# Patient Record
Sex: FEMALE | Race: WHITE | NOT HISPANIC OR LATINO | Employment: OTHER | URBAN - METROPOLITAN AREA
[De-identification: names, ages, dates, MRNs, and addresses within clinical notes are randomized per-mention and may not be internally consistent; named-entity substitution may affect disease eponyms.]

---

## 2017-05-18 ENCOUNTER — TELEPHONE (OUTPATIENT)
Dept: PEDIATRICS | Facility: CLINIC | Age: 72
End: 2017-05-18

## 2017-05-18 NOTE — LETTER
[facility]  [address] [place] [address] [phone]      May 18, 2017    Margaret Ramos                                                                                                                                                       [id number]  [employer] [id number]              Dear Margaret,      Your provider has reviewed your chart and it looks like you are due for a mammogram. If you could schedule this to your earliest convenience. This is available at different [place] locations and in our [facility] as well.If you have any questions feel free to call the clinic at [phone] . Thank you.                    Sincerely,  Enoc Price MD

## 2017-05-18 NOTE — TELEPHONE ENCOUNTER
Type of outreach:  Phone, left message for patient to call back.  and Sent letter.  Health Maintenance Due   Topic Date Due     TETANUS IMMUNIZATION (SYSTEM ASSIGNED)  03/01/1963     ADVANCE DIRECTIVE PLANNING Q5 YRS (NO INBASKET)  03/01/1963     PNEUMOCOCCAL (1 of 2 - PCV13) 03/01/2010     EYE EXAM Q1 YEAR( NO INBASKET)  07/23/2016     A1C Q6 MO( NO INBASKET)  05/29/2017     FALL RISK ASSESSMENT  06/07/2017     Elvia Willett MA

## 2017-05-18 NOTE — TELEPHONE ENCOUNTER
Please call patient to schedule a mammogram. Does not need visit with me for that, just schedule with mammogram.    Looks as though due to see Dr. Luciano for DM2 as well.     Enoc Price MD

## 2017-07-27 ENCOUNTER — TELEPHONE (OUTPATIENT)
Dept: PEDIATRICS | Facility: CLINIC | Age: 72
End: 2017-07-27

## 2017-07-27 DIAGNOSIS — R03.0 ELEVATED BLOOD PRESSURE READING WITHOUT DIAGNOSIS OF HYPERTENSION: ICD-10-CM

## 2017-07-27 DIAGNOSIS — E03.8 OTHER SPECIFIED HYPOTHYROIDISM: ICD-10-CM

## 2017-07-27 DIAGNOSIS — E78.2 MIXED HYPERLIPIDEMIA: ICD-10-CM

## 2017-07-27 NOTE — TELEPHONE ENCOUNTER
blood glucose monitoring (ACCU-CHEK JINA PLUS) test strip      Last Written Prescription Date: 5/19/2016  Last Fill Quantity: 400,  # refills: 3   Last Office Visit with [facility], [facility] or [facility] prescribing provider: 11/29/2016

## 2017-07-28 NOTE — TELEPHONE ENCOUNTER
Medication is being filled for 1 time refill only due to:  Patient needs to be seen because due for office visit/lab..     Routing to [facility] to facilitate office visit appointment.    Rachael David MSN, RN-BC  Care Coordinator

## 2017-07-28 NOTE — TELEPHONE ENCOUNTER
Spoke to pt. States she will callback in August. Informed her Dr. Luciano is booked out about a month. States she may want to see Dr. Price instead is who she has been seeing. Will be bringing outside orders from [facility] to have Lipids drawn from her Cardiologist that she see's.  Will reach out in 1 month if patient has not scheduled.    Thanks  Joey BLOOD  Team Coodinator

## 2017-08-18 ENCOUNTER — DOCUMENTATION ONLY (OUTPATIENT)
Dept: PEDIATRICS | Facility: CLINIC | Age: 72
End: 2017-08-18

## 2017-08-18 DIAGNOSIS — E21.3 HYPERPARATHYROIDISM (H): ICD-10-CM

## 2017-08-18 DIAGNOSIS — E78.2 MIXED HYPERLIPIDEMIA: ICD-10-CM

## 2017-08-18 DIAGNOSIS — E11.65 TYPE 2 DIABETES MELLITUS WITH HYPERGLYCEMIA, WITHOUT LONG-TERM CURRENT USE OF INSULIN (H): Primary | ICD-10-CM

## 2017-08-18 DIAGNOSIS — E03.8 OTHER SPECIFIED HYPOTHYROIDISM: ICD-10-CM

## 2017-08-18 NOTE — PROGRESS NOTES
has an upcoming lab only appointment 8/22. There are no open orders for this patient. Please place future orders and sign them.     Thanks

## 2017-08-22 DIAGNOSIS — E21.3 HYPERPARATHYROIDISM (H): ICD-10-CM

## 2017-08-22 DIAGNOSIS — E11.65 TYPE 2 DIABETES MELLITUS WITH HYPERGLYCEMIA, WITHOUT LONG-TERM CURRENT USE OF INSULIN (H): ICD-10-CM

## 2017-08-22 DIAGNOSIS — E78.2 MIXED HYPERLIPIDEMIA: ICD-10-CM

## 2017-08-22 LAB
ALBUMIN SERPL-MCNC: 4.2 G/DL (ref 3.4–5)
ALP SERPL-CCNC: 132 U/L (ref 40–150)
ALT SERPL W P-5'-P-CCNC: 40 U/L (ref 0–50)
ANION GAP SERPL CALCULATED.3IONS-SCNC: 5 MMOL/L (ref 3–14)
AST SERPL W P-5'-P-CCNC: 24 U/L (ref 0–45)
BILIRUB SERPL-MCNC: 0.5 MG/DL (ref 0.2–1.3)
BUN SERPL-MCNC: 14 MG/DL (ref 7–30)
CALCIUM SERPL-MCNC: 10.1 MG/DL (ref 8.5–10.1)
CHLORIDE SERPL-SCNC: 106 MMOL/L (ref 94–109)
CHOLEST SERPL-MCNC: 174 MG/DL
CO2 SERPL-SCNC: 28 MMOL/L (ref 20–32)
CREAT SERPL-MCNC: 0.73 MG/DL (ref 0.52–1.04)
GFR SERPL CREATININE-BSD FRML MDRD: 79 ML/MIN/1.7M2
GLUCOSE SERPL-MCNC: 186 MG/DL (ref 70–99)
HBA1C MFR BLD: 7.9 % (ref 4.3–6)
HDLC SERPL-MCNC: 64 MG/DL
LDLC SERPL CALC-MCNC: 88 MG/DL
NONHDLC SERPL-MCNC: 110 MG/DL
POTASSIUM SERPL-SCNC: 4.8 MMOL/L (ref 3.4–5.3)
PROT SERPL-MCNC: 7.3 G/DL (ref 6.8–8.8)
PTH-INTACT SERPL-MCNC: 100 PG/ML (ref 12–72)
SODIUM SERPL-SCNC: 139 MMOL/L (ref 133–144)
TRIGL SERPL-MCNC: 109 MG/DL

## 2017-08-22 PROCEDURE — 83036 HEMOGLOBIN GLYCOSYLATED A1C: CPT | Performed by: PEDIATRICS

## 2017-08-22 PROCEDURE — 80061 LIPID PANEL: CPT | Performed by: PEDIATRICS

## 2017-08-22 PROCEDURE — 83970 ASSAY OF PARATHORMONE: CPT | Performed by: PEDIATRICS

## 2017-08-22 PROCEDURE — 80053 COMPREHEN METABOLIC PANEL: CPT | Performed by: PEDIATRICS

## 2017-08-22 PROCEDURE — 36415 COLL VENOUS BLD VENIPUNCTURE: CPT | Performed by: PEDIATRICS

## 2017-09-03 DIAGNOSIS — E03.8 OTHER SPECIFIED HYPOTHYROIDISM: ICD-10-CM

## 2017-09-03 DIAGNOSIS — R03.0 ELEVATED BLOOD PRESSURE READING WITHOUT DIAGNOSIS OF HYPERTENSION: ICD-10-CM

## 2017-09-03 DIAGNOSIS — E78.2 MIXED HYPERLIPIDEMIA: ICD-10-CM

## 2017-09-04 NOTE — TELEPHONE ENCOUNTER
blood glucose monitoring (ACCU-CHEK JINA PLUS) test strip      Last Written Prescription Date: 8/14/2017  Last Fill Quantity: 100,  # refills: 0   Last Office Visit with [facility], [facility] or [facility] prescribing provider: 11/29/2016                                         Next 5 appointments (look out 90 days)     Sep 07, 2017  2:20 PM CDT   Office Visit with Jossie [facility] MD   [facility]    [address] [place] [address]  [address] [place] [id number] [phone]

## 2017-09-06 RX ORDER — BLOOD SUGAR DIAGNOSTIC
STRIP MISCELLANEOUS
Qty: 100 STRIP | Refills: 0 | Status: SHIPPED | OUTPATIENT
Start: 2017-09-06 | End: 2017-09-07

## 2017-09-06 NOTE — TELEPHONE ENCOUNTER
Medication is being filled for 1 time refill only due to:  Appt required-has appt scheduled for tmr   Jayne Abernathy RN

## 2017-09-07 ENCOUNTER — OFFICE VISIT (OUTPATIENT)
Dept: PEDIATRICS | Facility: CLINIC | Age: 72
End: 2017-09-07
Payer: COMMERCIAL

## 2017-09-07 VITALS
SYSTOLIC BLOOD PRESSURE: 122 MMHG | HEIGHT: 63 IN | OXYGEN SATURATION: 98 % | WEIGHT: 198 LBS | BODY MASS INDEX: 35.08 KG/M2 | HEART RATE: 77 BPM | TEMPERATURE: 98.2 F | DIASTOLIC BLOOD PRESSURE: 70 MMHG

## 2017-09-07 DIAGNOSIS — E03.8 OTHER SPECIFIED HYPOTHYROIDISM: ICD-10-CM

## 2017-09-07 DIAGNOSIS — E78.2 MIXED HYPERLIPIDEMIA: ICD-10-CM

## 2017-09-07 DIAGNOSIS — Z95.5 S/P DRUG ELUTING CORONARY STENT PLACEMENT: ICD-10-CM

## 2017-09-07 DIAGNOSIS — E11.9 TYPE 2 DIABETES MELLITUS WITHOUT COMPLICATION, WITHOUT LONG-TERM CURRENT USE OF INSULIN (H): Primary | ICD-10-CM

## 2017-09-07 DIAGNOSIS — R29.890 LOSS OF HEIGHT: ICD-10-CM

## 2017-09-07 DIAGNOSIS — I25.10 ASCVD (ARTERIOSCLEROTIC CARDIOVASCULAR DISEASE): ICD-10-CM

## 2017-09-07 DIAGNOSIS — E21.3 HYPERPARATHYROIDISM (H): ICD-10-CM

## 2017-09-07 DIAGNOSIS — Z91.89 AT RISK FOR OSTEOPENIA: ICD-10-CM

## 2017-09-07 DIAGNOSIS — E66.01 MORBID OBESITY, UNSPECIFIED OBESITY TYPE (H): ICD-10-CM

## 2017-09-07 PROCEDURE — 99215 OFFICE O/P EST HI 40 MIN: CPT | Performed by: PEDIATRICS

## 2017-09-07 RX ORDER — LANCETS
EACH MISCELLANEOUS
Qty: 400 EACH | Refills: 11 | Status: SHIPPED | OUTPATIENT
Start: 2017-09-07 | End: 2019-04-30

## 2017-09-07 RX ORDER — GLYBURIDE 2.5 MG/1
2.5 TABLET ORAL 2 TIMES DAILY WITH MEALS
Qty: 270 TABLET | Refills: 3 | Status: SHIPPED | OUTPATIENT
Start: 2017-09-07 | End: 2018-09-12

## 2017-09-07 RX ORDER — LEVOTHYROXINE SODIUM 112 UG/1
112 TABLET ORAL DAILY
Qty: 90 TABLET | Refills: 3 | Status: SHIPPED | OUTPATIENT
Start: 2017-09-07 | End: 2018-09-12

## 2017-09-07 NOTE — PROGRESS NOTES
SUBJECTIVE:   Margaret Ramos is a 72 year old female who presents to clinic today for the following health issues:      Diabetes Follow-up    Patient is checking blood sugars: three times daily.   Blood sugar testing frequency justification: Patient modifying lifestyle changes (diet, exercise) with blood sugars  Results are as follows:       am - 150 to 180       bedtime - 200        Diabetic concerns: None     Symptoms of hypoglycemia (low blood sugar): lethargy, Symptoms occur if sugars < 120.     Paresthesias (numbness or burning in feet) or sores: Yes     Date of last diabetic eye exam: 07/2015    Hyperlipidemia Follow-Up      Rate your low fat/cholesterol diet?: good    Taking statin?  Yes, no muscle aches from statin    Other lipid medications/supplements?:  none    Hypertension Follow-up      Outpatient blood pressures are being checked at home.  Results are 120-100/70-50.    Low Salt Diet: not monitoring salt      October 9th - has cardiology follow up for CAD - had MI last year.  No new cardiac symptoms or anginal equivalents.   Not currently exercising regularly - long discussion about increased activity today.  S/p drug eluting stent placement.  ON plavix.    Has heard of silver sneakers program - researched in the past, but hasn't pursued again.  Has a treadmill at home, but it is too cumbersome and requires too much space to use on a regular basis.      Hypothyroidism - no new symptoms related to low or high thyroid.    Has had high normal calcium levels for last lab draws and high parathyroid values - hasn't yet followed up with endocrinology.  Highly recommended that she do so.  Lost a partial inch of height at this visit, which is very troubling to her.    Willing to pursue a dexa scan given height loss.  Prefers not to do a mammogram in the same year as she feels that this is too much radiation.      Problem list and histories reviewed & adjusted, as indicated.  Additional history: as  "documented      Reviewed and updated as needed this visit by clinical staffTobacco  Allergies  Med Hx  Surg Hx  Fam Hx  Soc Hx      Reviewed and updated as needed this visit by Provider         ROS:  Constitutional, HEENT, cardiovascular, pulmonary, endo and neuro systems are negative, except as otherwise noted.      OBJECTIVE:   /70 (BP Location: Right arm, Patient Position: Right side, Cuff Size: Adult Regular)  Pulse 77  Temp 98.2  F (36.8  C) (Tympanic)  Ht 5' 2.75\" (1.594 m)  Wt 198 lb (89.8 kg)  SpO2 98%  BMI 35.35 kg/m2  Body mass index is 35.35 kg/(m^2).  GENERAL: alert, no distress and obese  NECK: no adenopathy, no asymmetry, masses, or scars and thyroid normal to palpation  RESP: lungs clear to auscultation - no rales, rhonchi or wheezes  CV: regular rate and rhythm, normal S1 S2, no S3 or S4, no murmur, click or rub, no peripheral edema and peripheral pulses strong  NEURO: Normal strength and tone, mentation intact and speech normal  PSYCH: mentation appears normal, affect normal/bright  Diabetic foot exam: normal DP and PT pulses, no trophic changes or ulcerative lesions and reduced sensation to arch of foot bilaterally    Diagnostic Test Results:  Results for orders placed or performed in visit on 08/22/17   Hemoglobin A1c   Result Value Ref Range    Hemoglobin A1C 7.9 (H) 4.3 - 6.0 %   Parathyroid Hormone Intact   Result Value Ref Range    Parathyroid Hormone Intact 100 (H) 12 - 72 pg/mL   Comprehensive metabolic panel   Result Value Ref Range    Sodium 139 133 - 144 mmol/L    Potassium 4.8 3.4 - 5.3 mmol/L    Chloride 106 94 - 109 mmol/L    Carbon Dioxide 28 20 - 32 mmol/L    Anion Gap 5 3 - 14 mmol/L    Glucose 186 (H) 70 - 99 mg/dL    Urea Nitrogen 14 7 - 30 mg/dL    Creatinine 0.73 0.52 - 1.04 mg/dL    GFR Estimate 79 >60 mL/min/1.7m2    GFR Estimate If Black >90 >60 mL/min/1.7m2    Calcium 10.1 8.5 - 10.1 mg/dL    Bilirubin Total 0.5 0.2 - 1.3 mg/dL    Albumin 4.2 3.4 - 5.0 g/dL "    Protein Total 7.3 6.8 - 8.8 g/dL    Alkaline Phosphatase 132 40 - 150 U/L    ALT 40 0 - 50 U/L    AST 24 0 - 45 U/L   Lipid panel reflex to direct LDL   Result Value Ref Range    Cholesterol 174 <200 mg/dL    Triglycerides 109 <150 mg/dL    HDL Cholesterol 64 >49 mg/dL    LDL Cholesterol Calculated 88 <100 mg/dL    Non HDL Cholesterol 110 <130 mg/dL       ASSESSMENT/PLAN:       ICD-10-CM    1. Type 2 diabetes mellitus without complication, without long-term current use of insulin (H) E11.9 metFORMIN (GLUCOPHAGE) 500 MG tablet     glyBURIDE (DIABETA /MICRONASE) 2.5 MG tablet     blood glucose monitoring (ACCU-CHEK JINA PLUS) test strip     blood glucose monitoring (SOFTCLIX) lancets     Vitamin B12     TSH     T4, free     T3 Free     Vitamin D Deficiency          Stable from last check, just barely maintaining control.  Long discussion today about activity level, considering exercise part of her diabetes treatment plan.  Follow up in 6 months.  Continue current medications for now.                  2. Other specified hypothyroidism E03.8 blood glucose monitoring (ACCU-CHEK JINA PLUS) test strip     levothyroxine (SYNTHROID/LEVOTHROID) 112 MCG tablet     blood glucose monitoring (SOFTCLIX) lancets     TSH     T4, free     T3 Free                  3. Mixed hyperlipidemia (DYSLIPIDEMIA) E78.2 blood glucose monitoring (ACCU-CHEK JINA PLUS) test strip     levothyroxine (SYNTHROID/LEVOTHROID) 112 MCG tablet     blood glucose monitoring (SOFTCLIX) lancets   4. Hyperparathyroidism (H) E21.3 DX Hip/Pelvis/Spine     ENDOCRINOLOGY ADULT REFERRAL     TSH     T4, free     T3 Free     Vitamin D Deficiency          Agreeable now to following up with endocrinology - new referral placed - patient to call for visit             5. At risk for osteopenia Z91.89 DX Hip/Pelvis/Spine  Prolonged hyperparathyroidism   6. Morbid obesity, unspecified obesity type (H) E66.01 Discussed recommended lifestyle changes.   7. Loss of height  R29.890 Due for dexa - see above   8. ASCVD (arteriosclerotic cardiovascular disease) I25.10 Follows with cardiology - upcoming appt - no new symptoms   9. S/P drug eluting coronary stent placement Z95.5 Remains on antiplatelet therapy       Patient Instructions   Call for a visit with Cha Santos    [facility] about [facility] for walking     Labs today on the first floor    Keep medications the same    If you can have your cardiologist send me a copy of your note, that would be wonderful      40 minutes spent with patient, more than 50% of the time was spent in counseling and coordination of care of the above issues.      Jossie Luciano MD  [facility]

## 2017-09-07 NOTE — PATIENT INSTRUCTIONS
Call for a visit with Cha Santos    Think about [facility] for walking     Labs today on the first floor    Keep medications the same    If you can have your cardiologist send me a copy of your note, that would be wonderful

## 2017-09-07 NOTE — MR AVS SNAPSHOT
After Visit Summary   9/7/2017    Margaret Ramos    MRN: [id number]           Patient Information     Date Of Birth          1945        Visit Information        Provider Department      9/7/2017 2:20 PM Jossie Luciano MD [facility]        Today's Diagnoses     Hyperparathyroidism (H)    -  1    Type 2 diabetes mellitus without complication, without long-term current use of insulin (H)        Other specified hypothyroidism        Mixed hyperlipidemia (DYSLIPIDEMIA)        At risk for osteopenia          Care Instructions    Call for a visit with Cha Santos    Think about [facility] for walking     Labs today on the first floor    Keep medications the same    If you can have your cardiologist send me a copy of your note, that would be wonderful          Follow-ups after your visit        Additional Services     ENDOCRINOLOGY ADULT REFERRAL       Your provider has referred you to: [facility]: [facility] [phone]   [email or web address]      Please be aware that coverage of these services is subject to the terms and limitations of your health insurance plan.  Call member services at your health plan with any benefit or coverage questions.      Please bring the following to your appointment:    >>   Any x-rays, CTs or MRIs which have been performed.  Contact the facility where they were done to arrange for  prior to your scheduled appointment.    >>   List of current medications   >>   This referral request   >>   Any documents/labs given to you for this referral                  Future tests that were ordered for you today     Open Future Orders        Priority Expected Expires Ordered    DX Hip/Pelvis/Spine Routine  9/7/2018 9/7/2017            Who to contact     If you have questions or need follow up information about today's clinic visit or your schedule please contact [facility] directly at  "[phone].  Normal or non-critical lab and imaging results will be communicated to you by MyChart, letter or phone within 4 business days after the clinic has received the results. If you do not hear from us within 7 days, please contact the clinic through [email or web address] or phone. If you have a critical or abnormal lab result, we will notify you by phone as soon as possible.  Submit refill requests through [employer] or call your pharmacy and they will forward the refill request to us. Please allow 3 business days for your refill to be completed.          Additional Information About Your Visit        [email or web address] Information     [employer] lets you send messages to your doctor, view your test results, renew your prescriptions, schedule appointments and more. To sign up, go to [email or web address] . Click on \"Log in\" on the left side of the screen, which will take you to the Welcome page. Then click on \"Sign up Now\" on the right side of the page.     You will be asked to enter the access code listed below, as well as some personal information. Please follow the directions to create your username and password.     Your access code is: [email or web address]  Expires: 2017  3:12 PM     Your access code will  in 90 days. If you need help or a new code, please call your [place] clinic or [phone].        Care EveryWhere ID     This is your Care EveryWhere ID. This could be used by other organizations to access your [place] medical records  [id number]        Your Vitals Were     Pulse Temperature Height Pulse Oximetry BMI (Body Mass Index)       77 98.2  F (36.8  C) (Tympanic) 5' 2.75\" (1.594 m) 98% 35.35 kg/m2        Blood Pressure from Last 3 Encounters:   17 122/70   16 128/70   16 130/68    Weight from Last 3 Encounters:   17 198 lb (89.8 kg)   16 197 lb (89.4 kg)   16 198 lb (89.8 kg)              We Performed the Following     ENDOCRINOLOGY ADULT REFERRAL     T3 Free     T4, free  "    TSH     Vitamin B12     Vitamin D Deficiency          Today's Medication Changes          These changes are accurate as of: 9/7/17  3:12 PM.  If you have any questions, ask your nurse or doctor.               These medicines have changed or have updated prescriptions.        Dose/Directions    blood glucose monitoring lancets   This may have changed:  additional instructions   Used for:  Other specified hypothyroidism, Type 2 diabetes mellitus without complication, without long-term current use of insulin (H), Mixed hyperlipidemia   Changed by:  Jossie Luciano MD        Use as directed upto 6 times daily   Quantity:  400 each   Refills:  11       blood glucose monitoring test strip   Commonly known as:  ACCU-CHEK JINA PLUS   This may have changed:    - additional instructions  - Another medication with the same name was removed. Continue taking this medication, and follow the directions you see here.   Used for:  Other specified hypothyroidism, Mixed hyperlipidemia, Type 2 diabetes mellitus without complication, without long-term current use of insulin (H)   Changed by:  Jossie Luciano MD        Use as directed up to 6 times daily   Quantity:  400 each   Refills:  3            Where to get your medicines      Some of these will need a paper prescription and others can be bought over the counter.  Ask your nurse if you have questions.     Bring a paper prescription for each of these medications     blood glucose monitoring lancets    blood glucose monitoring test strip    glyBURIDE 2.5 MG tablet    levothyroxine 112 MCG tablet    metFORMIN 500 MG tablet                Primary Care Provider Office Phone # Fax #    Jossie Luciano [phone] [address] [place] [address]        Equal Access to Services     [facility] [place] [facility]: Brett Salguero, rosa hussein, mookie dave. So [facility]  [phone].    ATENCIÓN: Si tae giraldo, tiene a delgado disposición servicios gratuitos de asistencia lingüística. Radha ashraf [phone].    We comply with applicable federal civil rights laws and [place] laws. We do not discriminate on the basis of race, color, national origin, age, disability sex, sexual orientation or gender identity.            Thank you!     Thank you for choosing [facility]  for your care. Our goal is always to provide you with excellent care. Hearing back from our patients is one way we can continue to improve our services. Please take a few minutes to complete the written survey that you may receive in the mail after your visit with us. Thank you!             Your Updated Medication List - Protect others around you: Learn how to safely use, store and throw away your medicines at www.disposemymeds.org.          This list is accurate as of: 9/7/17  3:12 PM.  Always use your most recent med list.                   Brand Name Dispense Instructions for use Diagnosis    ACE NOT PRESCRIBED (INTENTIONAL)      ACE Inhibitor not prescribed due to Refusal by patient        ACE/ARB NOT PRESCRIBED (INTENTIONAL)      ACE & ARB not prescribed due to Symptomatic hypotension not due to excessive diuresis    Type 2 diabetes mellitus without complication, without long-term current use of insulin (H)       albuterol (2.5 MG/3ML) 0.083% neb solution     360 mL    Take 1 vial (2.5 mg) by nebulization every 6 hours as needed for shortness of breath / dyspnea or wheezing    Mild asthma       ascorbic acid 500 MG tablet    VITAMIN C     Take 1 tablet by mouth        aspirin 81 MG EC tablet     90 tablet    Take 1 tablet (81 mg) by mouth daily    Diabetes mellitus, type 2 (H)       atorvastatin 20 MG tablet    LIPITOR    90 tablet    Take 4 tablets (80 mg) by mouth daily    Routine general medical examination at a health care facility, Type 2 diabetes mellitus without complication, without long-term  current use of insulin (H)       blood glucose monitoring lancets     400 each    Use as directed upto 6 times daily    Other specified hypothyroidism, Type 2 diabetes mellitus without complication, without long-term current use of insulin (H), Mixed hyperlipidemia       blood glucose monitoring test strip    ACCU-CHEK JINA PLUS    400 each    Use as directed up to 6 times daily    Other specified hypothyroidism, Mixed hyperlipidemia, Type 2 diabetes mellitus without complication, without long-term current use of insulin (H)       CALCIUM PO           CENTRUM SILVER per tablet      daily (every 24 hours).        CoQ-10 200 MG Caps      Take 200 mg by mouth daily        glyBURIDE 2.5 MG tablet    DIABETA /MICRONASE    270 tablet    Take 1 tablet (2.5 mg) by mouth 2 times daily (with meals)    Type 2 diabetes mellitus without complication, without long-term current use of insulin (H)       ketoconazole 2 % cream    NIZORAL    60 g    Apply topically 2 times daily    Type 2 diabetes mellitus with hyperglycemia (H)       levothyroxine 112 MCG tablet    SYNTHROID/LEVOTHROID    90 tablet    Take 1 tablet (112 mcg) by mouth daily Take 1/2 tab on Sundays    Other specified hypothyroidism, Mixed hyperlipidemia       magnesium sulfate 500 mg/mL Soln           metFORMIN 500 MG tablet    GLUCOPHAGE    360 tablet    Take 1 tablet (500 mg) by mouth 3 times daily (with meals) Take 1 and 1/2 tab qam, 1/2 tab q lunch and 1/2 tab q pm    Type 2 diabetes mellitus without complication, without long-term current use of insulin (H)       metoprolol 25 MG 24 hr tablet    TOPROL-XL     Take 0.5 tablets (12.5 mg) by mouth daily        PLAVIX 75 MG tablet   Generic drug:  clopidogrel      Take 1 tablet (75 mg) by mouth daily    Chronic ischemic heart disease       UNABLE TO FIND      MEDICATION NAME: Super Beet        VITAMIN D3 PO      Take 2,000 Units by mouth daily

## 2017-09-07 NOTE — NURSING NOTE
"Chief Complaint   Patient presents with     Diabetes     Recheck Medication       Initial /70 (BP Location: Right arm, Patient Position: Right side, Cuff Size: Adult Regular)  Pulse 77  Temp 98.2  F (36.8  C) (Tympanic)  Ht 5' 2.75\" (1.594 m)  Wt 198 lb (89.8 kg)  SpO2 98%  BMI 35.35 kg/m2 Estimated body mass index is 35.35 kg/(m^2) as calculated from the following:    Height as of this encounter: 5' 2.75\" (1.594 m).    Weight as of this encounter: 198 lb (89.8 kg).  Medication Reconciliation: complete  "

## 2017-09-12 DIAGNOSIS — E03.8 OTHER SPECIFIED HYPOTHYROIDISM: ICD-10-CM

## 2017-09-12 DIAGNOSIS — E21.3 HYPERPARATHYROIDISM (H): ICD-10-CM

## 2017-09-12 DIAGNOSIS — E11.9 TYPE 2 DIABETES MELLITUS WITHOUT COMPLICATION, WITHOUT LONG-TERM CURRENT USE OF INSULIN (H): ICD-10-CM

## 2017-09-12 LAB
T3FREE SERPL-MCNC: 2 PG/ML (ref 2.3–4.2)
VIT B12 SERPL-MCNC: 972 PG/ML (ref 193–986)

## 2017-09-12 PROCEDURE — 84481 FREE ASSAY (FT-3): CPT | Performed by: PEDIATRICS

## 2017-09-12 PROCEDURE — 36415 COLL VENOUS BLD VENIPUNCTURE: CPT | Performed by: PEDIATRICS

## 2017-09-12 PROCEDURE — 82607 VITAMIN B-12: CPT | Performed by: PEDIATRICS

## 2017-09-12 PROCEDURE — 84443 ASSAY THYROID STIM HORMONE: CPT | Performed by: PEDIATRICS

## 2017-09-12 PROCEDURE — 84439 ASSAY OF FREE THYROXINE: CPT | Performed by: PEDIATRICS

## 2017-09-12 PROCEDURE — 82306 VITAMIN D 25 HYDROXY: CPT | Performed by: PEDIATRICS

## 2017-09-13 LAB
DEPRECATED CALCIDIOL+CALCIFEROL SERPL-MC: 48 UG/L (ref 20–75)
T4 FREE SERPL-MCNC: 1.3 NG/DL (ref 0.76–1.46)
TSH SERPL DL<=0.005 MIU/L-ACNC: 0.86 MU/L (ref 0.4–4)

## 2017-09-18 ENCOUNTER — RADIANT APPOINTMENT (OUTPATIENT)
Dept: BONE DENSITY | Facility: CLINIC | Age: 72
End: 2017-09-18
Attending: PEDIATRICS
Payer: COMMERCIAL

## 2017-09-18 DIAGNOSIS — E21.3 HYPERPARATHYROIDISM (H): ICD-10-CM

## 2017-09-18 DIAGNOSIS — Z91.89 AT RISK FOR OSTEOPENIA: ICD-10-CM

## 2017-09-18 PROCEDURE — 77081 DXA BONE DENSITY APPENDICULR: CPT | Mod: 59 | Performed by: FAMILY MEDICINE

## 2017-09-18 PROCEDURE — 77085 DXA BONE DENSITY AXL VRT FX: CPT | Performed by: FAMILY MEDICINE

## 2017-09-21 ENCOUNTER — OFFICE VISIT (OUTPATIENT)
Dept: ENDOCRINOLOGY | Facility: CLINIC | Age: 72
End: 2017-09-21
Payer: COMMERCIAL

## 2017-09-21 VITALS
OXYGEN SATURATION: 97 % | HEART RATE: 78 BPM | DIASTOLIC BLOOD PRESSURE: 62 MMHG | TEMPERATURE: 98.4 F | SYSTOLIC BLOOD PRESSURE: 134 MMHG | HEIGHT: 63 IN | BODY MASS INDEX: 35.81 KG/M2 | WEIGHT: 202.1 LBS

## 2017-09-21 DIAGNOSIS — E11.65 TYPE 2 DIABETES MELLITUS WITH HYPERGLYCEMIA, WITHOUT LONG-TERM CURRENT USE OF INSULIN (H): Primary | ICD-10-CM

## 2017-09-21 DIAGNOSIS — E21.3 HYPERPARATHYROIDISM (H): ICD-10-CM

## 2017-09-21 DIAGNOSIS — E03.8 OTHER SPECIFIED HYPOTHYROIDISM: ICD-10-CM

## 2017-09-21 PROCEDURE — 99214 OFFICE O/P EST MOD 30 MIN: CPT | Performed by: INTERNAL MEDICINE

## 2017-09-21 NOTE — MR AVS SNAPSHOT
After Visit Summary   2017    Margaret Ramos    MRN: [id number]           Patient Information     Date Of Birth          1945        Visit Information        Provider Department      2017 11:30 AM Rosario Shannon MD [facility]        Today's Diagnoses     Type 2 diabetes mellitus with hyperglycemia, without long-term current use of insulin (H)    -  1    Other specified hypothyroidism        Hyperparathyroidism (H)          Care Instructions    [facility] & [place] locations   Dr Shannon, Endocrinology Department      [facility]   [address]  [place] [address]  Appointment [phone]  Fax: [phone]  [place]: Monday and Tuesday         [place] [address]  [place] [address]  Appointment [phone]  Fax: [phone]  [place]: Wednesday and Thursday          Please arrive 30 minutes before your scheduled appointment.   First go to the main floor lab suit  for previsit A1c lab appointment and then come upstairs and get checked in with  for clinic visit.  This will allow for your blood glucose meter/insulin pump to be downloaded and glycosylated hemoglobin (A1c) to be obtained prior to your appointment.    Continue current dose of medications for diabetes  Repeat labs in 3 months    Your provider has referred you to Diabetes Education: For all [facility]:  Phone [phone]; Fax [phone]  Please call and make the appointment.      You should get 1200 mg/day calcium in divided doses of no more than 500 mg/dose.  This INCLUDES what is in your food as well as what is in any supplements you may be taking.    Vit D about 1000 IU/day ( unless you have vit D deficiency- in that case higher dose)  Dietary sources of calcium:: These also contain vitamin D  Milk                            8 oz            300 mg  calcium  Yogurt                          1 cup           400 mg calcium   Hard cheese                     1.5 oz          300 mg  Cottage cheese                  2 cup           300 mg  Orange juice with Calcium       8 oz            300 mg  Low fat dairy sources are recommended      You should get 30 minutes of moderate weight bearing exercise on most days of the week .  Weight bearing exercise includes such things as walking, jogging, hiking, dancing.  You should also get Strength training 2 or more times/week in addition to other weight -being exercise. Strength training uses weight or resistance beyond that seen in everyday activities -(pilates, weight training with free weights, weight machines or resistance bands)                Follow-ups after your visit        Additional Services     DIABETES EDUCATOR REFERRAL       Your provider has referred you to Diabetes Education: For all [place] [facility]:  Phone [phone]; Fax [phone]    This is a Previous Diagnosis     Type of diabetes is Type 2 - On Oral Medication                                                          A1C is: Lab Results       Component                Value               Date                       A1C                      7.9                 08/22/2017            If an urgent visit is needed or A1C is above 12, Care Team to call the diabetes education team at [phone] or send a message to the diabetes education pool (P DIAB ED-PATIENT CARE).    Diabetes education focus: Knowledge Comprehensive knowledge assessment , Healthy Eating, Being Active, Monitoring blood sugar, Taking Medication, Problem Solving (hypoglycemia), Reducing Risks (preventing diabetes complications) and Healthy Coping and Medical Nutrition Therapy Previous diagnosis: Annual follow-up MNT - 2 hours      Education needs: None                                                                                                                                                  "     Please be aware that coverage of these services is subject to the terms and limitations of your health insurance plan.  Call member services at your health plan to determine Diabetes Self-Management Training benefits and ask which blood glucose monitor brands are covered by your plan.      Please bring the following to your appointment:    -   List of current medications   -   List of blood glucose monitor brands that are covered by your insurance plan  -   Blood glucose monitor and log book  -   Food records for the 3 days prior to your visit      The Certified Diabetes Educator may make diabetes medication adjustments per  the CDE Protocol and Collaborative Practice Agreement.                  Who to contact     If you have questions or need follow up information about today's clinic visit or your schedule please contact [facility] directly at [phone].  Normal or non-critical lab and imaging results will be communicated to you by [employer], letter or phone within 4 business days after the clinic has received the results. If you do not hear from us within 7 days, please contact the clinic through [employer] or phone. If you have a critical or abnormal lab result, we will notify you by phone as soon as possible.  Submit refill requests through [employer] or call your pharmacy and they will forward the refill request to us. Please allow 3 business days for your refill to be completed.          Additional Information About Your Visit        [employer] Information     [employer] lets you send messages to your doctor, view your test results, renew your prescriptions, schedule appointments and more. To sign up, go to [email or web address] . Click on \"Log in\" on the left side of the screen, which will take you to the Welcome page. Then click on \"Sign up Now\" on the right side of the page.     You will be asked to enter the access code listed below, as well as some personal information. Please follow the " "directions to create your username and password.     Your access code is: [email or web address]  Expires: 2017  3:12 PM     Your access code will  in 90 days. If you need help or a new code, please call your [place] clinic or [phone].        Care EveryWhere ID     This is your Care EveryWhere ID. This could be used by other organizations to access your [place] medical records  [id number]        Your Vitals Were     Pulse Temperature Height Pulse Oximetry BMI (Body Mass Index)       78 98.4  F (36.9  C) (Oral) 1.594 m (5' 2.75\") 97% 36.09 kg/m2        Blood Pressure from Last 3 Encounters:   17 134/62   17 122/70   16 128/70    Weight from Last 3 Encounters:   17 91.7 kg (202 lb 1.6 oz)   17 89.8 kg (198 lb)   16 89.4 kg (197 lb)              We Performed the Following     DIABETES EDUCATOR REFERRAL          Today's Medication Changes          These changes are accurate as of: 17 12:17 PM.  If you have any questions, ask your nurse or doctor.               These medicines have changed or have updated prescriptions.        Dose/Directions    atorvastatin 20 MG tablet   Commonly known as:  LIPITOR   This may have changed:  how much to take   Used for:  Routine general medical examination at a health care facility, Type 2 diabetes mellitus without complication, without long-term current use of insulin (H)        Dose:  80 mg   Take 4 tablets (80 mg) by mouth daily   Quantity:  90 tablet   Refills:  1                Primary Care Provider Office Phone # Fax #    Jossie Luciano [phone] [address] [place] [address]        Equal Access to Services     [facility] AH: Hadii eulalia Salguero, waaxda luqadaha, qaybta kaalmada ademoniqueyada, mookie carpenter. So [facility] [phone].    ATENCIÓN: Si habla español, tiene a delgado disposición servicios gratuitos de asistencia lingüística. Llame al " [phone].    We comply with applicable federal civil rights laws and [place] laws. We do not discriminate on the basis of race, color, national origin, age, disability sex, sexual orientation or gender identity.            Thank you!     Thank you for choosing [facility]  for your care. Our goal is always to provide you with excellent care. Hearing back from our patients is one way we can continue to improve our services. Please take a few minutes to complete the written survey that you may receive in the mail after your visit with us. Thank you!             Your Updated Medication List - Protect others around you: Learn how to safely use, store and throw away your medicines at www.disposemymeds.org.          This list is accurate as of: 9/21/17 12:17 PM.  Always use your most recent med list.                   Brand Name Dispense Instructions for use Diagnosis    ACE NOT PRESCRIBED (INTENTIONAL)      ACE Inhibitor not prescribed due to Refusal by patient        ACE/ARB NOT PRESCRIBED (INTENTIONAL)      ACE & ARB not prescribed due to Symptomatic hypotension not due to excessive diuresis    Type 2 diabetes mellitus without complication, without long-term current use of insulin (H)       albuterol (2.5 MG/3ML) 0.083% neb solution     360 mL    Take 1 vial (2.5 mg) by nebulization every 6 hours as needed for shortness of breath / dyspnea or wheezing    Mild asthma       ascorbic acid 500 MG tablet    VITAMIN C     Take 1 tablet by mouth        aspirin 81 MG EC tablet     90 tablet    Take 1 tablet (81 mg) by mouth daily    Diabetes mellitus, type 2 (H)       atorvastatin 20 MG tablet    LIPITOR    90 tablet    Take 4 tablets (80 mg) by mouth daily    Routine general medical examination at a health care facility, Type 2 diabetes mellitus without complication, without long-term current use of insulin (H)       blood glucose monitoring lancets     400 each    Use as directed upto 6 times daily     Other specified hypothyroidism, Type 2 diabetes mellitus without complication, without long-term current use of insulin (H), Mixed hyperlipidemia       blood glucose monitoring test strip    ACCU-CHEK JINA PLUS    400 each    Use as directed up to 6 times daily    Other specified hypothyroidism, Mixed hyperlipidemia, Type 2 diabetes mellitus without complication, without long-term current use of insulin (H)       CALCIUM PO           CENTRUM SILVER per tablet      daily (every 24 hours).        CoQ-10 200 MG Caps      Take 200 mg by mouth daily        glyBURIDE 2.5 MG tablet    DIABETA /MICRONASE    270 tablet    Take 1 tablet (2.5 mg) by mouth 2 times daily (with meals)    Type 2 diabetes mellitus without complication, without long-term current use of insulin (H)       ketoconazole 2 % cream    NIZORAL    60 g    Apply topically 2 times daily    Type 2 diabetes mellitus with hyperglycemia (H)       levothyroxine 112 MCG tablet    SYNTHROID/LEVOTHROID    90 tablet    Take 1 tablet (112 mcg) by mouth daily Take 1/2 tab on Sundays    Other specified hypothyroidism, Mixed hyperlipidemia       magnesium sulfate 500 mg/mL Soln           metFORMIN 500 MG tablet    GLUCOPHAGE    360 tablet    Take 1 tablet (500 mg) by mouth 3 times daily (with meals) Take 1 and 1/2 tab qam, 1/2 tab q lunch and 1/2 tab q pm    Type 2 diabetes mellitus without complication, without long-term current use of insulin (H)       metoprolol 25 MG 24 hr tablet    TOPROL-XL     Take 0.5 tablets (12.5 mg) by mouth daily        PLAVIX 75 MG tablet   Generic drug:  clopidogrel      Take 1 tablet (75 mg) by mouth daily    Chronic ischemic heart disease       UNABLE TO FIND      MEDICATION NAME: Super Beet        VITAMIN D3 PO      Take 2,000 Units by mouth daily

## 2017-09-21 NOTE — NURSING NOTE
"Chief Complaint   Patient presents with     Referral     Hyperparathyroidism Dr Luciano        Initial /62 (BP Location: Left arm, Patient Position: Chair, Cuff Size: Adult Large)  Pulse 78  Temp 98.4  F (36.9  C) (Oral)  Ht 1.594 m (5' 2.75\")  Wt 91.7 kg (202 lb 1.6 oz)  SpO2 97%  BMI 36.09 kg/m2 Estimated body mass index is 36.09 kg/(m^2) as calculated from the following:    Height as of this encounter: 1.594 m (5' 2.75\").    Weight as of this encounter: 91.7 kg (202 lb 1.6 oz).  Medication Reconciliation: complete     ENDOCRINOLOGY INTAKE FORM    Patient Name:  Margaret Ramos  :  1945    Is patient Diabetic?   Yes: type 2   Does patient have non-diabetic or other endocrine issues?  Yes: hyperparathyrioidism    Vitals: /62 (BP Location: Left arm, Patient Position: Chair, Cuff Size: Adult Large)  Pulse 78  Temp 98.4  F (36.9  C) (Oral)  Ht 1.594 m (5' 2.75\")  Wt 91.7 kg (202 lb 1.6 oz)  SpO2 97%  BMI 36.09 kg/m2  BMI= Body mass index is 36.09 kg/(m^2).    Flu vaccine:  No  Pneumonia vaccine:  No    Smoking and Alcohol use:  Social History   Substance Use Topics     Smoking status: Never Smoker     Smokeless tobacco: Never Used     Alcohol use No       Foot Exam: Yes: 17  Eye Exam:  No  Dental Exam:  completed  Aspirin Use:  Yes: completed    Lab Results   Component Value Date    A1C 7.9 2017    A1C 7.9 2016    A1C 8.0 2016    A1C 8.1 2015    A1C 8.0 2015       Lab Results   Component Value Date    MICROL <5 2016     No results found for: MICROALBUMIN        Staff Signature: Marcela Glaser CMA (AAMA)          "

## 2017-09-21 NOTE — PATIENT INSTRUCTIONS
[facility] & [place] locations   Dr Shannon, Endocrinology Department      [facility] [place]   [address]  [place] [address]  Appointment [phone]  Fax: [phone]  [place]: Monday and Tuesday         [facility]   [address]  [place] [address]  Appointment [phone]  Fax: [phone]  [place]: Wednesday and Thursday          Please arrive 30 minutes before your scheduled appointment.   First go to the main floor lab suit  for previsit A1c lab appointment and then come upstairs and get checked in with  for clinic visit.  This will allow for your blood glucose meter/insulin pump to be downloaded and glycosylated hemoglobin (A1c) to be obtained prior to your appointment.    Continue current dose of medications for diabetes  Repeat labs in 3 months    Your provider has referred you to Diabetes Education: For all [facility]:  Phone [phone]; Fax [phone]  Please call and make the appointment.      You should get 1200 mg/day calcium in divided doses of no more than 500 mg/dose.  This INCLUDES what is in your food as well as what is in any supplements you may be taking.    Vit D about 1000 IU/day ( unless you have vit D deficiency- in that case higher dose)  Dietary sources of calcium:: These also contain vitamin D  Milk                            8 oz            300 mg calcium  Yogurt                          1 cup           400 mg calcium   Hard cheese                     1.5 oz          300 mg  Cottage cheese                  2 cup           300 mg  Orange juice with Calcium       8 oz            300 mg  Low fat dairy sources are recommended      You should get 30 minutes of moderate weight bearing exercise on most days of the week .  Weight bearing exercise includes such things as walking, jogging, hiking, dancing.  You should also get Strength training 2 or more  times/week in addition to other weight -being exercise. Strength training uses weight or resistance beyond that seen in everyday activities -(pilates, weight training with free weights, weight machines or resistance bands)

## 2017-09-21 NOTE — PROGRESS NOTES
Name: Margaret Ramos  Seen for f/u. [facility]   HPI:  Margaret Ramos is a 72 year old female who presents for the evaluation/management of:    1. Type 2 DM:  Orginally diagnosed at the age of: 54 in 11/99. Initially on glucovance, then switched to metformin and glyburide due to formulary issues.  Current Regimen: Metformin 750 mg qam, 250 mg qpm, metformin  qpm, metformin 250 mg qpm and glyburide 2.5 mg bid  BS checks: bid  Average Meter Download: NA but reports that blood sugar is mostly ranging from 120-180 range. No major episodes of hypoglycemia noted/reported.   Here for f/u. Accuchecks higher, due to dietary indiscretions. Has been doing yard work in the fall. C/o incontinence and balance issues. Lives alone.       Complications:   Diabetes Complications  Description / Detail    Diabetic Retinopathy  No   CAD / PAD  Yes. S/p stent placement   Neuropathy  No   Nephropathy / Microalbuminuria  No   Gastroparesis  No   Hypoglycemia [place]  No     2. Hypertension: Blood Pressure today:   BP Readings from Last 3 Encounters:   09/21/17 134/62   09/07/17 122/70   11/29/16 128/70      Blood pressure medications include none. Denies feeling lightheaded or dizzy.    3. Hyperlipidemia: Takes lipitor for lipid control.  Denies muscle aches of pains.       PMH/PSH:  Past Medical History:   Diagnosis Date     ASCVD (arteriosclerotic cardiovascular disease) 8/10/2016     Chronic ischemic heart disease 6/13/2016     Hypothyroidism 10/20/2014     Inferior MI (H) 8/10/2016    2016      Mixed hyperlipidemia (DYSLIPIDEMIA) 10/20/2014     Morbid obesity (H) 10/13/2015     S/P drug eluting coronary stent placement 8/10/2016    RCA 6/2016      Type 2 diabetes mellitus with hyperglycemia (H) 10/20/2014       Past Surgical History:   Procedure Laterality Date     APPENDECTOMY      age 7     BACK SURGERY  1978     CARPAL TUNNEL RELEASE RT/LT      bilateral     wisdom teeth extraction       Family Hx:  Family History   Problem  "Relation Age of Onset     DIABETES Mother      type 2     DIABETES Sister      both sisters have type 2     Coronary Artery Disease Sister      Thyroid disease: No         DM2: Yes - sisters,mother         Autoimmune: DM1, SLE, RA, Vitiligo No    Social Hx:  Social History     Social History     Marital status: Single     Spouse name: N/A     Number of children: 0     Years of education: N/A     Occupational History     Not on file.     Social History Main Topics     Smoking status: Never Smoker     Smokeless tobacco: Never Used     Alcohol use No     Drug use: No     Sexual activity: Not Currently     Other Topics Concern     Parent/Sibling W/ Cabg, Mi Or Angioplasty Before 65f 55m? No     Social History Narrative          MEDICATIONS:  has a current medication list which includes the following prescription(s): UNABLE TO FIND, metformin, glyburide, blood glucose monitoring, levothyroxine, blood glucose monitoring, atorvastatin, magnesium sulfate, albuterol, ACE/ARB NOT PRESCRIBED, INTENTIONAL,, ascorbic acid, centrum silver, metoprolol, clopidogrel, ketoconazole, ACE NOT PRESCRIBED, INTENTIONAL,, aspirin, cholecalciferol, coq-10, and calcium.    ROS     ROS: 10 point ROS neg other than the symptoms noted above in the HPI.    Physical Exam   VS: /62 (BP Location: Left arm, Patient Position: Chair, Cuff Size: Adult Large)  Pulse 78  Temp 98.4  F (36.9  C) (Oral)  Ht 1.594 m (5' 2.75\")  Wt 91.7 kg (202 lb 1.6 oz)  SpO2 97%  BMI 36.09 kg/m2  GENERAL: AXOX3, NAD, well dressed, answering questions appropriately, appears stated age.  HEENT: No exopthalmous, no proptosis, EOMI, no lig lag, no retraction  NECK: no thyromegaly  CV: RRR, no rubs, gallops, no murmurs  LUNGS: CTAB, no wheezes, rales, or ronchi  ABDOMEN: +BS  EXTREMITIES: no edema, +pulses, no rashes, no lesions  NEUROLOGY: CN grossly intact, + DTR upper and lower extremity, no tremors  MSK: grossly intact  SKIN: no rashes, no lesions    LABS:  Lab " Results   Component Value Date    A1C 7.9 08/22/2017    A1C 7.9 11/29/2016    A1C 8.0 06/02/2016    A1C 8.1 12/03/2015    A1C 8.0 09/01/2015     Lab Results   Component Value Date    UMALCR Unable to calculate due to low value 11/29/2016      ENDO CALCIUM LABS-UMP Latest Ref Rng & Units 8/22/2017 11/29/2016   CALCIUM 8.5 - 10.1 mg/dL 10.1 10.0     ENDO THYROID LABS-UMP Latest Ref Rng & Units 9/12/2017 11/29/2016   TSH 0.40 - 4.00 mU/L 0.86 0.76   T4 FREE 0.76 - 1.46 ng/dL 1.30 1.21   FREE T3 2.3 - 4.2 pg/mL 2.0 (L)      ENDO CALCIUM LABS-UMP Latest Ref Rng & Units 8/22/2017 11/29/2016   PARATHYROID HORMONE INTACT 12 - 72 pg/mL 100 (H) 100 (H)     Vitamin D Deficiency Screening Results:  Lab Results   Component Value Date    VITDT 48 09/12/2017    VITDT 41 11/29/2016    VITDT 36 12/08/2015    VITDT 48 10/20/2014       All pertinent notes, labs, and images personally reviewed by me.     A/P  Ms.Margaret Ramos is a 69 year old here for the evaluation/management of diabetes:    1. DM2 -under fair control. A1c 7.9%.  Dietary indiscretion playing a role in worsening control.  History of CAD status post stent placement.  Goal A1c less than 8.0%.  In general per her report blood sugars are ranging from 120-180.  No major episodes of hypoglycemia noted.  She is resistant to make any changes today.  We will continue current regimen.    Continue metformin 750 bid ( takes combination of regular and ER metformin) pt is reluctant to take higher doses due to concerns about intolerance).  Continue glyburide 2.5 mg bid.  Recommend checking blood sugars before meals and 2 hours after meals  Discussed that I would prefer that she d/c glyburide and switch to a dpp4 ( januvia) to avoid the risk of hypoglycemia.Pt is reluctant to do this or consider any other options at this time.  CDE referral.    Recommend checking blood sugars before meals and at bedtime.    If Blood glucose are low more often-> 2-3 times/week- give us a call.  The  patient is advised to Make better food choices: reduce carbs, Reduce portion size, weight loss and exercise 3-4 times a week.  Discussed hypoglycemia signs and symptoms as well as management in detail.     2. Hypertension -under good control.  Previously ACE discussed, pt is reluctant to start.     3. Hyperlipidemia - Under Fair control.  Taking lipitor 10 mg qd, recommend dose increase.Pt is reluctant stating that higher doses cause constipation. States she is sensitive to chemicals.    4. Prevention  Flu Shot- declines  Pneumovax- 12/27/10  Opthalmology-Yes  Dental-Yes  ASA-yes  Smoking- No    5. Hypothyroidism    On levoT 112 mcg qd  with 1/2 tab on Sundays.  Continue current dose of levothyroxine.  Recent labs in normal range except slightly low free T3.  TSH and free T4 are in normal range.  Clinically looks euthyroid.    6. Osteopenia  Dexa in 2013 shows osteopenia  -- cont calcium/D, ca 10.2--->10.1,     7.  Hyperparathyroidism :  Mildly elevated PTH at 100 c/w possible early primary hyperparathyroidism, check today.  Calcium normal.  Vitamin D normal.  Kidney function normal  DEXA showing osteopenia   Repeat 2017 DEXA was done and results are pending  Plan- continue to monitor at this time.  Repeat labs in 3-6 months  Consider parathyroid scan based on that.      Labs ordered today:   Orders Placed This Encounter   Procedures     Hemoglobin A1c     Phosphorus     Magnesium     Vitamin D Deficiency     TSH with free T4 reflex     DIABETES EDUCATOR REFERRAL       Radiology/Consults ordered today: DIABETES EDUCATOR REFERRAL    Meds ordered today:   No orders of the defined types were placed in this encounter.      There are no discontinued medications.  All questions were answered.  The patient indicates understanding of the above issues and agrees with the plan set forth.       More than 50% of face to face time spent with Ms. Ramos on counseling / coordinating her care.  Total face to face time was 40 minutes.       Follow-up:  follow up in 3 months    Rosraio Buenrostro  CC: Jossie Luciano MD

## 2017-09-27 ENCOUNTER — TELEPHONE (OUTPATIENT)
Dept: BONE DENSITY | Facility: CLINIC | Age: 72
End: 2017-09-27

## 2017-09-27 NOTE — TELEPHONE ENCOUNTER
Actual DXA scan and report mailed to patient per her request at dxa appt.  LOVELY abstracted.    Kimberly BURR (R)(M)  [facility]  [phone]

## 2017-09-29 DIAGNOSIS — R03.0 ELEVATED BLOOD PRESSURE READING WITHOUT DIAGNOSIS OF HYPERTENSION: ICD-10-CM

## 2017-09-29 DIAGNOSIS — E03.8 OTHER SPECIFIED HYPOTHYROIDISM: ICD-10-CM

## 2017-09-29 DIAGNOSIS — E78.2 MIXED HYPERLIPIDEMIA: ICD-10-CM

## 2017-09-29 RX ORDER — BLOOD SUGAR DIAGNOSTIC
STRIP MISCELLANEOUS
Qty: 100 STRIP | Refills: 0 | OUTPATIENT
Start: 2017-09-29

## 2017-09-29 NOTE — TELEPHONE ENCOUNTER
Patient called back to let us know that she does NOT need these. Call her if you have any questions, pt is going to call pharmacy to get them to stop sending these to us.

## 2017-09-29 NOTE — TELEPHONE ENCOUNTER
Duplicate.     blood glucose monitoring (ACCU-CHEK JINA PLUS) test strip was filled on 9/7/2017, qty 400 with 3 refills.

## 2017-10-11 NOTE — TELEPHONE ENCOUNTER
Advised regular use of [employer] grid. Several prescriptions were printed out at her last visit.   Called and left message for her to call back and clarify if this is needed.  Anjali Bowman, RN  Triage Nurse

## 2017-10-16 ENCOUNTER — ALLIED HEALTH/NURSE VISIT (OUTPATIENT)
Dept: EDUCATION SERVICES | Facility: CLINIC | Age: 72
End: 2017-10-16
Payer: COMMERCIAL

## 2017-10-16 DIAGNOSIS — E11.65 TYPE 2 DIABETES MELLITUS WITH HYPERGLYCEMIA, WITHOUT LONG-TERM CURRENT USE OF INSULIN (H): Primary | ICD-10-CM

## 2017-10-16 PROCEDURE — G0108 DIAB MANAGE TRN  PER INDIV: HCPCS

## 2017-10-16 NOTE — MR AVS SNAPSHOT
After Visit Summary   10/16/2017    Margaret Ramos    MRN: [id number]           Patient Information     Date Of Birth          1945        Visit Information        Provider Department      10/16/2017 12:30 PM EA DIABETIC ED RESOURCE [facility]        Today's Diagnoses     Type 2 diabetes mellitus with hyperglycemia, without long-term current use of insulin (H)    -  1       Follow-ups after your visit        Your next 10 appointments already scheduled     Dec 28, 2017 11:30 AM CST   LAB with EA LAB   [facility] ([facility])    [address]  [address]  [facility] [id number] [phone]           Patient must bring picture ID. Patient should be prepared to give a urine specimen  Please do not eat 10-12 hours before your appointment if you are coming in fasting for labs on lipids, cholesterol, or glucose (sugar). Pregnant women should follow their Care Team instructions. Water with medications is okay. Do not drink coffee or other fluids. If you have concerns about taking  your medications, please ask at office or if scheduling via [employer], send a message by clicking on Secure Messaging, Message Your Care Team.            Jan 04, 2018  1:30 PM CST   New Visit with Rosario Shannon MD   [facility] ([facility])    [address]  [facility] [address] [phone]              Who to contact     If you have questions or need follow up information about today's clinic visit or your schedule please contact [facility] directly at [phone].  Normal or non-critical lab and imaging results will be communicated to you by MyChart, letter or phone within 4 business days after the clinic has received the results. If you do not hear from us within 7 days, please contact the clinic through MyChart or phone. If you have a critical or abnormal lab result, we will notify you by phone as soon as  "possible.  Submit refill requests through [employer] or call your pharmacy and they will forward the refill request to us. Please allow 3 business days for your refill to be completed.          Additional Information About Your Visit        [employer] Information     [employer] lets you send messages to your doctor, view your test results, renew your prescriptions, schedule appointments and more. To sign up, go to [email or web address] . Click on \"Log in\" on the left side of the screen, which will take you to the Welcome page. Then click on \"Sign up Now\" on the right side of the page.     You will be asked to enter the access code listed below, as well as some personal information. Please follow the directions to create your username and password.     Your access code is: [email or web address]  Expires: 2017  3:12 PM     Your access code will  in 90 days. If you need help or a new code, please call your [facility] clinic or [phone].        Care EveryWhere ID     This is your Care EveryWhere ID. This could be used by other organizations to access your [facility] medical records  [id number]         Blood Pressure from Last 3 Encounters:   17 134/62   17 122/70   16 128/70    Weight from Last 3 Encounters:   17 91.7 kg (202 lb 1.6 oz)   17 89.8 kg (198 lb)   16 89.4 kg (197 lb)              We Performed the Following     DIABETES EDUCATION - Individual  []     DIABETES EDUCATION - Individual  []          Today's Medication Changes          These changes are accurate as of: 10/16/17 11:59 PM.  If you have any questions, ask your nurse or doctor.               These medicines have changed or have updated prescriptions.        Dose/Directions    atorvastatin 20 MG tablet   Commonly known as:  LIPITOR   This may have changed:  how much to take   Used for:  Routine general medical examination at a health care facility, Type 2 diabetes mellitus without complication, without " long-term current use of insulin (H)        Dose:  80 mg   Take 4 tablets (80 mg) by mouth daily   Quantity:  90 tablet   Refills:  1                Primary Care Provider Office Phone # Fax #    Jossie Luciano [phone] [address] [place] [address]        Equal Access to Services     [facility]: Hadii aad ku hadasho Soomaali, waaxda luqadaha, qaybta kaalmada adeegyada, mookie prietoin haypitern ademonique horegina palafox . So [facility] [phone].    ATENCIÓN: Si habla español, tiene a delgado disposición servicios gratuitos de asistencia lingüística. Llame al [phone].    We comply with applicable federal civil rights laws and [place] laws. We do not discriminate on the basis of race, color, national origin, age, disability, sex, sexual orientation, or gender identity.            Thank you!     Thank you for choosing [facility]  for your care. Our goal is always to provide you with excellent care. Hearing back from our patients is one way we can continue to improve our services. Please take a few minutes to complete the written survey that you may receive in the mail after your visit with us. Thank you!             Your Updated Medication List - Protect others around you: Learn how to safely use, store and throw away your medicines at www.disposemymeds.org.          This list is accurate as of: 10/16/17 11:59 PM.  Always use your most recent med list.                   Brand Name Dispense Instructions for use Diagnosis    ACE NOT PRESCRIBED (INTENTIONAL)      ACE Inhibitor not prescribed due to Refusal by patient        ACE/ARB NOT PRESCRIBED (INTENTIONAL)      ACE & ARB not prescribed due to Symptomatic hypotension not due to excessive diuresis    Type 2 diabetes mellitus without complication, without long-term current use of insulin (H)       albuterol (2.5 MG/3ML) 0.083% neb solution     360 mL    Take 1 vial (2.5 mg) by nebulization every 6 hours as needed for  shortness of breath / dyspnea or wheezing    Mild asthma       ascorbic acid 500 MG tablet    VITAMIN C     Take 1 tablet by mouth        aspirin 81 MG EC tablet     90 tablet    Take 1 tablet (81 mg) by mouth daily    Diabetes mellitus, type 2 (H)       atorvastatin 20 MG tablet    LIPITOR    90 tablet    Take 4 tablets (80 mg) by mouth daily    Routine general medical examination at a health care facility, Type 2 diabetes mellitus without complication, without long-term current use of insulin (H)       blood glucose monitoring lancets     400 each    Use as directed upto 6 times daily    Other specified hypothyroidism, Type 2 diabetes mellitus without complication, without long-term current use of insulin (H), Mixed hyperlipidemia       blood glucose monitoring test strip    ACCU-CHEK JINA PLUS    400 each    Use as directed up to 6 times daily    Other specified hypothyroidism, Mixed hyperlipidemia, Type 2 diabetes mellitus without complication, without long-term current use of insulin (H)       CALCIUM PO           CENTRUM SILVER per tablet      daily (every 24 hours).        CoQ-10 200 MG Caps      Take 200 mg by mouth daily        glyBURIDE 2.5 MG tablet    DIABETA /MICRONASE    270 tablet    Take 1 tablet (2.5 mg) by mouth 2 times daily (with meals)    Type 2 diabetes mellitus without complication, without long-term current use of insulin (H)       ketoconazole 2 % cream    NIZORAL    60 g    Apply topically 2 times daily    Type 2 diabetes mellitus with hyperglycemia (H)       levothyroxine 112 MCG tablet    SYNTHROID/LEVOTHROID    90 tablet    Take 1 tablet (112 mcg) by mouth daily Take 1/2 tab on Sundays    Other specified hypothyroidism, Mixed hyperlipidemia       magnesium sulfate 500 mg/mL Soln           metFORMIN 500 MG tablet    GLUCOPHAGE    360 tablet    Take 1 tablet (500 mg) by mouth 3 times daily (with meals) Take 1 and 1/2 tab qam, 1/2 tab q lunch and 1/2 tab q pm    Type 2 diabetes mellitus  without complication, without long-term current use of insulin (H)       metoprolol 25 MG 24 hr tablet    TOPROL-XL     Take 0.5 tablets (12.5 mg) by mouth daily        PLAVIX 75 MG tablet   Generic drug:  clopidogrel      Take 1 tablet (75 mg) by mouth daily    Chronic ischemic heart disease       UNABLE TO FIND      MEDICATION NAME: Super Beet        VITAMIN D3 PO      Take 2,000 Units by mouth daily

## 2017-10-16 NOTE — PROGRESS NOTES
Diabetes Self Management Training: Individual Review Visit    Margaret Ramos presents today for education related to Type 2 diabetes.    She is accompanied by self    Patient's diabetes management related comments/concerns: pt concerned about recent dx of osteoporosis and parathyroid issues    Patient's emotional response to diabetes: expresses readiness to learn and concern for health and well-being    Patient would like this visit to be focused around the following diabetes-related behaviors and goals: Healthy Eating    ASSESSMENT:  Patient Problem List and Family Medical History reviewed for relevant medical history, current medical status, and diabetes risk factors.    Current Diabetes Management per Patient:       Diabetes Medication(s)     Biguanides Sig    metFORMIN (GLUCOPHAGE) 500 MG tablet Take 1 tablet (500 mg) by mouth 3 times daily (with meals) Take 1 and 1/2 tab qam, 1/2 tab q lunch and 1/2 tab q pm    Sulfonylureas Sig    glyBURIDE (DIABETA /MICRONASE) 2.5 MG tablet Take 1 tablet (2.5 mg) by mouth 2 times daily (with meals)        Problems taking diabetes medications regularly? Yes , Side effects? No     Past Diabetes Education: Yes    BG results:          BG values are: Not in goal  Patient's most recent A1c:  Lab Results   Component Value Date    A1C 7.9 08/22/2017     Nutrition:  Patient kept the following food record. Eats a large volume of food as a late breakfast, grazes during afternoon, and then has light dinner.   Is not counting carbs. Noted breakfast contains >75 gms CHO. Takes several supplements including Calcium, Vit. D, Beet, Cinnamon, Cranberry, Eye.           Beverages: coffee, milk, OJ    Cultural/[place]ai diet restrictions: No     Biggest Challenge to Healthy Eating: emotional eating, patient is not concerned about weight or blood sugars    Physical Activity:    Limitations: none  No regular exercise, walks in place at bedtime if BG is >200 mg/dl    Diabetes Complications:  Acute  "Complications: At risk for hypoglycemia? yes  Frequency of hypoglycemia: none    Vitals:  There were no vitals taken for this visit.  Estimated body mass index is 36.09 kg/(m^2) as calculated from the following:    Height as of 9/21/17: 1.594 m (5' 2.75\").    Weight as of 9/21/17: 91.7 kg (202 lb 1.6 oz).   Last 3 BP:   BP Readings from Last 3 Encounters:   09/21/17 134/62   09/07/17 122/70   11/29/16 128/70       History   Smoking Status     Never Smoker   Smokeless Tobacco     Never Used       Labs:  Lab Results   Component Value Date    A1C 7.9 08/22/2017     Lab Results   Component Value Date     08/22/2017     Lab Results   Component Value Date    LDL 88 08/22/2017     HDL Cholesterol   Date Value Ref Range Status   08/22/2017 64 >49 mg/dL Final   ]  GFR Estimate   Date Value Ref Range Status   08/22/2017 79 >60 mL/min/1.7m2 Final     Comment:     Non  GFR Calc     GFR Estimate If Black   Date Value Ref Range Status   08/22/2017 >90 >60 mL/min/1.7m2 Final     Comment:      GFR Calc     Lab Results   Component Value Date    CR 0.73 08/22/2017     No results found for: MICROALBUMIN    Socio/Economic Considerations:    Support system: family    Health Beliefs and Attitudes:   Patient Activation Measure Survey Score:  No flowsheet data found.    Stage of Change: ACTION (Actively working towards change)      Diabetes knowledge and skills assessment:     Patient is knowledgeable in diabetes management concepts related to: Healthy Eating, Being Active, Monitoring, Taking Medication, Problem Solving and Reducing Risks    Patient needs further education on the following diabetes management concepts: Healthy Eating, Monitoring, Taking Medication, Problem Solving, Reducing Risks, Healthy Coping      Barriers to Learning Assessment: No Barriers identified    Based on learning assessment above, most appropriate setting for further diabetes education would be: Individual " "setting.    INTERVENTION:   Education provided today on:  AADE Self-Care Behaviors:  Healthy Eting: carbohydrate counting, consistency in amount, composition, and timing of food intake, weight reduction, portion control, and several questions about supplements and Calcium intake, pt resistant to limiting her food intake (\"would take too much chelsy out of her day\")- however is willing to divide breakfast into two meals as a way to reduce carb intake in the morning  Being Active: relationship to blood glucose  Monitoring: individual blood glucose targets  Taking Medication: noted pt does not want to make a change in her DM medications, discussed when to take, as pt reports \"sometimes takes a Glyberide at bedtime if BG >200 mg/dl\"  Problem Solving: high blood glucose - causes, signs/symptoms, treatment and prevention  Reducing Risks: prevention, early diagnostic measures and treatment of complications and A1C - goals, relating to blood glucose levels, how often to check  Healthy Coping: benefits of making appropriate lifestyle changes    Opportunities for ongoing education and support in diabetes-self management were discussed.    Pt verbalized understanding of concepts discussed and recommendations provided today.       Education Materials Provided:  Calcium Content of Food    PLAN:  Meal Plan Recommendation: Calcium and Vit. D supplementation as discussed (per Dr. Shannon recommendations), try splitting breakfast meal into 2 meals to help reduce total amount of carbs per meal  Exercise / activity plan: increase walking as able    FOLLOW-UP:  Follow-up as needed.   Chart routed to referring provider.    Ongoing plan for education and support: Written resources (magazines, books, etc.), Follow-up visit with diabetes educator  and Follow-up with primary care provider    Kimberly Coulter RD, CDE  Diabetes     Time Spent: 60 minutes  Encounter Type: Individual    Any diabetes medication dose changes were " made via the CDE Protocol and Collaborative Practice Agreement with the patient's endocrinology provider. A copy of this encounter was shared with the provider.

## 2017-12-28 DIAGNOSIS — E11.65 TYPE 2 DIABETES MELLITUS WITH HYPERGLYCEMIA, WITHOUT LONG-TERM CURRENT USE OF INSULIN (H): ICD-10-CM

## 2017-12-28 DIAGNOSIS — E21.3 HYPERPARATHYROIDISM (H): ICD-10-CM

## 2017-12-28 LAB
HBA1C MFR BLD: 7.7 % (ref 4.3–6)
PTH-INTACT SERPL-MCNC: 87 PG/ML (ref 12–72)

## 2017-12-28 PROCEDURE — 82306 VITAMIN D 25 HYDROXY: CPT | Performed by: INTERNAL MEDICINE

## 2017-12-28 PROCEDURE — 83735 ASSAY OF MAGNESIUM: CPT | Performed by: INTERNAL MEDICINE

## 2017-12-28 PROCEDURE — 84443 ASSAY THYROID STIM HORMONE: CPT | Performed by: INTERNAL MEDICINE

## 2017-12-28 PROCEDURE — 84100 ASSAY OF PHOSPHORUS: CPT | Performed by: INTERNAL MEDICINE

## 2017-12-28 PROCEDURE — 83036 HEMOGLOBIN GLYCOSYLATED A1C: CPT | Performed by: INTERNAL MEDICINE

## 2017-12-28 PROCEDURE — 36415 COLL VENOUS BLD VENIPUNCTURE: CPT | Performed by: INTERNAL MEDICINE

## 2017-12-28 PROCEDURE — 83970 ASSAY OF PARATHORMONE: CPT | Performed by: INTERNAL MEDICINE

## 2017-12-29 LAB
DEPRECATED CALCIDIOL+CALCIFEROL SERPL-MC: 42 UG/L (ref 20–75)
MAGNESIUM SERPL-MCNC: 2.1 MG/DL (ref 1.6–2.3)
PHOSPHATE SERPL-MCNC: 2.7 MG/DL (ref 2.5–4.5)
TSH SERPL DL<=0.005 MIU/L-ACNC: 1.23 MU/L (ref 0.4–4)

## 2018-01-04 ENCOUNTER — OFFICE VISIT (OUTPATIENT)
Dept: ENDOCRINOLOGY | Facility: CLINIC | Age: 73
End: 2018-01-04
Payer: COMMERCIAL

## 2018-01-04 VITALS
WEIGHT: 200.6 LBS | TEMPERATURE: 97.4 F | SYSTOLIC BLOOD PRESSURE: 130 MMHG | OXYGEN SATURATION: 97 % | HEART RATE: 74 BPM | BODY MASS INDEX: 35.54 KG/M2 | HEIGHT: 63 IN | DIASTOLIC BLOOD PRESSURE: 64 MMHG

## 2018-01-04 DIAGNOSIS — E11.65 TYPE 2 DIABETES MELLITUS WITH HYPERGLYCEMIA, WITHOUT LONG-TERM CURRENT USE OF INSULIN (H): Primary | ICD-10-CM

## 2018-01-04 DIAGNOSIS — E21.3 HYPERPARATHYROIDISM (H): ICD-10-CM

## 2018-01-04 DIAGNOSIS — I25.9 CHRONIC ISCHEMIC HEART DISEASE: ICD-10-CM

## 2018-01-04 DIAGNOSIS — E03.8 OTHER SPECIFIED HYPOTHYROIDISM: ICD-10-CM

## 2018-01-04 DIAGNOSIS — L30.9 ECZEMA, UNSPECIFIED TYPE: ICD-10-CM

## 2018-01-04 PROCEDURE — 99214 OFFICE O/P EST MOD 30 MIN: CPT | Performed by: INTERNAL MEDICINE

## 2018-01-04 NOTE — PATIENT INSTRUCTIONS
[facility] & [place] locations   Dr Shannon, Endocrinology Department      [facility]   [address]  [place] [address]  Appointment [phone]  Fax: [phone]  [place]: Monday and Tuesday         [facility]   [address]  [place] [address]  Appointment [phone]  Fax: [phone]  [place]: Wednesday and Thursday            Please arrive 30 minutes before your scheduled appointment.   First go to the main floor lab suit  for previsit A1c lab appointment and then come upstairs and get checked in with  for clinic visit.  This will allow for your blood glucose meter/insulin pump to be downloaded and glycosylated hemoglobin (A1c) to be obtained prior to your appointment.      Continue current medication for diabetes  Cut down on potatoes  Decrease quantity of popcorn/afternoon snack  Be consistent with medications and meals.    Take metformin 1.5 mg in AM and 1.5 mg with popcorn  Take glyburide 2.5 mg twice a day with meals.    Labs and follow up in 3 months.    Recommend checking blood sugars before meals and at bedtime.    If Blood glucose are low more often-> 2-3 times/week- give us a call.  The patient is advised to Make better food choices: reduce carbs, Reduce portion size, weight loss and exercise 3-4 times a week.  Discussed hypoglycemia signs and symptoms as well as management in detail.

## 2018-01-04 NOTE — NURSING NOTE
"Chief Complaint   Patient presents with     RECHECK     follow up DM2 hypothyroidism, parathyroid LOV 17       Initial /64 (BP Location: Left arm, Patient Position: Chair, Cuff Size: Adult Large)  Pulse 74  Temp 97.4  F (36.3  C) (Oral)  Ht 1.594 m (5' 2.75\")  Wt 91 kg (200 lb 9.6 oz)  SpO2 97%  BMI 35.82 kg/m2 Estimated body mass index is 35.82 kg/(m^2) as calculated from the following:    Height as of this encounter: 1.594 m (5' 2.75\").    Weight as of this encounter: 91 kg (200 lb 9.6 oz).  Medication Reconciliation: complete     ENDOCRINOLOGY INTAKE FORM    Patient Name:  Margaret Ramos  :  1945    Is patient Diabetic?   Yes: type 2   Does patient have non-diabetic or other endocrine issues?  Yes: hypothyroidism and parathyroid    Vitals: /64 (BP Location: Left arm, Patient Position: Chair, Cuff Size: Adult Large)  Pulse 74  Temp 97.4  F (36.3  C) (Oral)  Ht 1.594 m (5' 2.75\")  Wt 91 kg (200 lb 9.6 oz)  SpO2 97%  BMI 35.82 kg/m2  BMI= Body mass index is 35.82 kg/(m^2).    Flu vaccine:  No  Pneumonia vaccine:  No    Smoking and Alcohol use:  Social History   Substance Use Topics     Smoking status: Never Smoker     Smokeless tobacco: Never Used     Alcohol use No       Foot Exam: Yes: 17  Eye Exam:  No  Dental Exam:  Yes:   Aspirin Use:  Yes: 81 mg daily     Lab Results   Component Value Date    A1C 7.7 2017    A1C 7.9 2017    A1C 7.9 2016    A1C 8.0 2016    A1C 8.1 2015       Lab Results   Component Value Date    MICROL <5 2016     No results found for: MICROALBUMIN      Staff Signature:  Marcela Glaser CMA (AAMA)        "

## 2018-01-04 NOTE — PROGRESS NOTES
Name: Margaret Ramos  Seen for f/u. [facility]   HPI:  Margaret Ramos is a 72 year old female who presents for the evaluation/management of DM.  She typically goes to bed late at night around midnight-2:00 in the morning and wakes up around 10-11:00 in the morning.  Typically has breakfast around noon, afternoon snack of popcorn, supper and typically has bedtime snack every single night.  It involves cup of applesauce and 2 crackers.  She tells me that she is not consistent with evening dose of glyburide and is missing metformin in afternoon.  She is worried about hypoglycemia.  He does not like to take medications.    1. Type 2 DM:  Orginally diagnosed at the age of: 54 in 11/99. Initially on glucovance, then switched to metformin and glyburide due to formulary issues.  Current Regimen: Metformin 750 mg qam,metformin  qpm and glyburide 2.5 mg bid( not consistent)  BS checks: bid  Average Meter Download: 177    Complications:   Diabetes Complications  Description / Detail    Diabetic Retinopathy  No   CAD / PAD  Yes. S/p stent placement   Neuropathy  No   Nephropathy / Microalbuminuria  No   Gastroparesis  No   Hypoglycemia Unawarness  No     2. Hypertension: Blood Pressure today:   BP Readings from Last 3 Encounters:   01/04/18 130/64   09/21/17 134/62   09/07/17 122/70      Blood pressure medications include none. Denies feeling lightheaded or dizzy.    3. Hyperlipidemia: Takes lipitor for lipid control.  Denies muscle aches of pains.       PMH/PSH:  Past Medical History:   Diagnosis Date     ASCVD (arteriosclerotic cardiovascular disease) 8/10/2016     Chronic ischemic heart disease 6/13/2016     Hypothyroidism 10/20/2014     Inferior MI (H) 8/10/2016    2016      Mixed hyperlipidemia (DYSLIPIDEMIA) 10/20/2014     Morbid obesity (H) 10/13/2015     S/P drug eluting coronary stent placement 8/10/2016    RCA 6/2016      Type 2 diabetes mellitus with hyperglycemia (H) 10/20/2014       Past Surgical History:  "  Procedure Laterality Date     APPENDECTOMY      age 7     BACK SURGERY  1978     CARPAL TUNNEL RELEASE RT/LT      bilateral     wisdom teeth extraction       Family Hx:  Family History   Problem Relation Age of Onset     DIABETES Mother      type 2     DIABETES Sister      both sisters have type 2     Coronary Artery Disease Sister      Thyroid disease: No         DM2: Yes - sisters,mother         Autoimmune: DM1, SLE, RA, Vitiligo No    Social Hx:  Social History     Social History     Marital status: Single     Spouse name: N/A     Number of children: 0     Years of education: N/A     Occupational History     Not on file.     Social History Main Topics     Smoking status: Never Smoker     Smokeless tobacco: Never Used     Alcohol use No     Drug use: No     Sexual activity: Not Currently     Other Topics Concern     Parent/Sibling W/ Cabg, Mi Or Angioplasty Before 65f 55m? No     Social History Narrative          MEDICATIONS:  has a current medication list which includes the following prescription(s): metformin, glyburide, blood glucose monitoring, levothyroxine, blood glucose monitoring, atorvastatin, magnesium sulfate, calcium, albuterol, ACE/ARB NOT PRESCRIBED, INTENTIONAL,, ascorbic acid, centrum silver, metoprolol, ketoconazole, ACE NOT PRESCRIBED, INTENTIONAL,, aspirin, cholecalciferol, coq-10, UNABLE TO FIND, and clopidogrel.    ROS     ROS: 10 point ROS neg other than the symptoms noted above in the HPI.    Physical Exam   VS: /64 (BP Location: Left arm, Patient Position: Chair, Cuff Size: Adult Large)  Pulse 74  Temp 97.4  F (36.3  C) (Oral)  Ht 1.594 m (5' 2.75\")  Wt 91 kg (200 lb 9.6 oz)  SpO2 97%  BMI 35.82 kg/m2  GENERAL: AXOX3, NAD, well dressed, answering questions appropriately, appears stated age.  HEENT: No exopthalmous, no proptosis, EOMI, no lig lag, no retraction  NECK: Thyroid normal in size, non tender, no nodules were palpated.  CV: RRR  LUNGS: CTAB  ABDOMEN: +BS  NEUROLOGY: " CN grossly intact, no tremors  PSYCH: normal affect and mood      LABS:  Lab Results   Component Value Date    A1C 7.7 12/28/2017    A1C 7.9 08/22/2017    A1C 7.9 11/29/2016    A1C 8.0 06/02/2016    A1C 8.1 12/03/2015       Lab Results   Component Value Date    UMALCR Unable to calculate due to low value 11/29/2016      ENDO CALCIUM LABS-UMP Latest Ref Rng & Units 8/22/2017 11/29/2016   CALCIUM 8.5 - 10.1 mg/dL 10.1 10.0     ENDO THYROID LABS-UMP Latest Ref Rng & Units 9/12/2017 11/29/2016   TSH 0.40 - 4.00 mU/L 0.86 0.76   T4 FREE 0.76 - 1.46 ng/dL 1.30 1.21   FREE T3 2.3 - 4.2 pg/mL 2.0 (L)      ENDO CALCIUM LABS-UMP Latest Ref Rng & Units 12/28/2017   PARATHYROID HORMONE INTACT 12 - 72 pg/mL 87 (H)     ENDO CALCIUM LABS-UMP Latest Ref Rng & Units 8/22/2017 11/29/2016   PARATHYROID HORMONE INTACT 12 - 72 pg/mL 100 (H) 100 (H)     Vitamin D Deficiency Screening Results:  Lab Results   Component Value Date    VITDT 42 12/28/2017    VITDT 48 09/12/2017    VITDT 41 11/29/2016    VITDT 36 12/08/2015    VITDT 48 10/20/2014       All pertinent notes, labs, and images personally reviewed by me.     A/P  Ms.Alice LEONARD Ramos is a 69 year old here for the evaluation/management of diabetes:    1. DM2 -under fair control. A1c 7.7%.  Dietary indiscretion playing a role in worsening control.  History of CAD status post stent placement.  Goal A1c less than 8.0%.  Fasting blood sugars are in acceptable range.  Limited blood sugar data during the day.  Before bedtime (before bedtime snack ) blood sugars are variable but mostly in normal range.  As noted above she tells me that she is not consistent with taking glyburide at supper resulting in high blood sugar numbers at bedtime.    No major episodes of hypoglycemia noted.    Plan:  Take metformin 1.5 mg in AM and 1.5 mg with popcorn  Take glyburide 2.5 mg twice a day with meals.  Pt is reluctant to take higher doses of metfomrin due to concerns about intolerance    Cut down on  potatoes  Decrease quantity of popcorn/afternoon snack  Be consistent with medications and meals.    Recommend checking blood sugars before meals and at bedtime.    If Blood glucose are low more often-> 2-3 times/week- give us a call.  The patient is advised to Make better food choices: reduce carbs, Reduce portion size, weight loss and exercise 3-4 times a week.  Discussed hypoglycemia signs and symptoms as well as management in detail.     2. Hypertension -under good control.  Previously ACE discussed, pt is reluctant to start.     3. Hyperlipidemia - Under Fair control.  Taking lipitor 10 mg qd, recommend dose increase.Pt is reluctant stating that higher doses cause constipation. States she is sensitive to chemicals.    4. Prevention  Flu Shot- declines  Pneumovax- 12/27/10  Opthalmology-Yes  Dental-Yes  ASA-yes  Smoking- No    5. Hypothyroidism    On levoT 112 mcg qd  with 1/2 tab on Sundays.  Continue current dose of levothyroxine.  Recent labs in normal range except slightly low free T3.  TSH and free T4 are in normal range.  Clinically looks euthyroid.  Labs in 3-6 months    6. Osteopenia  Dexa in 2013 shows osteopenia  -- cont calcium/D, ca 10.2--->10.1,     7.  Hyperparathyroidism :  Mildly elevated PTH at 100 c/w possible early primary hyperparathyroidism, check today.  Calcium normal.  Vitamin D normal.  Kidney function normal  DEXA showing osteoporosis. Not on medication at this time.   Repeat 2017 DEXA was done and results are pending  Follow up PTH improved to 87.  Plan- continue to monitor at this time in the setting of stable PTH, normal calcium levels.    Repeat labs in 3-6 months.  Consider parathyroid scan based on that.  Continue vitamin D replacement.    8. Osteoporosis:  Not discussed today in lieu of time.  I will ask my staff to have her make another visit for it.    All questions were answered.  The patient indicates understanding of the above issues and agrees with the plan set forth.        More than 50% of face to face time spent with Ms. Ramos on counseling / coordinating her care.  Total face to face time was 30 minutes.      Follow-up:  follow up in 3 months    Rosario Buenrostro  CC: Jossie Luciano MD

## 2018-01-04 NOTE — LETTER
1/4/2018         RE: Margaret Ramos  [address] [place] [address]        Dear Colleague,    Thank you for referring your patient, Margaret Ramos, to the [facility]. Please see a copy of my visit note below.    Name: Margaret Ramos  Seen for f/u. [facility]   HPI:  Margaret Ramos is a 72 year old female who presents for the evaluation/management of DM.  She typically goes to bed late at night around midnight-2:00 in the morning and wakes up around 10-11:00 in the morning.  Typically has breakfast around noon, afternoon snack of popcorn, supper and typically has bedtime snack every single night.  It involves cup of applesauce and 2 crackers.  She tells me that she is not consistent with evening dose of glyburide and is missing metformin in afternoon.  She is worried about hypoglycemia.  He does not like to take medications.    1. Type 2 DM:  Orginally diagnosed at the age of: 54 in 11/99. Initially on glucovance, then switched to metformin and glyburide due to formulary issues.  Current Regimen: Metformin 750 mg qam,metformin  qpm and glyburide 2.5 mg bid( not consistent)  BS checks: bid  Average Meter Download: 177    Complications:   Diabetes Complications  Description / Detail    Diabetic Retinopathy  No   CAD / PAD  Yes. S/p stent placement   Neuropathy  No   Nephropathy / Microalbuminuria  No   Gastroparesis  No   Hypoglycemia [place]arness  No     2. Hypertension: Blood Pressure today:   BP Readings from Last 3 Encounters:   01/04/18 130/64   09/21/17 134/62   09/07/17 122/70      Blood pressure medications include none. Denies feeling lightheaded or dizzy.    3. Hyperlipidemia: Takes lipitor for lipid control.  Denies muscle aches of pains.       PMH/PSH:  Past Medical History:   Diagnosis Date     ASCVD (arteriosclerotic cardiovascular disease) 8/10/2016     Chronic ischemic heart disease 6/13/2016     Hypothyroidism 10/20/2014     Inferior MI (H) 8/10/2016    2016      Mixed  "hyperlipidemia (DYSLIPIDEMIA) 10/20/2014     Morbid obesity (H) 10/13/2015     S/P drug eluting coronary stent placement 8/10/2016    RCA 6/2016      Type 2 diabetes mellitus with hyperglycemia (H) 10/20/2014       Past Surgical History:   Procedure Laterality Date     APPENDECTOMY      age 7     BACK SURGERY  1978     CARPAL TUNNEL RELEASE RT/LT      bilateral     wisdom teeth extraction       Family Hx:  Family History   Problem Relation Age of Onset     DIABETES Mother      type 2     DIABETES Sister      both sisters have type 2     Coronary Artery Disease Sister      Thyroid disease: No         DM2: Yes - sisters,mother         Autoimmune: DM1, SLE, RA, Vitiligo No    Social Hx:  Social History     Social History     Marital status: Single     Spouse name: N/A     Number of children: 0     Years of education: N/A     Occupational History     Not on file.     Social History Main Topics     Smoking status: Never Smoker     Smokeless tobacco: Never Used     Alcohol use No     Drug use: No     Sexual activity: Not Currently     Other Topics Concern     Parent/Sibling W/ Cabg, Mi Or Angioplasty Before 65f 55m? No     Social History Narrative          MEDICATIONS:  has a current medication list which includes the following prescription(s): metformin, glyburide, blood glucose monitoring, levothyroxine, blood glucose monitoring, atorvastatin, magnesium sulfate, calcium, albuterol, ACE/ARB NOT PRESCRIBED, INTENTIONAL,, ascorbic acid, centrum silver, metoprolol, ketoconazole, ACE NOT PRESCRIBED, INTENTIONAL,, aspirin, cholecalciferol, coq-10, UNABLE TO FIND, and clopidogrel.    ROS     ROS: 10 point ROS neg other than the symptoms noted above in the HPI.    Physical Exam   VS: /64 (BP Location: Left arm, Patient Position: Chair, Cuff Size: Adult Large)  Pulse 74  Temp 97.4  F (36.3  C) (Oral)  Ht 1.594 m (5' 2.75\")  Wt 91 kg (200 lb 9.6 oz)  SpO2 97%  BMI 35.82 kg/m2  GENERAL: AXOX3, NAD, well dressed, " answering questions appropriately, appears stated age.  HEENT: No exopthalmous, no proptosis, EOMI, no lig lag, no retraction  NECK: Thyroid normal in size, non tender, no nodules were palpated.  CV: RRR  LUNGS: CTAB  ABDOMEN: +BS  NEUROLOGY: CN grossly intact, no tremors  PSYCH: normal affect and mood      LABS:  Lab Results   Component Value Date    A1C 7.7 12/28/2017    A1C 7.9 08/22/2017    A1C 7.9 11/29/2016    A1C 8.0 06/02/2016    A1C 8.1 12/03/2015       Lab Results   Component Value Date    UMALCR Unable to calculate due to low value 11/29/2016      ENDO CALCIUM LABS-UMP Latest Ref Rng & Units 8/22/2017 11/29/2016   CALCIUM 8.5 - 10.1 mg/dL 10.1 10.0     ENDO THYROID LABS-UMP Latest Ref Rng & Units 9/12/2017 11/29/2016   TSH 0.40 - 4.00 mU/L 0.86 0.76   T4 FREE 0.76 - 1.46 ng/dL 1.30 1.21   FREE T3 2.3 - 4.2 pg/mL 2.0 (L)      ENDO CALCIUM LABS-UMP Latest Ref Rng & Units 12/28/2017   PARATHYROID HORMONE INTACT 12 - 72 pg/mL 87 (H)     ENDO CALCIUM LABS-UMP Latest Ref Rng & Units 8/22/2017 11/29/2016   PARATHYROID HORMONE INTACT 12 - 72 pg/mL 100 (H) 100 (H)     Vitamin D Deficiency Screening Results:  Lab Results   Component Value Date    VITDT 42 12/28/2017    VITDT 48 09/12/2017    VITDT 41 11/29/2016    VITDT 36 12/08/2015    VITDT 48 10/20/2014       All pertinent notes, labs, and images personally reviewed by me.     A/P  Ms.Alice LEONARD Ramos is a 69 year old here for the evaluation/management of diabetes:    1. DM2 -under fair control. A1c 7.7%.  Dietary indiscretion playing a role in worsening control.  History of CAD status post stent placement.  Goal A1c less than 8.0%.  Fasting blood sugars are in acceptable range.  Limited blood sugar data during the day.  Before bedtime (before bedtime snack ) blood sugars are variable but mostly in normal range.  As noted above she tells me that she is not consistent with taking glyburide at supper resulting in high blood sugar numbers at bedtime.    No major  episodes of hypoglycemia noted.    Plan:  Take metformin 1.5 mg in AM and 1.5 mg with popcorn  Take glyburide 2.5 mg twice a day with meals.  Pt is reluctant to take higher doses of metfomrin due to concerns about intolerance    Cut down on potatoes  Decrease quantity of popcorn/afternoon snack  Be consistent with medications and meals.    Recommend checking blood sugars before meals and at bedtime.    If Blood glucose are low more often-> 2-3 times/week- give us a call.  The patient is advised to Make better food choices: reduce carbs, Reduce portion size, weight loss and exercise 3-4 times a week.  Discussed hypoglycemia signs and symptoms as well as management in detail.     2. Hypertension -under good control.  Previously ACE discussed, pt is reluctant to start.     3. Hyperlipidemia - Under Fair control.  Taking lipitor 10 mg qd, recommend dose increase.Pt is reluctant stating that higher doses cause constipation. States she is sensitive to chemicals.    4. Prevention  Flu Shot- declines  Pneumovax- 12/27/10  Opthalmology-Yes  Dental-Yes  ASA-yes  Smoking- No    5. Hypothyroidism    On levoT 112 mcg qd  with 1/2 tab on Sundays.  Continue current dose of levothyroxine.  Recent labs in normal range except slightly low free T3.  TSH and free T4 are in normal range.  Clinically looks euthyroid.  Labs in 3-6 months    6. Osteopenia  Dexa in 2013 shows osteopenia  -- cont calcium/D, ca 10.2--->10.1,     7.  Hyperparathyroidism :  Mildly elevated PTH at 100 c/w possible early primary hyperparathyroidism, check today.  Calcium normal.  Vitamin D normal.  Kidney function normal  DEXA showing osteoporosis. Not on medication at this time.   Repeat 2017 DEXA was done and results are pending  Follow up PTH improved to 87.  Plan- continue to monitor at this time in the setting of stable PTH, normal calcium levels.    Repeat labs in 3-6 months.  Consider parathyroid scan based on that.  Continue vitamin D replacement.    8.  Osteoporosis:  Not discussed today in lieu of time.  I will ask my staff to have her make another visit for it.    All questions were answered.  The patient indicates understanding of the above issues and agrees with the plan set forth.       More than 50% of face to face time spent with Ms. Ramos on counseling / coordinating her care.  Total face to face time was 30 minutes.      Follow-up:  follow up in 3 months    Rosario Buenrostro  CC: Jossie Luciano MD    Again, thank you for allowing me to participate in the care of your patient.        Sincerely,        Rosario Shannon MD

## 2018-01-04 NOTE — MR AVS SNAPSHOT
After Visit Summary   2018    Margaret Ramos    MRN: [id number]           Patient Information     Date Of Birth          1945        Visit Information        Provider Department      2018 1:30 PM Rosario Shannon MD [facility]        Today's Diagnoses     Type 2 diabetes mellitus with hyperglycemia, without long-term current use of insulin (H)    -  1    Other specified hypothyroidism          Care Instructions    [facility] & [place] locations   Dr Shannon, Endocrinology Department      [facility]   [address]  [place] [address]  Appointment [phone]  Fax: [phone]  [place]: Monday and Tuesday         [facility]   [address]  [place] [address]  Appointment [phone]  Fax: [phone]  [place]: Wednesday and Thursday            Please arrive 30 minutes before your scheduled appointment.   First go to the main floor lab suit  for previsit A1c lab appointment and then come upstairs and get checked in with  for clinic visit.  This will allow for your blood glucose meter/insulin pump to be downloaded and glycosylated hemoglobin (A1c) to be obtained prior to your appointment.      Continue current medication for diabetes  Cut down on potatoes  Decrease quantity of popcorn/afternoon snack  Be consistent with medications and meals.    Take metformin 1.5 mg in AM and 1.5 mg with popcorn  Take glyburide 2.5 mg twice a day with meals.    Labs and follow up in 3 months.    Recommend checking blood sugars before meals and at bedtime.    If Blood glucose are low more often-> 2-3 times/week- give us a call.  The patient is advised to Make better food choices: reduce carbs, Reduce portion size, weight loss and exercise 3-4 times a week.  Discussed hypoglycemia signs and symptoms as well as management in detail.                     "Follow-ups after your visit        Future tests that were ordered for you today     Open Future Orders        Priority Expected Expires Ordered    Hemoglobin A1c Routine  10/31/2018 2018            Who to contact     If you have questions or need follow up information about today's clinic visit or your schedule please contact [facility] directly at [phone].  Normal or non-critical lab and imaging results will be communicated to you by MyChart, letter or phone within 4 business days after the clinic has received the results. If you do not hear from us within 7 days, please contact the clinic through XbyMehart or phone. If you have a critical or abnormal lab result, we will notify you by phone as soon as possible.  Submit refill requests through Commissioner or call your pharmacy and they will forward the refill request to us. Please allow 3 business days for your refill to be completed.          Additional Information About Your Visit        XbyMehart Information     Commissioner lets you send messages to your doctor, view your test results, renew your prescriptions, schedule appointments and more. To sign up, go to [email or web address] . Click on \"Log in\" on the left side of the screen, which will take you to the Welcome page. Then click on \"Sign up Now\" on the right side of the page.     You will be asked to enter the access code listed below, as well as some personal information. Please follow the directions to create your username and password.     Your access code is: [email or web address]  Expires: 2018  2:07 PM     Your access code will  in 90 days. If you need help or a new code, please call your [place] clinic or [phone].        Care EveryWhere ID     This is your Care EveryWhere ID. This could be used by other organizations to access your [place] medical records  [id number]        Your Vitals Were     Pulse Temperature Height Pulse Oximetry BMI (Body Mass Index)       74 97.4  F " "(36.3  C) (Oral) 1.594 m (5' 2.75\") 97% 35.82 kg/m2        Blood Pressure from Last 3 Encounters:   01/04/18 130/64   09/21/17 134/62   09/07/17 122/70    Weight from Last 3 Encounters:   01/04/18 91 kg (200 lb 9.6 oz)   09/21/17 91.7 kg (202 lb 1.6 oz)   09/07/17 89.8 kg (198 lb)                 Today's Medication Changes          These changes are accurate as of: 1/4/18  2:12 PM.  If you have any questions, ask your nurse or doctor.               These medicines have changed or have updated prescriptions.        Dose/Directions    atorvastatin 20 MG tablet   Commonly known as:  LIPITOR   This may have changed:  how much to take   Used for:  Routine general medical examination at a health care facility, Type 2 diabetes mellitus without complication, without long-term current use of insulin (H)        Dose:  80 mg   Take 4 tablets (80 mg) by mouth daily   Quantity:  90 tablet   Refills:  1                Primary Care Provider Office Phone # Fax #    Jossie Luciano [phone] [address] [place] [address]        Equal Access to Services     [facility] AH: Hadii eulalia Salguero, waaxda lushahida, qaybta kaalmada amber, mookie carpenter. So [facility] [phone].    ATENCIÓN: Si habla español, tiene a delgado disposición servicios gratuitos de asistencia lingüística. [facility] [phone].    We comply with applicable federal civil rights laws and [place] laws. We do not discriminate on the basis of race, color, national origin, age, disability, sex, sexual orientation, or gender identity.            Thank you!     Thank you for choosing [facility]  for your care. Our goal is always to provide you with excellent care. Hearing back from our patients is one way we can continue to improve our services. Please take a few minutes to complete the written survey that you may receive in the mail after your visit with us. Thank you!      "        Your Updated Medication List - Protect others around you: Learn how to safely use, store and throw away your medicines at www.disposemymeds.org.          This list is accurate as of: 1/4/18  2:12 PM.  Always use your most recent med list.                   Brand Name Dispense Instructions for use Diagnosis    ACE NOT PRESCRIBED (INTENTIONAL)      ACE Inhibitor not prescribed due to Refusal by patient        ACE/ARB/ARNI NOT PRESCRIBED (INTENTIONAL)      ACE & ARB not prescribed due to Symptomatic hypotension not due to excessive diuresis    Type 2 diabetes mellitus without complication, without long-term current use of insulin (H)       albuterol (2.5 MG/3ML) 0.083% neb solution     360 mL    Take 1 vial (2.5 mg) by nebulization every 6 hours as needed for shortness of breath / dyspnea or wheezing    Mild asthma       ascorbic acid 500 MG tablet    VITAMIN C     Take 1 tablet by mouth        aspirin 81 MG EC tablet     90 tablet    Take 1 tablet (81 mg) by mouth daily    Diabetes mellitus, type 2 (H)       atorvastatin 20 MG tablet    LIPITOR    90 tablet    Take 4 tablets (80 mg) by mouth daily    Routine general medical examination at a health care facility, Type 2 diabetes mellitus without complication, without long-term current use of insulin (H)       blood glucose monitoring lancets     400 each    Use as directed upto 6 times daily    Other specified hypothyroidism, Type 2 diabetes mellitus without complication, without long-term current use of insulin (H), Mixed hyperlipidemia       blood glucose monitoring test strip    ACCU-CHEK JINA PLUS    400 each    Use as directed up to 6 times daily    Other specified hypothyroidism, Mixed hyperlipidemia, Type 2 diabetes mellitus without complication, without long-term current use of insulin (H)       CALCIUM PO           CENTRUM SILVER per tablet      daily (every 24 hours).        CoQ-10 200 MG Caps      Take 200 mg by mouth daily        glyBURIDE 2.5 MG  tablet    DIABETA /MICRONASE    270 tablet    Take 1 tablet (2.5 mg) by mouth 2 times daily (with meals)    Type 2 diabetes mellitus without complication, without long-term current use of insulin (H)       ketoconazole 2 % cream    NIZORAL    60 g    Apply topically 2 times daily    Type 2 diabetes mellitus with hyperglycemia (H)       levothyroxine 112 MCG tablet    SYNTHROID/LEVOTHROID    90 tablet    Take 1 tablet (112 mcg) by mouth daily Take 1/2 tab on Sundays    Other specified hypothyroidism, Mixed hyperlipidemia       magnesium sulfate 500 mg/mL Soln      Take 300 mg by mouth        metFORMIN 500 MG tablet    GLUCOPHAGE    360 tablet    Take 1 tablet (500 mg) by mouth 3 times daily (with meals) Take 1 and 1/2 tab qam, 1/2 tab q lunch and 1/2 tab q pm    Type 2 diabetes mellitus without complication, without long-term current use of insulin (H)       metoprolol 25 MG 24 hr tablet    TOPROL-XL     Take 0.5 tablets (12.5 mg) by mouth daily        PLAVIX 75 MG tablet   Generic drug:  clopidogrel      Take 1 tablet (75 mg) by mouth daily    Chronic ischemic heart disease       UNABLE TO FIND      MEDICATION NAME: Super Beet        VITAMIN D3 PO      Take 2,000 Units by mouth daily

## 2018-02-27 ENCOUNTER — OFFICE VISIT (OUTPATIENT)
Dept: OPTOMETRY | Facility: CLINIC | Age: 73
End: 2018-02-27
Payer: COMMERCIAL

## 2018-02-27 DIAGNOSIS — H04.129 DRY EYE: ICD-10-CM

## 2018-02-27 DIAGNOSIS — H10.503 BLEPHAROCONJUNCTIVITIS OF BOTH EYES, UNSPECIFIED BLEPHAROCONJUNCTIVITIS TYPE: Primary | ICD-10-CM

## 2018-02-27 DIAGNOSIS — H02.889 MEIBOMIAN GLAND DYSFUNCTION: ICD-10-CM

## 2018-02-27 PROCEDURE — 99202 OFFICE O/P NEW SF 15 MIN: CPT | Performed by: OPTOMETRIST

## 2018-02-27 ASSESSMENT — EXTERNAL EXAM - LEFT EYE: OS_EXAM: NORMAL

## 2018-02-27 ASSESSMENT — VISUAL ACUITY
OD_SC: 20/30
OS_SC: 20/25
METHOD: SNELLEN - LINEAR
OD_SC+: -1
OS_SC+: -2

## 2018-02-27 ASSESSMENT — EXTERNAL EXAM - RIGHT EYE: OD_EXAM: NORMAL

## 2018-02-27 NOTE — PROGRESS NOTES
Chief Complaint   Patient presents with     Eye Problem Both Eyes     Red, itchy, stinging eyes   Started few days ago, woke up with itchy in corner, red eyes, eyes are dry and uses OTC rewetting.  Has had some crusting this winter, uses NP equate artificial tears    Started to feel better , had a foreign body sensation that improved with irrigating with distilled water  Foreign body sensation is better, may have got a cut hair in her eye after a haircut  She has not used allergy eye drops in the past  Concerns with passing drivers test for renewal    Multiple drug allergies reviewed list      Do you wear contact lenses? No  HPI    Symptoms:     Redness   Itching   Burning                      Clare López, OD     See Review Of Systems       Medical, surgical and family histories reviewed and updated 2/27/2018.         OBJECTIVE: See Ophthalmology exam    ASSESSMENT:    ICD-10-CM    1. Blepharoconjunctivitis of both eyes, unspecified blepharoconjunctivitis type - Both Eyes H10.503 tobramycin-dexamethasone (TOBRADEX) ophthalmic ointment   2. Meibomian gland dysfunction - Both Eyes H02.89    3. Dry eye - Both Eyes H04.129     R>>L  PLAN:  Warm compresses lid hygiene, increase NP artificial tears    Currently taking flax seed in am  Add lid hygiene if NO IMPROVEMENT  Add tobradex ointment to lid margins at bedtime for a week   Return to clinic as needed    Clare López OD

## 2018-02-27 NOTE — LETTER
2/27/2018         RE: Margaret Ramos  [address] [place] [address]        Dear Colleague,    Thank you for referring your patient, Margaret Ramos, to the [facility]. Please see a copy of my visit note below.    Chief Complaint   Patient presents with     Eye Problem Both Eyes     Red, itchy, stinging eyes   Started few days ago, woke up with itchy in corner, red eyes, eyes are dry and uses OTC rewetting.  Has had some crusting this winter, uses NP equate artificial tears    Started to feel better , had a foreign body sensation that improved with irrigating with distilled water  Foreign body sensation is better, may have got a cut hair in her eye after a haircut  She has not used allergy eye drops in the past  Concerns with passing drivers test for renewal    Multiple drug allergies reviewed list      Do you wear contact lenses? No  HPI    Symptoms:     Redness   Itching   Burning                      Clare López, OD     See Review Of Systems       Medical, surgical and family histories reviewed and updated 2/27/2018.         OBJECTIVE: See Ophthalmology exam    ASSESSMENT:    ICD-10-CM    1. Blepharoconjunctivitis of both eyes, unspecified blepharoconjunctivitis type - Both Eyes H10.503 tobramycin-dexamethasone (TOBRADEX) ophthalmic ointment   2. Meibomian gland dysfunction - Both Eyes H02.89    3. Dry eye - Both Eyes H04.129     R>>L  PLAN:  Warm compresses lid hygiene, increase NP artificial tears    Currently taking flax seed in am  Add lid hygiene if NO IMPROVEMENT  Add tobradex ointment to lid margins at bedtime for a week   Return to clinic as needed    Clare López OD     Again, thank you for allowing me to participate in the care of your patient.        Sincerely,        Clare López, OD

## 2018-02-27 NOTE — MR AVS SNAPSHOT
After Visit Summary   2/27/2018    Margaret Ramos    MRN: [id number]           Patient Information     Date Of Birth          1945        Visit Information        Provider Department      2/27/2018 9:20 AM Clare López OD [facility]        Today's Diagnoses     Blepharoconjunctivitis of both eyes, unspecified blepharoconjunctivitis type    -  1      Care Instructions    Blepharitis is a chronic or long term inflammation of the eyelids and eyelashes. It affects all ages. Causes include poor eyelid hygiene, excess oil production, staph bacteria or an allergic reaction.    Blepharitis may appear as greasy flakes on the base of the eyelashes, crusting of eyelashes and mild redness of the eyelid margins.  Sometimes it may result in an acute infection of a gland in the eyelid called a stye and sometimes painless firm nodules can form in the eyelid.    Treatment includes warm compresses and lid hygiene with an antimicrobial lid scrub and sometimes a prescription ointment is needed.      Hot compresses/ warm soaks  Warm compresses are very beneficial to the normal functioning of the eye.   They help loosen up the eyelid debris that has collected on the eyelash follicles.  Eyelid cleansers maintain clean and healthy eyelid margins. Ocusoft or sterilid are commercial products that are available as individual wrapped cleansing pads    Directions for warm soaks  There are few methods for hot compresses. Moisten a washcloth with hot water, or microwave for 10 seconds, being careful to not get the cloth too hot.   Then put the washcloth onto your eyelids for 5 minutes. It will cool quickly so a rice pack or eyemask that can be heated and laid on top of the washcloth will help retain the heat.  Overabundance of bacterial microorganisms along the eyelashes and lid margins induce stress on the tear film and promote inflammation.  Regular lid hygiene helps diminish the bacterial population to  "prevent inflammation and infection.  Use a warm compress to loosen crusts   Cleanse lids once daily with a lid cleansing product as directed such as Ocusoft or Sterilid which can be purchased at most pharmacies. Diluted baby shampoo will also work, but not as well.     Add prescription ointment to lid margins at bedtime for one week                   Follow-ups after your visit        Who to contact     If you have questions or need follow up information about today's clinic visit or your schedule please contact [facility] directly at [phone].  Normal or non-critical lab and imaging results will be communicated to you by Jampphart, letter or phone within 4 business days after the clinic has received the results. If you do not hear from us within 7 days, please contact the clinic through Cellfiret or phone. If you have a critical or abnormal lab result, we will notify you by phone as soon as possible.  Submit refill requests through Adreal or call your pharmacy and they will forward the refill request to us. Please allow 3 business days for your refill to be completed.          Additional Information About Your Visit        MyChart Information     Adreal lets you send messages to your doctor, view your test results, renew your prescriptions, schedule appointments and more. To sign up, go to [email or web address] . Click on \"Log in\" on the left side of the screen, which will take you to the Welcome page. Then click on \"Sign up Now\" on the right side of the page.     You will be asked to enter the access code listed below, as well as some personal information. Please follow the directions to create your username and password.     Your access code is: [email or web address]  Expires: 2018  2:07 PM     Your access code will  in 90 days. If you need help or a new code, please call your [facility] or [phone].        Care EveryWhere ID     This is your Care EveryWhere ID. This could be used by " other organizations to access your [place] medical records  [id number]         Blood Pressure from Last 3 Encounters:   01/04/18 130/64   09/21/17 134/62   09/07/17 122/70    Weight from Last 3 Encounters:   01/04/18 91 kg (200 lb 9.6 oz)   09/21/17 91.7 kg (202 lb 1.6 oz)   09/07/17 89.8 kg (198 lb)              Today, you had the following     No orders found for display         Today's Medication Changes          These changes are accurate as of 2/27/18  9:49 AM.  If you have any questions, ask your nurse or doctor.               Start taking these medicines.        Dose/Directions    tobramycin-dexamethasone ophthalmic ointment   Commonly known as:  TOBRADEX   Used for:  Blepharoconjunctivitis of both eyes, unspecified blepharoconjunctivitis type   Started by:  Clare López, NICHOLE        Dose:  0.5 inch   Place 0.5 inches into both eyes At Bedtime for 7 days   Quantity:  1 Tube   Refills:  0         These medicines have changed or have updated prescriptions.        Dose/Directions    atorvastatin 20 MG tablet   Commonly known as:  LIPITOR   This may have changed:  how much to take   Used for:  Routine general medical examination at a health care facility, Type 2 diabetes mellitus without complication, without long-term current use of insulin (H)        Dose:  80 mg   Take 4 tablets (80 mg) by mouth daily   Quantity:  90 tablet   Refills:  1            Where to get your medicines      Some of these will need a paper prescription and others can be bought over the counter.  Ask your nurse if you have questions.     Bring a paper prescription for each of these medications     tobramycin-dexamethasone ophthalmic ointment                Primary Care Provider Office Phone # Fax #    Jossie Luciano [phone] [address] [place] [address]        Equal Access to Services     JACIEL BOATENG AH: rosa Nogueira qaybta kaalmada adeegyada,  mookie bookerden esquivel'aan ah. So [facility] [phone].    ATENCIÓN: Si anniela kristal, tiene a delgado disposición servicios gratuitos de asistencia lingüística. Radha ashraf [phone].    We comply with applicable federal civil rights laws and [place] laws. We do not discriminate on the basis of race, color, national origin, age, disability, sex, sexual orientation, or gender identity.            Thank you!     Thank you for choosing [facility]  for your care. Our goal is always to provide you with excellent care. Hearing back from our patients is one way we can continue to improve our services. Please take a few minutes to complete the written survey that you may receive in the mail after your visit with us. Thank you!             Your Updated Medication List - Protect others around you: Learn how to safely use, store and throw away your medicines at www.disposemymeds.org.          This list is accurate as of 2/27/18  9:49 AM.  Always use your most recent med list.                   Brand Name Dispense Instructions for use Diagnosis    ACE NOT PRESCRIBED (INTENTIONAL)      ACE Inhibitor not prescribed due to Refusal by patient        ACE/ARB/ARNI NOT PRESCRIBED (INTENTIONAL)      ACE & ARB not prescribed due to Symptomatic hypotension not due to excessive diuresis    Type 2 diabetes mellitus without complication, without long-term current use of insulin (H)       albuterol (2.5 MG/3ML) 0.083% neb solution     360 mL    Take 1 vial (2.5 mg) by nebulization every 6 hours as needed for shortness of breath / dyspnea or wheezing    Mild asthma       ascorbic acid 500 MG tablet    VITAMIN C     Take 1 tablet by mouth        aspirin 81 MG EC tablet     90 tablet    Take 1 tablet (81 mg) by mouth daily    Diabetes mellitus, type 2 (H)       atorvastatin 20 MG tablet    LIPITOR    90 tablet    Take 4 tablets (80 mg) by mouth daily    Routine general medical examination at a health care facility, Type 2  diabetes mellitus without complication, without long-term current use of insulin (H)       blood glucose monitoring lancets     400 each    Use as directed upto 6 times daily    Other specified hypothyroidism, Type 2 diabetes mellitus without complication, without long-term current use of insulin (H), Mixed hyperlipidemia       blood glucose monitoring test strip    ACCU-CHEK JINA PLUS    400 each    Use as directed up to 6 times daily    Other specified hypothyroidism, Mixed hyperlipidemia, Type 2 diabetes mellitus without complication, without long-term current use of insulin (H)       CALCIUM PO           CENTRUM SILVER per tablet      daily (every 24 hours).        CoQ-10 200 MG Caps      Take 200 mg by mouth daily        glyBURIDE 2.5 MG tablet    DIABETA /MICRONASE    270 tablet    Take 1 tablet (2.5 mg) by mouth 2 times daily (with meals)    Type 2 diabetes mellitus without complication, without long-term current use of insulin (H)       ketoconazole 2 % cream    NIZORAL    60 g    Apply topically 2 times daily    Type 2 diabetes mellitus with hyperglycemia (H)       levothyroxine 112 MCG tablet    SYNTHROID/LEVOTHROID    90 tablet    Take 1 tablet (112 mcg) by mouth daily Take 1/2 tab on Sundays    Other specified hypothyroidism, Mixed hyperlipidemia       magnesium sulfate 500 mg/mL Soln      Take 300 mg by mouth        metFORMIN 500 MG tablet    GLUCOPHAGE    360 tablet    Take 1 tablet (500 mg) by mouth 3 times daily (with meals) Take 1 and 1/2 tab qam, 1/2 tab q lunch and 1/2 tab q pm    Type 2 diabetes mellitus without complication, without long-term current use of insulin (H)       metoprolol succinate 25 MG 24 hr tablet    TOPROL-XL     Take 0.5 tablets (12.5 mg) by mouth daily        PLAVIX 75 MG tablet   Generic drug:  clopidogrel      Take 1 tablet (75 mg) by mouth daily    Chronic ischemic heart disease       tobramycin-dexamethasone ophthalmic ointment    TOBRADEX    1 Tube    Place 0.5 inches  into both eyes At Bedtime for 7 days    Blepharoconjunctivitis of both eyes, unspecified blepharoconjunctivitis type       UNABLE TO FIND      MEDICATION NAME: Super Beet        VITAMIN D3 PO      Take 2,000 Units by mouth daily

## 2018-06-07 ENCOUNTER — TELEPHONE (OUTPATIENT)
Dept: ENDOCRINOLOGY | Facility: CLINIC | Age: 73
End: 2018-06-07

## 2018-06-07 NOTE — LETTER
June 7, 2018    Margaret Ramos  [address] [place] [address]    Dear Margaret,  This letter is being sent on behalf of Dr. Shannon. It is to remind you that your provider expected you to return for a follow up clinic visit. Please make a lab only appointment, and then follow up with a clinic visit one week afterwards to review those results.     You may call our office at [phone] ([place]) or [phone] ([place]) to schedule an appointment.     If you have already scheduled these appointments, please disregard this notice.      Sincerely,    [facility],  Dr. Rosario Shannon

## 2018-06-07 NOTE — TELEPHONE ENCOUNTER
Panel Management Review      Patient has the following on her problem list:     Asthma review   No flowsheet data found.   1. Is Asthma diagnosis on the Problem List? Yes    2. Is Asthma listed on Health Maintenance? No   3. Patient is due for:  none    Diabetes    ASA: Passed    Last A1C  Lab Results   Component Value Date    A1C 7.7 12/28/2017    A1C 7.9 08/22/2017    A1C 7.9 11/29/2016    A1C 8.0 06/02/2016    A1C 8.1 12/03/2015     A1C tested: Passed    Last LDL:    Lab Results   Component Value Date    CHOL 174 08/22/2017     Lab Results   Component Value Date    HDL 64 08/22/2017     Lab Results   Component Value Date    LDL 88 08/22/2017     Lab Results   Component Value Date    TRIG 109 08/22/2017     Lab Results   Component Value Date    CHOLHDLRATIO 3.7 04/30/2015     Lab Results   Component Value Date    NHDL 110 08/22/2017       Is the patient on a Statin? YES             Is the patient on Aspirin? YES    Medications     HMG CoA Reductase Inhibitors    atorvastatin (LIPITOR) 20 MG tablet    Salicylates    aspirin 81 MG EC tablet          Last three blood pressure readings:  BP Readings from Last 3 Encounters:   01/04/18 130/64   09/21/17 134/62   09/07/17 122/70       Date of last diabetes office visit: 1/4/18     Tobacco History:     History   Smoking Status     Never Smoker   Smokeless Tobacco     Never Used           Composite cancer screening  Chart review shows that this patient is due/due soon for the following Colonoscopy  Summary:    Patient is due/failing the following:   COLONOSCOPY and FOLLOW UP    Action needed:   Patient needs office visit for dm.    Type of outreach:    Sent letter.    Questions for provider review:    None                                                                                                                                    Marcela Glaser CMA ([facility])       Chart routed to closed .

## 2018-08-03 ENCOUNTER — TELEPHONE (OUTPATIENT)
Dept: PEDIATRICS | Facility: CLINIC | Age: 73
End: 2018-08-03

## 2018-08-03 DIAGNOSIS — E78.2 MIXED HYPERLIPIDEMIA: Primary | ICD-10-CM

## 2018-08-03 NOTE — TELEPHONE ENCOUNTER
Patient calling to request that a lipid panel be added to the other orders for lab work she is having. Also requesting liver function testing. Orders pended for signature. Unsure what dx to use for LFT's. [phone] ok to lm.   Rita Arana RN

## 2018-08-07 ENCOUNTER — TELEPHONE (OUTPATIENT)
Dept: PEDIATRICS | Facility: CLINIC | Age: 73
End: 2018-08-07

## 2018-08-10 DIAGNOSIS — E11.65 TYPE 2 DIABETES MELLITUS WITH HYPERGLYCEMIA, WITHOUT LONG-TERM CURRENT USE OF INSULIN (H): ICD-10-CM

## 2018-08-10 DIAGNOSIS — E21.3 HYPERPARATHYROIDISM (H): ICD-10-CM

## 2018-08-10 DIAGNOSIS — E78.2 MIXED HYPERLIPIDEMIA: ICD-10-CM

## 2018-08-10 DIAGNOSIS — E03.8 OTHER SPECIFIED HYPOTHYROIDISM: ICD-10-CM

## 2018-08-10 LAB
ALBUMIN SERPL-MCNC: 3.8 G/DL (ref 3.4–5)
ALP SERPL-CCNC: 115 U/L (ref 40–150)
ALT SERPL W P-5'-P-CCNC: 36 U/L (ref 0–50)
ANION GAP SERPL CALCULATED.3IONS-SCNC: 6 MMOL/L (ref 3–14)
AST SERPL W P-5'-P-CCNC: 22 U/L (ref 0–45)
BILIRUB DIRECT SERPL-MCNC: 0.1 MG/DL (ref 0–0.2)
BILIRUB SERPL-MCNC: 0.6 MG/DL (ref 0.2–1.3)
BUN SERPL-MCNC: 15 MG/DL (ref 7–30)
CALCIUM SERPL-MCNC: 9.8 MG/DL (ref 8.5–10.1)
CHLORIDE SERPL-SCNC: 103 MMOL/L (ref 94–109)
CHOLEST SERPL-MCNC: 205 MG/DL
CO2 SERPL-SCNC: 29 MMOL/L (ref 20–32)
CREAT SERPL-MCNC: 0.67 MG/DL (ref 0.52–1.04)
GFR SERPL CREATININE-BSD FRML MDRD: 86 ML/MIN/1.7M2
GLUCOSE SERPL-MCNC: 186 MG/DL (ref 70–99)
HBA1C MFR BLD: 8.3 % (ref 0–5.6)
HDLC SERPL-MCNC: 57 MG/DL
LDLC SERPL CALC-MCNC: 123 MG/DL
NONHDLC SERPL-MCNC: 148 MG/DL
POTASSIUM SERPL-SCNC: 4.5 MMOL/L (ref 3.4–5.3)
PROT SERPL-MCNC: 7.2 G/DL (ref 6.8–8.8)
PTH-INTACT SERPL-MCNC: 107 PG/ML (ref 18–80)
SODIUM SERPL-SCNC: 138 MMOL/L (ref 133–144)
T4 FREE SERPL-MCNC: 1.06 NG/DL (ref 0.76–1.46)
TRIGL SERPL-MCNC: 124 MG/DL
TSH SERPL DL<=0.005 MIU/L-ACNC: 1.16 MU/L (ref 0.4–4)

## 2018-08-10 PROCEDURE — 84443 ASSAY THYROID STIM HORMONE: CPT | Performed by: INTERNAL MEDICINE

## 2018-08-10 PROCEDURE — 83970 ASSAY OF PARATHORMONE: CPT | Performed by: INTERNAL MEDICINE

## 2018-08-10 PROCEDURE — 82306 VITAMIN D 25 HYDROXY: CPT | Performed by: INTERNAL MEDICINE

## 2018-08-10 PROCEDURE — 80076 HEPATIC FUNCTION PANEL: CPT | Performed by: INTERNAL MEDICINE

## 2018-08-10 PROCEDURE — 80061 LIPID PANEL: CPT | Performed by: INTERNAL MEDICINE

## 2018-08-10 PROCEDURE — 80048 BASIC METABOLIC PNL TOTAL CA: CPT | Performed by: INTERNAL MEDICINE

## 2018-08-10 PROCEDURE — 36415 COLL VENOUS BLD VENIPUNCTURE: CPT | Performed by: INTERNAL MEDICINE

## 2018-08-10 PROCEDURE — 83036 HEMOGLOBIN GLYCOSYLATED A1C: CPT | Performed by: INTERNAL MEDICINE

## 2018-08-10 PROCEDURE — 84439 ASSAY OF FREE THYROXINE: CPT | Performed by: INTERNAL MEDICINE

## 2018-08-11 LAB — DEPRECATED CALCIDIOL+CALCIFEROL SERPL-MC: 53 UG/L (ref 20–75)

## 2018-09-12 ENCOUNTER — OFFICE VISIT (OUTPATIENT)
Dept: PEDIATRICS | Facility: CLINIC | Age: 73
End: 2018-09-12
Payer: COMMERCIAL

## 2018-09-12 VITALS
DIASTOLIC BLOOD PRESSURE: 68 MMHG | BODY MASS INDEX: 35.42 KG/M2 | SYSTOLIC BLOOD PRESSURE: 132 MMHG | HEART RATE: 82 BPM | WEIGHT: 199.9 LBS | TEMPERATURE: 98.2 F | HEIGHT: 63 IN | OXYGEN SATURATION: 98 %

## 2018-09-12 DIAGNOSIS — G47.33 OBSTRUCTIVE SLEEP APNEA SYNDROME: ICD-10-CM

## 2018-09-12 DIAGNOSIS — E03.9 HYPOTHYROIDISM, UNSPECIFIED TYPE: ICD-10-CM

## 2018-09-12 DIAGNOSIS — E11.9 TYPE 2 DIABETES MELLITUS WITHOUT COMPLICATION, WITHOUT LONG-TERM CURRENT USE OF INSULIN (H): Primary | ICD-10-CM

## 2018-09-12 DIAGNOSIS — E78.2 MIXED HYPERLIPIDEMIA: ICD-10-CM

## 2018-09-12 DIAGNOSIS — J45.20 MILD INTERMITTENT ASTHMA, UNSPECIFIED WHETHER COMPLICATED: ICD-10-CM

## 2018-09-12 DIAGNOSIS — E21.3 HYPERPARATHYROIDISM (H): ICD-10-CM

## 2018-09-12 DIAGNOSIS — L30.9 ECZEMA, UNSPECIFIED TYPE: ICD-10-CM

## 2018-09-12 DIAGNOSIS — E66.01 MORBID OBESITY, UNSPECIFIED OBESITY TYPE (H): ICD-10-CM

## 2018-09-12 DIAGNOSIS — E03.8 OTHER SPECIFIED HYPOTHYROIDISM: ICD-10-CM

## 2018-09-12 DIAGNOSIS — H53.9 VISION CHANGES: ICD-10-CM

## 2018-09-12 PROCEDURE — 99207 C FOOT EXAM  NO CHARGE: CPT | Performed by: PEDIATRICS

## 2018-09-12 PROCEDURE — 99214 OFFICE O/P EST MOD 30 MIN: CPT | Performed by: PEDIATRICS

## 2018-09-12 RX ORDER — LEVOTHYROXINE SODIUM 112 UG/1
112 TABLET ORAL DAILY
Qty: 90 TABLET | Refills: 3 | Status: SHIPPED | OUTPATIENT
Start: 2018-09-12 | End: 2019-09-19

## 2018-09-12 RX ORDER — ATORVASTATIN CALCIUM 20 MG/1
80 TABLET, FILM COATED ORAL DAILY
Qty: 90 TABLET | Refills: 1 | Status: CANCELLED | OUTPATIENT
Start: 2018-09-12

## 2018-09-12 RX ORDER — ALBUTEROL SULFATE 0.83 MG/ML
2.5 SOLUTION RESPIRATORY (INHALATION) EVERY 6 HOURS PRN
Qty: 360 ML | Refills: 3 | Status: SHIPPED | OUTPATIENT
Start: 2018-09-12 | End: 2021-01-21

## 2018-09-12 RX ORDER — GLYBURIDE 2.5 MG/1
2.5 TABLET ORAL 2 TIMES DAILY WITH MEALS
Qty: 270 TABLET | Refills: 3 | Status: SHIPPED | OUTPATIENT
Start: 2018-09-12 | End: 2019-04-30

## 2018-09-12 NOTE — LETTER
My Asthma Action Plan  Name: Margaret Ramos   YOB: 1945  Date: 9/13/2018   My doctor: Jossie Luciano MD   My clinic: [facility]        My Control Medicine: None  My Rescue Medicine: Albuterol nebulizer solution q 4 hours   My Asthma Severity: intermittent  Avoid your asthma triggers: humidity, strong odors and fumes and cold air               GREEN ZONE   Good Control    I feel good    No cough or wheeze    Can work, sleep and play without asthma symptoms       Take your asthma control medicine every day.     1. If exercise triggers your asthma, take your rescue medication    15 minutes before exercise or sports, and    During exercise if you have asthma symptoms  2. Spacer to use with inhaler: If you have a spacer, make sure to use it with your inhaler             YELLOW ZONE Getting Worse  I have ANY of these:    I do not feel good    Cough or wheeze    Chest feels tight    Wake up at night   1. Keep taking your Green Zone medications  2. Start taking your rescue medicine:    every 20 minutes for up to 1 hour. Then every 4 hours for 24-48 hours.  3. If you stay in the Yellow Zone for more than 12-24 hours, contact your doctor.  4. If you do not return to the Green Zone in 12-24 hours or you get worse, start taking your oral steroid medicine if prescribed by your provider.           RED ZONE Medical Alert - Get Help  I have ANY of these:    I feel awful    Medicine is not helping    Breathing getting harder    Trouble walking or talking    Nose opens wide to breathe       1. Take your rescue medicine NOW  2. If your provider has prescribed an oral steroid medicine, start taking it NOW  3. Call your doctor NOW  4. If you are still in the Red Zone after 20 minutes and you have not reached your doctor:    Take your rescue medicine again and    Call 911 or go to the emergency room right away    See your regular doctor within 2 weeks of an Emergency Room or Urgent Care visit for follow-up  treatment.          Annual Reminders:  Meet with Asthma Educator,  Flu Shot in the Fall, consider Pneumonia Vaccination for patients with asthma (aged 19 and older).    Pharmacy:    [id number] - [place] - [address] AT [facility] [address]  [facility] - [place], [place] - [address]  WRITTEN PRESCRIPTION REQUESTED                      Asthma Triggers  How To Control Things That Make Your Asthma Worse    Triggers are things that make your asthma worse.  Look at the list below to help you find your triggers and what you can do about them.  You can help prevent asthma flare-ups by staying away from your triggers.      Trigger                                                          What you can do   Cigarette Smoke  Tobacco smoke can make asthma worse. Do not allow smoking in your home, car or around you.  Be sure no one smokes at a child s day care or school.  If you smoke, ask your health care provider for ways to help you quit.  Ask family members to quit too.  Ask your health care provider for a referral to Quit Plan to help you quit smoking, or call 2-740-457-PLAN.     Colds, Flu, Bronchitis  These are common triggers of asthma. Wash your hands often.  Don t touch your eyes, nose or mouth.  Get a flu shot every year.     Dust Mites  These are tiny bugs that live in cloth or carpet. They are too small to see. Wash sheets and blankets in hot water every week.   Encase pillows and mattress in dust mite proof covers.  Avoid having carpet if you can. If you have carpet, vacuum weekly.   Use a dust mask and HEPA vacuum.   Pollen and Outdoor Mold  Some people are allergic to trees, grass, or weed pollen, or molds. Try to keep your windows closed.  Limit time out doors when pollen count is high.   Ask you health care provider about taking medicine during allergy season.     Animal Dander  Some people are allergic to skin flakes, urine or saliva from pets with fur or  feathers. Keep pets with fur or feathers out of your home.    If you can t keep the pet outdoors, then keep the pet out of your bedroom.  Keep the bedroom door closed.  Keep pets off cloth furniture and away from stuffed toys.     Mice, Rats, and Cockroaches  Some people are allergic to the waste from these pests.   Cover food and garbage.  Clean up spills and food crumbs.  Store grease in the refrigerator.   Keep food out of the bedroom.   Indoor Mold  This can be a trigger if your home has high moisture. Fix leaking faucets, pipes, or other sources of water.   Clean moldy surfaces.  Dehumidify basement if it is damp and smelly.   Smoke, Strong Odors, and Sprays  These can reduce air quality. Stay away from strong odors and sprays, such as perfume, powder, hair spray, paints, smoke incense, paint, cleaning products, candles and new carpet.   Exercise or Sports  Some people with asthma have this trigger. Be active!  Ask your doctor about taking medicine before sports or exercise to prevent symptoms.    Warm up for 5-10 minutes before and after sports or exercise.     Other Triggers of Asthma  Cold air:  Cover your nose and mouth with a scarf.  Sometimes laughing or crying can be a trigger.  Some medicines and food can trigger asthma.

## 2018-09-12 NOTE — MR AVS SNAPSHOT
After Visit Summary   9/12/2018    Margaret Ramos    MRN: [id number]           Patient Information     Date Of Birth          1945        Visit Information        Provider Department      9/12/2018 1:40 PM Jossie Luciano MD [facility]        Today's Diagnoses     Mild intermittent asthma, unspecified whether complicated    -  1    Routine general medical examination at a health care facility        Type 2 diabetes mellitus without complication, without long-term current use of insulin (H)        Other specified hypothyroidism        Mixed hyperlipidemia (DYSLIPIDEMIA)        Vision changes        Eczema, unspecified type          Care Instructions    Think about steroid ointment for your hand    Visit with Dr Shannon for endocrinology to talk about your parathyroid    Keep up your CPAP - ok to use steroid ointment on your face     Keep your visit with Dr Meraz    Most important homework is to find a way to be active - exercise - even walking in place    Expect a phone call to schedule a visit with our clinical pharmacist, Kim Winkler              Follow-ups after your visit        Additional Services     MED THERAPY MANAGE REFERRAL       Your provider has referred you to: **[facility] Scheduling (numerous locations) [phone]   [email or web address]  [facility]: [facility] [place] [phone]   [email or web address]    Reason for Referral: multiple medications and supplements    The [place] [facility] department will contact you to schedule an appointment.  You may also schedule the appointment by calling [phone].  For [place] patients, please call [phone] to confirm/schedule your appointment on the next business day.    This service is designed to help you get the most from your medications.  A specially  trained Pharmacist will work closely with you and your providers to solve any questions, concerns, issues or problems related to your medications.    Please bring all of your prescription and non-prescription medications (such as vitamins, over-the-counter medications, and herbals) or a detailed medication list to your appointment.    If you have a glucose meter or other home monitoring information, please also bring this to your appointment (i.e. blood glucose log, blood pressure log, pain log, etc.).            OPHTHALMOLOGY ADULT REFERRAL       Your provider has referred you to: [facility]: [facility] [place]IGUEL [place] [phone]   [email or web address]    Please be aware that coverage of these services is subject to the terms and limitations of your health insurance plan.  Call member services at your health plan with any benefit or coverage questions.      Please bring the following with you to your appointment:    (1) Any X-Rays, CTs or MRIs which have been performed.  Contact the facility where they were done to arrange for  prior to your scheduled appointment.    (2) List of current medications  (3) This referral request   (4) Any documents/labs given to you for this referral                  Follow-up notes from your care team     Return in about 6 months (around 3/12/2019) for diabetes visit.      Your next 10 appointments already scheduled     Oct 02, 2018  2:30 PM CDT   Return Visit with MD Rebeka Kruseview Chanda Tom ([facility])    [address] [place] [address]  [address]  [facility] [address] [phone]              Who to contact     If you have questions or need follow up information about today's clinic visit or your schedule please contact [place] [facility] [place] directly at [phone].  Normal or non-critical lab and imaging results will be communicated to you by MyChart, letter or phone within 4 business days after the clinic has received the  "results. If you do not hear from us within 7 days, please contact the clinic through [employer] or phone. If you have a critical or abnormal lab result, we will notify you by phone as soon as possible.  Submit refill requests through [employer] or call your pharmacy and they will forward the refill request to us. Please allow 3 business days for your refill to be completed.          Additional Information About Your Visit        Care EveryWhere ID     This is your Care EveryWhere ID. This could be used by other organizations to access your [facility] medical records  [id number]        Your Vitals Were     Pulse Temperature Height Pulse Oximetry BMI (Body Mass Index)       82 98.2  F (36.8  C) (Tympanic) 5' 2.75\" (1.594 m) 98% 35.69 kg/m2        Blood Pressure from Last 3 Encounters:   09/12/18 132/68   01/04/18 130/64   09/21/17 134/62    Weight from Last 3 Encounters:   09/12/18 199 lb 14.4 oz (90.7 kg)   01/04/18 200 lb 9.6 oz (91 kg)   09/21/17 202 lb 1.6 oz (91.7 kg)              We Performed the Following     MED THERAPY MANAGE REFERRAL     OPHTHALMOLOGY ADULT REFERRAL          Today's Medication Changes          These changes are accurate as of 9/12/18  2:38 PM.  If you have any questions, ask your nurse or doctor.               These medicines have changed or have updated prescriptions.        Dose/Directions    atorvastatin 20 MG tablet   Commonly known as:  LIPITOR   This may have changed:  how much to take   Used for:  Routine general medical examination at a health care facility, Type 2 diabetes mellitus without complication, without long-term current use of insulin (H)        Dose:  80 mg   Take 4 tablets (80 mg) by mouth daily   Quantity:  90 tablet   Refills:  1            Where to get your medicines      Some of these will need a paper prescription and others can be bought over the counter.  Ask your nurse if you have questions.     Bring a paper prescription for each of these medications     albuterol (2.5 " MG/3ML) 0.083% neb solution    blood glucose monitoring test strip    glyBURIDE 2.5 MG tablet    levothyroxine 112 MCG tablet    metFORMIN 500 MG tablet                Primary Care Provider Office Phone # Fax #    Jossie Luciano [phone] [address] [place] [address]        Equal Access to Services     [facility]: Hadii aad ku hadasho Soomaali, waaxda luqadaha, qaybta kaalmada adeegyada, waxblair prietoin hayaan esther bhardwajspenserregina palafox . So [facility] [phone].    ATENCIÓN: Si habla español, tiene a delgado disposición servicios gratuitos de asistencia lingüística. [facility] [phone].    We comply with applicable federal civil rights laws and [place] laws. We do not discriminate on the basis of race, color, national origin, age, disability, sex, sexual orientation, or gender identity.            Thank you!     Thank you for choosing [facility]  for your care. Our goal is always to provide you with excellent care. Hearing back from our patients is one way we can continue to improve our services. Please take a few minutes to complete the written survey that you may receive in the mail after your visit with us. Thank you!             Your Updated Medication List - Protect others around you: Learn how to safely use, store and throw away your medicines at www.disposemymeds.org.          This list is accurate as of 9/12/18  2:38 PM.  Always use your most recent med list.                   Brand Name Dispense Instructions for use Diagnosis    ACE NOT PRESCRIBED (INTENTIONAL)      ACE Inhibitor not prescribed due to Refusal by patient        ACE/ARB/ARNI NOT PRESCRIBED (INTENTIONAL)      ACE & ARB not prescribed due to Symptomatic hypotension not due to excessive diuresis    Type 2 diabetes mellitus without complication, without long-term current use of insulin (H)       albuterol (2.5 MG/3ML) 0.083% neb solution     360 mL    Take 1 vial (2.5 mg) by nebulization every 6  hours as needed for shortness of breath / dyspnea or wheezing    Mild intermittent asthma, unspecified whether complicated       ascorbic acid 500 MG tablet    VITAMIN C     Take 1 tablet by mouth        aspirin 81 MG EC tablet     90 tablet    Take 1 tablet (81 mg) by mouth daily    Diabetes mellitus, type 2 (H)       atorvastatin 20 MG tablet    LIPITOR    90 tablet    Take 4 tablets (80 mg) by mouth daily    Routine general medical examination at a health care facility, Type 2 diabetes mellitus without complication, without long-term current use of insulin (H)       blood glucose monitoring lancets     400 each    Use as directed upto 6 times daily    Other specified hypothyroidism, Type 2 diabetes mellitus without complication, without long-term current use of insulin (H), Mixed hyperlipidemia       blood glucose monitoring test strip    ACCU-CHEK JINA PLUS    400 each    Use as directed up to 6 times daily    Other specified hypothyroidism, Mixed hyperlipidemia, Type 2 diabetes mellitus without complication, without long-term current use of insulin (H)       CALCIUM PO           CENTRUM SILVER per tablet      daily (every 24 hours).        CoQ-10 200 MG Caps      Take 200 mg by mouth daily        glyBURIDE 2.5 MG tablet    DIABETA /MICRONASE    270 tablet    Take 1 tablet (2.5 mg) by mouth 2 times daily (with meals)    Type 2 diabetes mellitus without complication, without long-term current use of insulin (H)       ketoconazole 2 % cream    NIZORAL    60 g    Apply topically 2 times daily    Type 2 diabetes mellitus with hyperglycemia (H)       levothyroxine 112 MCG tablet    SYNTHROID/LEVOTHROID    90 tablet    Take 1 tablet (112 mcg) by mouth daily Take 1/2 tab on Sundays    Other specified hypothyroidism, Mixed hyperlipidemia       magnesium sulfate 500 mg/mL Soln      Take 300 mg by mouth        metFORMIN 500 MG tablet    GLUCOPHAGE    360 tablet    Take 1 tablet (500 mg) by mouth 3 times daily (with  meals) Take 1 and 1/2 tab qam, 1/2 tab q lunch and 1/2 tab q pm    Type 2 diabetes mellitus without complication, without long-term current use of insulin (H)       metoprolol succinate 25 MG 24 hr tablet    TOPROL-XL     Take 0.5 tablets (12.5 mg) by mouth daily        VITAMIN D3 PO      Take 2,000 Units by mouth daily

## 2018-09-12 NOTE — PROGRESS NOTES
"  SUBJECTIVE:   Margaret Ramos is a 73 year old female who presents to clinic today for the following health issues:    Multiple concerns today    Lab results:  Patient would like to review cholesterol levels     Derm:    Patient has noticed increase in eczema, currently painful spot on right palm. Also has rash located on back, small red spots - tends to pick at them.     HPI:    Wants form completed for power company listing her needs for CPAP and nebulizer.    Diabetes - recent A1C reviewed - higher than usual at 8.3%.    Wants to continue current medications.  Attributes increase to inactivity over the winter months.    Sees Dr Meraz for cardiology and has a scheduled visit in 2 weeks to review ASCVD.  No new cardiac concerns.    On betablocker and statin, but has declined ACEI in the past.  On ASA.  Very limited recent physical activity.    Hyperlipidemia - now back on daily statin - had been taking on a more regular basis since getting back lipid panel results with increased levels compared to previous.    Concerns about increased vision changes - sees specific shapes in the periphery - needs ophthalmology follow up.    Hypothyroidism - TSH normal on recent labs.  No new symptoms of low or high thyroid.    Has itchy, thickening rash on palms.  Hesitant to use steroid ointment.    Asthma - has been under good control recently.  No new respiratory symptoms.    JASMINE - on CPAP    On multiple supplements that she would like to review in detail.    Problem list and histories reviewed & adjusted, as indicated.  Additional history: as documented      Reviewed and updated as needed this visit by clinical staff  Tobacco  Allergies  Meds  Med Hx  Surg Hx  Fam Hx  Soc Hx      Reviewed and updated as needed this visit by Provider             OBJECTIVE:     /68 (BP Location: Right arm, Patient Position: Right side, Cuff Size: Adult Large)  Pulse 82  Temp 98.2  F (36.8  C) (Tympanic)  Ht 5' 2.75\" (1.594 m)  Wt " 199 lb 14.4 oz (90.7 kg)  SpO2 98%  BMI 35.69 kg/m2  Body mass index is 35.69 kg/(m^2).  GENERAL: healthy, alert and no distress  CV: regular rates and rhythm, normal S1 S2, no S3 or S4, no murmur, click or rub and peripheral pulses strong  Diabetic foot exam: normal DP and PT pulses, no trophic changes or ulcerative lesions and normal sensory exam    Diagnostic Test Results:  reviewed    ASSESSMENT/PLAN:         ICD-10-CM    1. Type 2 diabetes mellitus without complication, without long-term current use of insulin (H) E11.9 glyBURIDE (DIABETA /MICRONASE) 2.5 MG tablet     blood glucose monitoring (ACCU-CHEK JINA PLUS) test strip     metFORMIN (GLUCOPHAGE) 500 MG tablet       Hyperparathyroidism - E21.1  FOOT EXAM    Control worsening, patient hesitant to change medications.   Discussed need for lifestyle changes.  Follow unit(s) in 3 months.  Has been following with endocrinology - recommended follow up with Dr Shannon for parathyroid disease.    blood glucose monitoring (ACCU-CHEK JINA PLUS) test strip   2. Other specified hypothyroidism E03.8      levothyroxine (SYNTHROID/LEVOTHROID) 112 MCG tablet   3. Mixed hyperlipidemia (DYSLIPIDEMIA) E78.2 Continue atorvastatin - patient wishes to review this with Dr Meraz        4. Mild intermittent asthma, unspecified whether complicated J45.20 albuterol (2.5 MG/3ML) 0.083% neb solution  Well controlled, continue current medications    Recommended flu and pneumonia shots - patient declines at this time     5. Vision changes H53.9 OPHTHALMOLOGY ADULT REFERRAL   6. Eczema, unspecified type L30.9 Recommended steroid cream - patient not interested at this time   7. Morbid obesity, unspecified obesity type (H) E66.01 Encouraged increased activity   8. Obstructive sleep apnea syndrome G47.33 Continue CPAP       Patient Instructions   Think about steroid ointment for your hand    Visit with Dr Shannon for endocrinology to talk about your parathyroid    Keep up your CPAP -  ok to use steroid ointment on your face     Keep your visit with Dr Meraz    Most important homework is to find a way to be active - exercise - even walking in place    Expect a phone call to schedule a visit with our clinical pharmacist, Kim Luciano MD  [facility] [place]

## 2018-09-26 ENCOUNTER — OFFICE VISIT (OUTPATIENT)
Dept: PHARMACY | Facility: CLINIC | Age: 73
End: 2018-09-26
Payer: COMMERCIAL

## 2018-09-26 VITALS — HEART RATE: 86 BPM | DIASTOLIC BLOOD PRESSURE: 80 MMHG | SYSTOLIC BLOOD PRESSURE: 149 MMHG

## 2018-09-26 DIAGNOSIS — E11.65 TYPE 2 DIABETES MELLITUS WITH HYPERGLYCEMIA, WITHOUT LONG-TERM CURRENT USE OF INSULIN (H): ICD-10-CM

## 2018-09-26 DIAGNOSIS — R03.0 ELEVATED BLOOD PRESSURE READING WITHOUT DIAGNOSIS OF HYPERTENSION: ICD-10-CM

## 2018-09-26 DIAGNOSIS — M81.0 OSTEOPOROSIS, UNSPECIFIED OSTEOPOROSIS TYPE, UNSPECIFIED PATHOLOGICAL FRACTURE PRESENCE: ICD-10-CM

## 2018-09-26 DIAGNOSIS — E78.2 MIXED HYPERLIPIDEMIA: ICD-10-CM

## 2018-09-26 DIAGNOSIS — I25.10 ASCVD (ARTERIOSCLEROTIC CARDIOVASCULAR DISEASE): Primary | ICD-10-CM

## 2018-09-26 DIAGNOSIS — Z71.89 ENCOUNTER FOR HERB AND VITAMIN SUPPLEMENT MANAGEMENT: ICD-10-CM

## 2018-09-26 DIAGNOSIS — Z71.85 VACCINE COUNSELING: ICD-10-CM

## 2018-09-26 DIAGNOSIS — J45.20 MILD INTERMITTENT ASTHMA, UNSPECIFIED WHETHER COMPLICATED: ICD-10-CM

## 2018-09-26 PROCEDURE — 99605 MTMS BY PHARM NP 15 MIN: CPT | Performed by: PHARMACIST

## 2018-09-26 PROCEDURE — 99607 MTMS BY PHARM ADDL 15 MIN: CPT | Performed by: PHARMACIST

## 2018-09-26 RX ORDER — PERPHENAZINE/AMITRIPTYLINE HCL 2 MG-10 MG
1 TABLET ORAL DAILY
COMMUNITY

## 2018-09-26 RX ORDER — CARBOXYMETHYLCELLULOSE SODIUM 5 MG/ML
1 SOLUTION/ DROPS OPHTHALMIC DAILY PRN
COMMUNITY

## 2018-09-26 NOTE — LETTER
"     [facility]     Date: 2018    Margaret LEONARD Richard  [address] [place] [address]    Dear Ms. Ramos,    Thank you for talking with me on 18 about your health and medications. Medicare s MTM (Medication Therapy Management) program helps you understand your medications and use them safely.      This letter includes an action plan (Medication Action Plan) and medication list (Personal Medication List). The action plan has steps you should take to help you get the best results from your medications. The medication list will help you keep track of your medications and how to use them the right way.       Have your action plan and medication list with you when you talk with your doctors, pharmacists, and other healthcare providers in your care team.     Ask your doctors, pharmacists, and other healthcare providers to update the action plan and medication list at every visit.     Take your medication list with you if you go to the hospital or emergency room.     Give a copy of the action plan and medication list to your family or caregivers.     If you want to talk about this letter or any of the papers with it, please call   [phone].   We look forward to working with you, your doctors, and other healthcare providers to help you stay healthy.     Sincerely,    Kim Winkler      Enclosed: Medication Action Plan and Personal Medication List    MEDICATION ACTION PLAN FOR Margaret Ramos,  1945     This action plan will help you get the best results from your medications if you:   1. Read \"What we talked about.\"   2. Take the steps listed in the \"What I need to do\" boxes.   3. Fill in \"What I did and when I did it.\"   4. Fill in \"My follow-up plan\" and \"Questions I want to ask.\"     Have this action plan with you when you talk with your doctors, pharmacists, and other healthcare providers in your care team. Share this with your family or caregivers too.  DATE PREPARED: " 2018  What we talked about: Reducing the number of pills that you take                                                  What I need to do:  Stop   -Cinnamon supplement.  -Fatty Liver turmeric/curcumin  -Circulator and Vein Support Health  -Biotin  -Acetyl L-carnitine  -Vitamin C taking only for prevention colds What I did and when I did it:                                              What we talked about: Blood sugars                                                  What I need to do: Continuing the metformin, not able to increase the dose currently but can discuss options in the future if blood sugars continue to be elevated       What I did and when I did it:                                               What we talked about: Vaccinations                                                  What I need to do: We noted that you do not want any vaccinations       What I did and when I did it:                                               What we talked about: Osteoporosis                                                  What I need to do: Try to increase the calcium in your diet by about 300mg or we could consider increasing calcium citrate by 1 more tablet.       What I did and when I did it:                                                     My follow-up plan:                 Questions I want to ask:              If you have any questions about your action plan, call Kim Winkler, Phone: [phone] , Monday-Friday 8-4:30pm.           MEDICATION LIST FOR Margaret RamosCECILIA 1945     This medication list was made for you after we talked. We also used information from your doctor's chart.      Use blank rows to add new medications. Then fill in the dates you started using them.    Cross out medications when you no longer use them. Then write the date and why you stopped using them.    Ask your doctors, pharmacists, and other healthcare providers to update this list at every visit. Keep this list  up-to-date with:       Prescription medications    Over the counter drugs     Herbals    Vitamins    Minerals      If you go to the hospital or emergency room, take this list with you. Share this with your family or caregivers too.     DATE PREPARED: 9/27/2018  Allergies or side effects: Ceftin [cefuroxime]; Pipgeptw-fsvsjqw-ervjel [fluocinolone]; Codeine; Doxycycline; Erythromycin; Floxin [ofloxacin]; Influenza vac split [flu virus vaccine]; Neosporin [neomycin-polymyx-gramicid]; Penicillin g; Septra ds [sulfamethoxazole w/trimethoprim]; and Sulfa drugs     Medication:  ALBUTEROL SULFATE (2.5 MG/3ML) 0.083% IN NEBU      How I use it:  Take 1 vial (2.5 mg) by nebulization every 6 hours as needed for shortness of breath / dyspnea or wheezing      Why I use it: Mild intermittent asthma, unspecified whether complicated    Prescriber:  Jossie Luciano MD      Date I started using it:       Date I stopped using it:         Why I stopped using it:            Medication:  ASPIRIN 81 MG PO TBEC      How I use it:  Take 1 tablet (81 mg) by mouth daily      Why I use it: Diabetes mellitus, type 2 (H)    Prescriber:  Raine Xiao MD      Date I started using it:       Date I stopped using it:         Why I stopped using it:            Medication:  ASTAXANTHIN 4 MG PO CAPS      How I use it:  Take 1 tablet by mouth daily      Why I use it:  Supplement    Prescriber:  Patient Reported      Date I started using it:       Date I stopped using it:         Why I stopped using it:            Medication:  ATORVASTATIN CALCIUM PO      How I use it:  Take 40 mg by mouth daily      Why I use it:  Cbolesterol    Prescriber:  Patient Reported      Date I started using it:       Date I stopped using it:         Why I stopped using it:            Medication:  BENADRYL PO      How I use it:  Take 6.25 mg by mouth as needed      Why I use it:  Allergies    Prescriber:  Patient Reported      Date I started using it:       Date I stopped  using it:         Why I stopped using it:            Medication:  CALCIUM CITRATE-VITAMIN D 315-250 MG-UNIT PO TABS      How I use it:  Take 1 tablet by mouth daily      Why I use it:  Bone loss    Prescriber:  Patient Reported      Date I started using it:       Date I stopped using it:         Why I stopped using it:            Medication:  CALCIUM-MAGNESIUM 500-250 MG PO TABS      How I use it:  Take 1 tablet by mouth daily Calcium Magnesium Intensive Care: Each Tablet: 15 mg Vit C, 125 international units  VIt D, 250 mg Calcium carbonate, 100 mg Mg      Why I use it:  Bone loss    Prescriber:  Patient Reported      Date I started using it:       Date I stopped using it:         Why I stopped using it:            Medication:  CARBOXYMETHYLCELLULOSE SOD PF 0.5 % OP SOLN      How I use it:  1 drop daily as needed for dry eyes      Why I use it:  Dry eyes    Prescriber:  Patient Reported      Date I started using it:       Date I stopped using it:         Why I stopped using it:            Medication:  CENTRUM SILVER PO TABS      How I use it:  daily (every 24 hours).      Why I use it:  Supplement    Prescriber:  Patient Reported      Date I started using it:       Date I stopped using it:         Why I stopped using it:            Medication:  COQ-10 200 MG PO CAPS      How I use it:  Take 200 mg by mouth daily      Why I use it:  Supplement    Prescriber:  Patient Reported      Date I started using it:       Date I stopped using it:         Why I stopped using it:            Medication:  ESSENTIAL ONE DAILY MULTIVIT PO      How I use it:  Take 1 capsule by mouth daily      Why I use it: Supplement    Prescriber:  Patient Reported      Date I started using it:       Date I stopped using it:         Why I stopped using it:            Medication:  GLYBURIDE 2.5 MG PO TABS      How I use it:  Take 1 tablet (2.5 mg) by mouth 2 times daily (with meals)      Why I use it: Type 2 diabetes mellitus without complication,  without long-term current use of insulin (H)    Prescriber:  Jossie Luciano MD      Date I started using it:       Date I stopped using it:         Why I stopped using it:            Medication:  KETOCONAZOLE 2 % EX CREA      How I use it:  Apply topically 2 times daily      Why I use it: Type 2 diabetes mellitus with hyperglycemia (H)    Prescriber:  Raine Xiao MD      Date I started using it:       Date I stopped using it:         Why I stopped using it:            Medication:  LEVOTHYROXINE SODIUM 112 MCG PO TABS      How I use it:  Take 1 tablet (112 mcg) by mouth daily Take 1/2 tab on Sundays      Why I use it: Hypothyroidism, unspecified type    Prescriber:  Jossie Luciano MD      Date I started using it:       Date I stopped using it:         Why I stopped using it:            Medication:  LUTEIN-ZEAXANTHIN-BILBERRY PO      How I use it:  Take 1 capsule by mouth 2 times daily 10 mg lutein, 5 mg zeaxanthin, 2000 international, Vit A, 200 mg VIt C, 60 Vit E, Bioflavonoid complex, green tea, epigallocatechin gallate, grape seed, black elder extract      Why I use it:  Supplement    Prescriber:  Patient Reported      Date I started using it:       Date I stopped using it:         Why I stopped using it:            Medication:  MAGNESIUM MALATE PO      How I use it:  Take 0.5 tablets by mouth 2 times daily      Why I use it:  Supplement    Prescriber:  Patient Reported      Date I started using it:       Date I stopped using it:         Why I stopped using it:            Medication:  METFORMIN  MG PO TABS      How I use it:  Take 1 tablet (500 mg) by mouth 3 times daily (with meals) Take 1 and 1/2 tab qam, 1/2 tab q lunch and 1/2 tab q pm      Why I use it: Type 2 diabetes mellitus without complication, without long-term current use of insulin (H)    Prescriber:  Jossie Luciano MD      Date I started using it:       Date I stopped using it:         Why I stopped using it:             Medication:  METOPROLOL SUCCINATE ER 25 MG PO TB24      How I use it:  Take 0.5 tablets (12.5 mg) by mouth daily      Why I use it:  High Blood pressure/heart disease    Prescriber:  Enoc Price MD      Date I started using it:       Date I stopped using it:         Why I stopped using it:            Medication:  OMEGA ESSENTIALS BASIC PO      How I use it:  Take 1 capsule by mouth daily      Why I use it:  Supplement    Prescriber:  Patient Reported      Date I started using it:       Date I stopped using it:         Why I stopped using it:            Medication:  VITAMIN C 500 MG PO TABS      How I use it:  Take 1 tablet by mouth      Why I use it:  Supplement    Prescriber:  Patient Reported      Date I started using it:       Date I stopped using it:         Why I stopped using it:            Medication:  VITAMIN D3 PO      How I use it:  Take by mouth 2 times daily       Why I use it:  Supplement    Prescriber:  Patient Reported      Date I started using it:       Date I stopped using it:         Why I stopped using it:            Medication:  VITAMIN K PO      How I use it:  Take 300 mcg by mouth daily      Why I use it:  Supplement    Prescriber:  Patient Reported      Date I started using it:       Date I stopped using it:         Why I stopped using it:            Medication:         How I use it:         Why I use it:      Prescriber:         Date I started using it:       Date I stopped using it:         Why I stopped using it:            Medication:         How I use it:         Why I use it:      Prescriber:         Date I started using it:       Date I stopped using it:         Why I stopped using it:            Medication:         How I use it:         Why I use it:      Prescriber:         Date I started using it:       Date I stopped using it:         Why I stopped using it:              Other Information:     If you have any questions about your action plan, call [phone].    According  to the Paperwork Reduction Act of 1995, no persons are required to respond to a collection of information unless it displays a valid OMB control number. The valid OMB number for this information collection is 5418-5927. The time required to complete this information collection is estimated to average 40 minutes per response, including the time to review instructions, searching existing data resources, gather the data needed, and complete and review the information collection. If you have any comments concerning the accuracy of the time estimate(s) or suggestions for improving this form, please write to: CMS, Attn: SMITHA Reports Clearance Officer, [address] [place] [phone].

## 2018-09-26 NOTE — MR AVS SNAPSHOT
After Visit Summary   9/26/2018    Margaret Ramos    MRN: [id number]           Patient Information     Date Of Birth          1945        Visit Information        Provider Department      9/26/2018 1:30 PM Kim Winkler, [facility]        Care Instructions    Recommendations from today's MTM visit:                                                      1. Stop taking vitamin C until you start feeling the start of a cold.    2. Ask Heart Doctor about OMEGA-3/fish oil stopping.     3. Stop   -Cinnamon supplement.  -Fatty Liver turmeric/curcumin  -Circulator and Vein Support Health  -Biotin  -Acetyl L-carnitine    4. Can take Plant Stanol/Sterol    5.  Future things to consider/discuss:  Asthma, osteoporosis    6.  Try to increase your calcium in your diet    Next MTM/pharmacist visit: as needed    To schedule another MTM appointment, please call the clinic directly or you may call the MTM scheduling line at [phone] or toll-free at [phone].     My Clinical Pharmacist's contact information:                                                      It was a pleasure seeing you today!  Please feel free to contact me with any questions or concerns you have.      Kim Winkler, PharmD [facility]  Medication Therapy Management Practitioner   #[phone]    You may receive a survey about the MTM services you received.  I would appreciate your feedback to help me serve you better in the future. Please fill it out and return it when you can. Your comments will be anonymous.                  Follow-ups after your visit        Your next 10 appointments already scheduled     Oct 02, 2018  2:30 PM CDT   Return Visit with Rosario Shannon MD   [facility] ([facility])    [address] [place] [id number] [phone]              Who to contact     If you have questions or need follow up information about today's clinic visit  or your schedule please contact [facility] directly at [phone].  Normal or non-critical lab and imaging results will be communicated to you by MyChart, letter or phone within 4 business days after the clinic has received the results. If you do not hear from us within 7 days, please contact the clinic through MyChart or phone. If you have a critical or abnormal lab result, we will notify you by phone as soon as possible.  Submit refill requests through [employer] or call your pharmacy and they will forward the refill request to us. Please allow 3 business days for your refill to be completed.          Additional Information About Your Visit        Care EveryWhere ID     This is your Care EveryWhere ID. This could be used by other organizations to access your [place] medical records  [id number]        Your Vitals Were     Pulse                   86            Blood Pressure from Last 3 Encounters:   09/26/18 149/80   09/12/18 132/68   01/04/18 130/64    Weight from Last 3 Encounters:   09/12/18 199 lb 14.4 oz (90.7 kg)   01/04/18 200 lb 9.6 oz (91 kg)   09/21/17 202 lb 1.6 oz (91.7 kg)              Today, you had the following     No orders found for display         Today's Medication Changes          These changes are accurate as of 9/26/18  2:35 PM.  If you have any questions, ask your nurse or doctor.               These medicines have changed or have updated prescriptions.        Dose/Directions    ATORVASTATIN CALCIUM PO   This may have changed:  Another medication with the same name was removed. Continue taking this medication, and follow the directions you see here.        Dose:  40 mg   Take 40 mg by mouth daily   Refills:  0         Stop taking these medicines if you haven't already. Please contact your care team if you have questions.     CALCIUM PO           magnesium sulfate 500 mg/mL Soln                    Primary Care Provider Office Phone # Fax #    Jossie Luciano MD  [phone] [address] [place] [address]        Equal Access to Services     SHRUTHI BOATENG : Hadii aad ku hadleelaluli Aniyaali, wakaylanda jackmaralha, ronata kabrandenda nikeniaheber, mookie idain hayaaden danmonique narvaez laVanesaadam carpenter. So [facility] [phone].    ATENCIÓN: Si habla español, tiene a delgado disposición servicios gratuitos de asistencia lingüística. Llame al [phone].    We comply with applicable federal civil rights laws and [place] laws. We do not discriminate on the basis of race, color, national origin, age, disability, sex, sexual orientation, or gender identity.            Thank you!     Thank you for choosing [facility]  for your care. Our goal is always to provide you with excellent care. Hearing back from our patients is one way we can continue to improve our services. Please take a few minutes to complete the written survey that you may receive in the mail after your visit with us. Thank you!             Your Updated Medication List - Protect others around you: Learn how to safely use, store and throw away your medicines at www.disposemymeds.org.          This list is accurate as of 9/26/18  2:35 PM.  Always use your most recent med list.                   Brand Name Dispense Instructions for use Diagnosis    ACE NOT PRESCRIBED (INTENTIONAL)      ACE Inhibitor not prescribed due to Refusal by patient        ACE/ARB/ARNI NOT PRESCRIBED (INTENTIONAL)      ACE & ARB not prescribed due to Symptomatic hypotension not due to excessive diuresis    Type 2 diabetes mellitus without complication, without long-term current use of insulin (H)       albuterol (2.5 MG/3ML) 0.083% neb solution     360 mL    Take 1 vial (2.5 mg) by nebulization every 6 hours as needed for shortness of breath / dyspnea or wheezing    Mild intermittent asthma, unspecified whether complicated       ascorbic acid 500 MG tablet    VITAMIN C     Take 1 tablet by mouth        aspirin 81 MG EC tablet     90  tablet    Take 1 tablet (81 mg) by mouth daily    Diabetes mellitus, type 2 (H)       Astaxanthin 4 MG Caps      Take 1 tablet by mouth daily        ATORVASTATIN CALCIUM PO      Take 40 mg by mouth daily        BENADRYL PO      Take 6.25 mg by mouth as needed        blood glucose monitoring lancets     400 each    Use as directed upto 6 times daily    Other specified hypothyroidism, Type 2 diabetes mellitus without complication, without long-term current use of insulin (H), Mixed hyperlipidemia       blood glucose monitoring test strip    ACCU-CHEK JINA PLUS    400 each    Use as directed up to 6 times daily    Type 2 diabetes mellitus without complication, without long-term current use of insulin (H)       calcium citrate-vitamin D 315-250 MG-UNIT Tabs per tablet    CITRACAL     Take 1 tablet by mouth daily        calcium-magnesium 500-250 MG Tabs per tablet    CALMAG     Take 1 tablet by mouth daily Calcium Magnesium Intensive Care: Each Tablet: 15 mg Vit C, 125 international units  VIt D, 250 mg Calcium carbonate, 100 mg Mg        Carboxymethylcellulose Sod PF 0.5 % Soln ophthalmic solution    REFRESH PLUS     1 drop daily as needed for dry eyes        CENTRUM SILVER per tablet      daily (every 24 hours).        CoQ-10 200 MG Caps      Take 200 mg by mouth daily        ESSENTIAL ONE DAILY MULTIVIT PO      Take 1 capsule by mouth daily        glyBURIDE 2.5 MG tablet    DIABETA /MICRONASE    270 tablet    Take 1 tablet (2.5 mg) by mouth 2 times daily (with meals)    Type 2 diabetes mellitus without complication, without long-term current use of insulin (H)       ketoconazole 2 % cream    NIZORAL    60 g    Apply topically 2 times daily    Type 2 diabetes mellitus with hyperglycemia (H)       levothyroxine 112 MCG tablet    SYNTHROID/LEVOTHROID    90 tablet    Take 1 tablet (112 mcg) by mouth daily Take 1/2 tab on Sundays    Hypothyroidism, unspecified type       LUTEIN PLUS BILBERRY PO            LUTEIN-ZEAXANTHIN-BILBERRY PO      Take 1 capsule by mouth 2 times daily 10 mg lutein, 5 mg zeaxanthin, 2000 international, Vit A, 200 mg VIt C, 60 Vit E, Bioflavonoid complex, green tea, epigallocatechin gallate, grape seed, black elder extract        MAGNESIUM MALATE PO      Take 0.5 tablets by mouth 2 times daily        metFORMIN 500 MG tablet    GLUCOPHAGE    360 tablet    Take 1 tablet (500 mg) by mouth 3 times daily (with meals) Take 1 and 1/2 tab qam, 1/2 tab q lunch and 1/2 tab q pm    Type 2 diabetes mellitus without complication, without long-term current use of insulin (H)       metoprolol succinate 25 MG 24 hr tablet    TOPROL-XL     Take 0.5 tablets (12.5 mg) by mouth daily        OMEGA ESSENTIALS BASIC PO      Take 1 capsule by mouth daily        VITAMIN D3 PO      Take by mouth 2 times daily        VITAMIN K PO      Take 300 mcg by mouth daily

## 2018-09-26 NOTE — PATIENT INSTRUCTIONS
Recommendations from today's MTM visit:                                                      1. Stop taking vitamin C until you start feeling the start of a cold.    2. Ask Heart Doctor about OMEGA-3/fish oil stopping.     3. Stop   -Cinnamon supplement.  -Fatty Liver turmeric/curcumin  -Circulator and Vein Support Health  -Biotin  -Acetyl L-carnitine    4. Can take Plant Stanol/Sterol    5.  Future things to consider/discuss:  Asthma, osteoporosis    6.  Try to increase your calcium in your diet    Next MTM/pharmacist visit: as needed    To schedule another MTM appointment, please call the clinic directly or you may call the MTM scheduling line at [phone] or toll-free at [phone].     My Clinical Pharmacist's contact information:                                                      It was a pleasure seeing you today!  Please feel free to contact me with any questions or concerns you have.      Kim Winkler, PharmD [facility]  Medication Therapy Management Practitioner   #[phone]    You may receive a survey about the MTM services you received.  I would appreciate your feedback to help me serve you better in the future. Please fill it out and return it when you can. Your comments will be anonymous.

## 2018-09-26 NOTE — PROGRESS NOTES
SUBJECTIVE/OBJECTIVE:                           Margaret Ramos is a 73 year old female coming in for an initial visit for Medication Therapy Management.  She was referred to me from Dr. Luciano.     Chief Complaint:  Pt is on a great deal of over the counter vitamins and would like to know if there are interactions with her prescriptions and which ones would be okay to stop.     Allergies/ADRs: Reviewed in Epic  Tobacco: No tobacco use  Alcohol: none  Caffeine: 2 cups/day of coffee  Activity: Pt has troubles breathing in high dew point conditions and doesn't like to go outside when it is chilly.  Pt states that she no longer has a dog to walk anymore. PMH: Reviewed in Epic    Medication Adherence/Access:  Patient uses pill box(es).  3 of them.  Patient takes medications 3 time(s) per day.  Medication barriers: feelings of burden/being overwhelmed.   The patient fills medications at [place]: NO    Diabetes:  Pt currently taking Metformin on average 1250 mg per day (1.5 metformin in morning, 1/2 to 1 tablet at dinner, 0.5 tablets in evening snack)  She takes an extra 0.5 tablets when her blood sugars are running high. Pt states that if she takes it too soon before bed she has stomach problems.  Takes at least 5 hours before bed. Pt also takes Glyburide 2.5 mg twice a day.  SMBG: up to 6 times daily.     Symptoms of low blood sugar? none. Frequency of hypoglycemia? never.  Recent symptoms of high blood sugar? vision changes  Eye exam: due  Foot exam: due  Microalbumin is < 30 mg/g. Pt is not taking an ACEi/ARB.  Pt is overdue for updated microalbumin levels.  Aspirin: Taking 81mg daily and denies side effects  Diet/Exercise: Pt doesn't have a normal meal plan.  Big brunch and snacks throughout the day. Main meal is breakfast. Not much dinner.  More snacking throughout the day.     Breakfast: 1 glass of milk each morning,  1/4 cup of cheese with omlete.  Asparagus, 1/2 onion, 3 gloves garlic.  1 cup milk.    Pt doesn't like  to eat vegetables.  Pt says that she is very afraid of saturated fats and avoids going out to eat because she doesn't know what to order.    Hypertension/ASCVD: Current medications include metoprolol 12.5 mg daily and aspirin 81 mg daily.  Patient does self-monitor BP. Home BP monitoring in range of under 120's systolic over 50's and 60's diastolic.  Patient reports metoprolol is causing her a running nose.  Pt was initially recommended metoprolol 25 mg, but she told her physician that she is too sensitive and that dose would be too high.  Pt states that she has white coat syndrome and always has high blood pressure in the clinic. Pt states that this is why she tests at home and has had her home monitor compared against the clinics which is comparable.     Supplements: Pt has had past success with supplements for her eye that she now self treats with supplements.  Pt is uncomfortable with stopping over the counter supplements. Pt reports reading labels to see what supplements would be used for and if listed qualities are what she is looking for she will take it.  Pt came in today with suitcase of over the counter products that had been ordered through various locations.   Eye vitamin-Lutein-Zeaxanthin-Bilberry 1 BID-she finds very helpful for floaters in her eye  Ultimate support 1 per day, for general health  Vitamin K2MK-7 100mg every day for heart disease protection  Acetyl L-carnitine 500mg every day for unknown reason  Biotin 5000mg every day-she feels she does not need for hair anymore  Circulation and Vein Support for Healthy Legs- see did not notice a difference in her legs since starting  Astaxanthin every day- general heart protection  Cinnamon 350mg every day for diabetes, she rather use dietary means  Phytoceramiles with Biotin 1 cap every day - started July and uncertain of effects  Fatty Liver Turmeric or Curcumin for prevention of liver disease  Plant Stanol/Sterol for Cholesterol, she has not started  this one and uncertain if she wants to  Vitamin C 500mg for general prevention of colds  Magnesium malate 1000mg every day for bones and cramping and heart disease  DHA supplement- 425mg EPA+DHA QD    Osteoporosis: Current therapy includes: Citracal 315mg/vitamin D 250mg 1 capsule per day, vitamin D 1000 units BID, vitamin D in Essential 1 (1000 international units every day and calcium-magnesium 1 tablet per day.  Pt is not experiencing side effects.  Pt is getting the following sources of dietary calcium: milk, cheese and green leafy vegetables  Last vitamin D level:   Component      Latest Ref Rng & Units 8/10/2018   Vitamin D Deficiency screening      20 - 75 ug/L 53     DEXA History: 9/18/2017  FINDINGS:               Lumbar Spine (L1-L4)      T-score:  -2.6               Left Femoral Neck                      T-score:  -2.7               Right Femoral Neck                    T-score:  -2.9               Forearm (radius 33%)      T-score:  -2.7                             Lumbar (L1-L4) BMD: 0.879            Previous: 1.010                                              Total Hip Mean BMD: 0.730            Previous: 0.867    Hyperlipidemia: Current therapy includes Atorvastatin 40 mg once daily and Co-Q10 200 mg daily..  Pt reports no significant myalgias or other side effects. Pt has prior hx of MI.  Pt is complaining that when she was initially placed on statin therapy her cholesterol levels increased.  Pt is very cautious about saturated fat intake within diet.     Asthma: Current therapy includes Albuterol Neb solution 1 vial every 6 hours as needed.  Pt states that she doesn't need it because she knows how to control her breathing.  She says step one is to use oral lozenges.  Step 2 is using steam to improve breathing.  If all else fails she will use her albuterol.  Pt states that the albuterol MDI makes her heart race.   ACT Total Scores 9/26/2018   ACT TOTAL SCORE (Goal Greater than or Equal to 20) 14   In  the past 12 months, how many times did you visit the emergency room for your asthma without being admitted to the hospital? 0   In the past 12 months, how many times were you hospitalized overnight because of your asthma? 0       Vaccines: Pt states that she has had multiple allergic reactions to flu shots in the past.    Current labs include:  BP Readings from Last 3 Encounters:   09/26/18 149/80   09/12/18 132/68   01/04/18 130/64     Wt Readings from Last 4 Encounters:   09/12/18 199 lb 14.4 oz (90.7 kg)   01/04/18 200 lb 9.6 oz (91 kg)   09/21/17 202 lb 1.6 oz (91.7 kg)   09/07/17 198 lb (89.8 kg)     Today's Vitals: /80  Pulse 86  Lab Results   Component Value Date    A1C 8.3 08/10/2018   .  Lab Results   Component Value Date    CHOL 205 08/10/2018     Lab Results   Component Value Date    TRIG 124 08/10/2018     Lab Results   Component Value Date    HDL 57 08/10/2018     Lab Results   Component Value Date     08/10/2018       Liver Function Studies -   Recent Labs   Lab Test  08/10/18   1331   PROTTOTAL  7.2   ALBUMIN  3.8   BILITOTAL  0.6   ALKPHOS  115   AST  22   ALT  36       Lab Results   Component Value Date    UCRR 45 11/29/2016    MICROL <5 11/29/2016    UMALCR Unable to calculate due to low value 11/29/2016       Last Basic Metabolic Panel:  Lab Results   Component Value Date     08/10/2018      Lab Results   Component Value Date    POTASSIUM 4.5 08/10/2018     Lab Results   Component Value Date    CHLORIDE 103 08/10/2018     Lab Results   Component Value Date    BUN 15 08/10/2018     Lab Results   Component Value Date    CR 0.67 08/10/2018     GFR Estimate   Date Value Ref Range Status   08/10/2018 86 >60 mL/min/1.7m2 Final     Comment:     Non  GFR Calc   08/22/2017 79 >60 mL/min/1.7m2 Final     Comment:     Non  GFR Calc   11/29/2016 78 >60 mL/min/1.7m2 Final     Comment:     Non  GFR Calc     GFR Estimate If Black   Date Value Ref  Range Status   08/10/2018 >90 >60 mL/min/1.7m2 Final     Comment:      GFR Calc   08/22/2017 >90 >60 mL/min/1.7m2 Final     Comment:      GFR Calc   11/29/2016 >90   GFR Calc   >60 mL/min/1.7m2 Final       Most Recent Immunizations   Administered Date(s) Administered     Tdap (Adult) Unspecified Formulation 12/18/2012       ASSESSMENT:                             Current medications were reviewed today.     Medication Adherence: good, no issues identified  Medicare Part D topics discussed:OTC products, Medication changes and Lifestyle changes    Diabetes: Needs Improvement. Patient is not meeting A1c goal of < 8%. Pt would benefit from Metformin :  increase dose to 1500 mg.   Due for annual eye exam. Pt would benefit from less snacking to help with glucose control.    Hypertension/ASCVD: Needs Improvement. Patient is not meeting BP goal of < 130/80mmHg.  Pt could benefit from bringing in home BP monitor to assess the accuracy of home monitor to identify if pt needs additional hypertension therapy.     Hyperlipidemia: Needs Improvment. Pt would benefit continuing to take on daily basis and rechecking in 2-3 months.  We discussed that she could use plant stanol/sterols to help reduce cholesterol, she was uncertain if she wanted to do this.    Supplements: Needs Improvement.  Pt would benefit from discontinuation of following medications: cinnamon supplement, fatty liver turmeric/curcumin, circulator and vein support health, biotin, and acetyl L-carnitine due to unnecessary treatment and cost. Additionally, pt could benefit from using vitamin C only when experiencing the start of a cold.     Asthma: Needs Improvement.  Due to time constraints we are unable to make recommendations at this time.  Pt would benefit from using albuterol neb if having troubles breathing.     Vaccines:  Pt would benefit from receiving Pneumococcal and Influenza vaccination.     Osteoporosis:  Needs Improvement. Pt is not meeting RDI of calcium 1200mg/day, she is at about 900mg from supplements and diet. Pt is exceeding RDI of Vitamin D 1000 IU/day. Vitamin D is at goal 30-75ng/dL. Pt would benefit from:increasing calcium, decreasing vitamin D and treatment.  She was not open to starting treatment, making changes today.  She does have endocrinology appointment 10/2/2018.    PLAN:                            Supplements:  1. Discontinue cinnamon supplement.  2. Discontinue fatty liver turmeric/curcumin supplement.  3. Discontinue vitamin C until start of a cold.   4. Discontinue circulator and vein support health.  5. Discontinue biotin.  6. Discontinue acetyl l-carnitine.  7.  Increase Ca intake within diet.   8.  Pt to ask cardiology about need for fish oil, vitamin K and astaxanthin for heart disease prevention.    Diabetes:  1.  Decrease daily snacking.  2.  Increase Metformin to 1500 mg per day.  Pt declined.     Vaccines:  1.  Pt to consider receiving Pneumococcal and Influenza vaccinations.  Pt declined.     Future things to consider/discuss:  Asthma control (due to low ACT score), osteoporosis (treatment and increasing calcium-pt refused today), and mood (due to high PHQ-2 score).    I spent 70 minutes with this patient today, extra 15 minutes spent creating MAP. All changes were made via collaborative practice agreement with Jossie Luciano. A copy of the visit note was provided to the patient's primary care provider.    Will follow up in 2-3 months, pt requested follow-up appt after meeting with cardiology and endocrinology.    The patient was given a summary of these recommendations as an after visit summary.     Franko Kelley Pharmacy Student  [employer]    Kim Winkler PharmD BCPS  Medication Therapy Management Practitioner   #[phone]

## 2018-09-27 ASSESSMENT — ASTHMA QUESTIONNAIRES: ACT_TOTALSCORE: 14

## 2018-10-02 ENCOUNTER — OFFICE VISIT (OUTPATIENT)
Dept: ENDOCRINOLOGY | Facility: CLINIC | Age: 73
End: 2018-10-02
Payer: COMMERCIAL

## 2018-10-02 VITALS
OXYGEN SATURATION: 100 % | WEIGHT: 200.7 LBS | HEART RATE: 76 BPM | BODY MASS INDEX: 35.56 KG/M2 | SYSTOLIC BLOOD PRESSURE: 136 MMHG | DIASTOLIC BLOOD PRESSURE: 60 MMHG | HEIGHT: 63 IN | TEMPERATURE: 97.7 F

## 2018-10-02 DIAGNOSIS — E11.65 TYPE 2 DIABETES MELLITUS WITH HYPERGLYCEMIA, WITHOUT LONG-TERM CURRENT USE OF INSULIN (H): Primary | ICD-10-CM

## 2018-10-02 DIAGNOSIS — E03.8 OTHER SPECIFIED HYPOTHYROIDISM: ICD-10-CM

## 2018-10-02 DIAGNOSIS — M85.80 OSTEOPENIA, UNSPECIFIED LOCATION: ICD-10-CM

## 2018-10-02 PROCEDURE — 99215 OFFICE O/P EST HI 40 MIN: CPT | Performed by: INTERNAL MEDICINE

## 2018-10-02 NOTE — PROGRESS NOTES
Name: Margaret Ramos  Seen for f/u. [facility]   HPI:  Margaret Ramos is a 73 year old female who presents for the evaluation/management of DM.  She typically goes to bed late at night around midnight-2:00 in the morning and wakes up around 10-11:00 in the morning.  Typically has breakfast around noon, afternoon snack of popcorn, supper and typically has bedtime snack every single night.   She tells me that she is not consistent with evening dose of glyburide and is missing metformin in afternoon.  She is worried about hypoglycemia.  He does not like to take medications.  Reports that she will take extra pill of metformin if the blood sugar is high at night but after she has to wake up at night with abdominal pain and cramping if she takes nighttime dose of metformin.  She is taking regular metformin and is resistant to switch to metformin extended release.    1. Type 2 DM:  Orginally diagnosed at the age of: 54 in 11/99. Initially on glucovance, then switched to metformin and glyburide due to formulary issues.  Current Regimen: Metformin 750 mg qam,metformin  qpm and glyburide 2.5 mg bid( not consistent)  BS checks: bid 202.  Limited blood sugar data available and  Average Meter Download: It is variable.  Blood sugars ranging from 141-334.  No major episodes of hypoglycemia noted or reported.    Complications:   Diabetes Complications  Description / Detail    Diabetic Retinopathy  No   CAD / PAD  Yes. S/p stent placement   Neuropathy  No   Nephropathy / Microalbuminuria  No   Gastroparesis  No   Hypoglycemia [place]  No     2. Hypertension: Blood Pressure today:   BP Readings from Last 3 Encounters:   10/02/18 136/60   09/26/18 149/80   09/12/18 132/68     Not on medication.  She refused medications in past.  3. Hyperlipidemia: On medication  PMH/PSH:  Past Medical History:   Diagnosis Date     ASCVD (arteriosclerotic cardiovascular disease) 8/10/2016     Chronic ischemic heart disease 6/13/2016      Hyperparathyroidism (H) 1/4/2018     Hypothyroidism 10/20/2014     Inferior MI (H) 8/10/2016    2016      Mixed hyperlipidemia (DYSLIPIDEMIA) 10/20/2014     Morbid obesity (H) 10/13/2015     S/P drug eluting coronary stent placement 8/10/2016    RCA 6/2016      Type 2 diabetes mellitus with hyperglycemia (H) 10/20/2014       Past Surgical History:   Procedure Laterality Date     APPENDECTOMY      age 7     BACK SURGERY  1978     CARPAL TUNNEL RELEASE RT/LT      bilateral     wisdom teeth extraction       Family Hx:  Family History   Problem Relation Age of Onset     Diabetes Mother      type 2     Diabetes Sister      both sisters have type 2     Coronary Artery Disease Sister      Glaucoma Maternal Aunt      Macular Degeneration No family hx of      Thyroid disease: No         DM2: Yes - sisters,mother         Autoimmune: DM1, SLE, RA, Vitiligo No    Social Hx:  Social History     Social History     Marital status: Single     Spouse name: N/A     Number of children: 0     Years of education: N/A     Occupational History     Not on file.     Social History Main Topics     Smoking status: Never Smoker     Smokeless tobacco: Never Used     Alcohol use No     Drug use: No     Sexual activity: Not Currently     Other Topics Concern     Parent/Sibling W/ Cabg, Mi Or Angioplasty Before 65f 55m? No     Social History Narrative          MEDICATIONS:  has a current medication list which includes the following prescription(s): ACE NOT PRESCRIBED, INTENTIONAL,, ACE/ARB NOT PRESCRIBED, INTENTIONAL,, albuterol, ascorbic acid, aspirin, astaxanthin, atorvastatin calcium, blood glucose monitoring, blood glucose monitoring, calcium citrate-vitamin d, calcium-magnesium, carboxymethylcellulose sod pf, cholecalciferol, coq-10, diphenhydramine hcl, glyburide, ketoconazole, levothyroxine, lutein-zeaxanthin-bilberry, magnesium malate, metformin, metoprolol succinate, multiple vitamins-calcium, centrum silver, omega-3 fatty acids, and  "vitamin k.    ROS     ROS: 10 point ROS neg other than the symptoms noted above in the HPI.    Physical Exam   VS: /60 (BP Location: Right arm, Patient Position: Chair, Cuff Size: Adult Large)  Pulse 76  Temp 97.7  F (36.5  C) (Oral)  Ht 1.594 m (5' 2.75\")  Wt 91 kg (200 lb 11.2 oz)  SpO2 100%  Breastfeeding? No  BMI 35.84 kg/m2  GENERAL: AXOX3, NAD, well dressed, answering questions appropriately, appears stated age.  HEENT: No exopthalmous, no proptosis, EOMI, no lig lag, no retraction  NECK: Thyroid normal in size, non tender, no nodules were palpated.  CV: RRR  LUNGS: CTAB  ABDOMEN: +BS  NEUROLOGY: CN grossly intact, no tremors  PSYCH: normal affect and mood      LABS:  Lab Results   Component Value Date    A1C 8.3 08/10/2018    A1C 7.7 12/28/2017    A1C 7.9 08/22/2017    A1C 7.9 11/29/2016    A1C 8.0 06/02/2016     Lab Results   Component Value Date    UMALCR Unable to calculate due to low value 11/29/2016         ENDO THYROID LABS-[facility] Latest Ref Rng & Units 8/10/2018 12/28/2017   TSH 0.40 - 4.00 mU/L 1.16 1.23   T4 FREE 0.76 - 1.46 ng/dL 1.06    FREE T3 2.3 - 4.2 pg/mL       ENDO THYROID LABS-[facility] Latest Ref Rng & Units 9/12/2017 11/29/2016   TSH 0.40 - 4.00 mU/L 0.86 0.76   T4 FREE 0.76 - 1.46 ng/dL 1.30 1.21   FREE T3 2.3 - 4.2 pg/mL 2.0 (L)      ENDO THYROID LABS-[facility] Latest Ref Rng & Units 12/3/2015   TSH 0.40 - 4.00 mU/L 0.34 (L)   T4 FREE 0.76 - 1.46 ng/dL 1.21   FREE T3 2.3 - 4.2 pg/mL      ENDO CALCIUM LABS-[facility] Latest Ref Rng & Units 8/10/2018   CALCIUM 8.5 - 10.1 mg/dL 9.8     ENDO CALCIUM LABS-[facility] Latest Ref Rng & Units 8/22/2017 11/29/2016   CALCIUM 8.5 - 10.1 mg/dL 10.1 10.0     ENDO CALCIUM LABS-[facility] Latest Ref Rng & Units 7/26/2016 7/14/2016   CALCIUM 8.5 - 10.1 mg/dL  10.6     ENDO CALCIUM LABS-[facility] Latest Ref Rng & Units 8/10/2018 12/28/2017   PARATHYROID HORMONE INTACT 18 - 80 pg/mL 107 (H) 87 (H)     ENDO CALCIUM LABS-[facility] Latest Ref Rng & Units 9/12/2017 8/22/2017   PARATHYROID " HORMONE INTACT 18 - 80 pg/mL  100 (H)     ENDO CALCIUM LABS-[facility] Latest Ref Rng & Units 11/29/2016   PARATHYROID HORMONE INTACT 18 - 80 pg/mL 100 (H)     Vitamin D Deficiency Screening Results:  Lab Results   Component Value Date    VITDT 53 08/10/2018    VITDT 42 12/28/2017    VITDT 48 09/12/2017    VITDT 41 11/29/2016    VITDT 36 12/08/2015       All pertinent notes, labs, and images personally reviewed by me.     A/P  Ms.Alice LEONARD Ramos is a 73 year old here for the evaluation/management of diabetes:    1. DM2 -under poor control. A1c 8.3%.  Diabetes complicated by CAD status post stent placement.  Dietary indiscretion playing a role in worsening control.  She is not consistent with medication and missing glyburide many times.  She is resistant to go up on metformin or trial of extended release metformin   plan:  Take metformin 1.5 mg (750 mg) in AM and 1.5 mg with popcorn.  Try to increase the dose to 1000 mg twice daily if tolerated.  Take glyburide 2.5 mg twice a day with meals.      Cut down on potatoes With breakfast  Decrease quantity of popcorn/afternoon snack  Be consistent with medications and meals.  Instead of a big breakfast try to have 2 small meals for 5 hours apart    Recommend checking blood sugars before meals and at bedtime.    If Blood glucose are low more often-> 2-3 times/week- give us a call.  The patient is advised to Make better food choices: reduce carbs, Reduce portion size, weight loss and exercise 3-4 times a week.  Discussed hypoglycemia signs and symptoms as well as management in detail.     2. Hypertension -under good control.  Previously ACE discussed, pt is reluctant to start.     3. Hyperlipidemia - Under Fair control.  Taking atorvastatin 40 mg.    4. Prevention  Dental-Yes  ASA-yes  Smoking- No    5. Hypothyroidism  On levoT 112 mcg qd  with 1/2 tab on Sundays.  Continue current dose of levothyroxine.  Recent labs in normal range.  Plan:  Continue levothyroxine at current dose 112  mcg/day for 6 days a week and 56 mcg/day for 1 day a week.  Clinically looks euthyroid.  Labs in 3-6 months    7.  Hyperparathyroidism :  Mildly elevated PTH at 100 c/w possible early primary hyperparathyroidism.  Calcium normal.  Vitamin D normal.  Kidney function normal  DEXA showing osteoporosis. Not on medication at this time.  Plan- continue to monitor at this time in the setting of stable PTH, normal calcium levels.    Repeat labs in 3-6 months.  Consider parathyroid scan based on that.  Continue vitamin D replacement.    8. Osteoporosis:  Discussed DEXA scan showing osteoporosis  Discussed treatment options including Fosamax.  At this time she would like to concentrate on calcium and vitamin D supplement and is not ready for medication  Risk of fractures and complications associated with osteoporosis was discussed with patient and she understands.  The pt was advised to    Maintain an adequate calcium and vitamin D intake and to supplement vitamin D if needed to maintain serum levels of 25 hydroxy D (25 OH D) between 30-60 ng/ml.    Limit alcohol intake to no more than 2 servings per day.    Limit caffeine intake.    Maintain an active lifestyle including weight-bearing exercises for at least 30 mins daily.    Take measures to reduce the risk of falling.      All questions were answered.  The patient indicates understanding of the above issues and agrees with the plan set forth.       More than 50% of face to face time spent with Ms. Ramos on counseling / coordinating her care.  Total face to face time was 45 minutes.      Follow-up:  follow up in 3 months    Rosario Buenrostro  CC: Jossie Luciano MD

## 2018-10-02 NOTE — MR AVS SNAPSHOT
After Visit Summary   10/2/2018    Margaret aRmos    MRN: [id number]           Patient Information     Date Of Birth          1945        Visit Information        Provider Department      10/2/2018 2:30 PM Rosario Shannon MD [facility]        Today's Diagnoses     Type 2 diabetes mellitus with hyperglycemia, without long-term current use of insulin (H)    -  1    Other specified hypothyroidism        Osteopenia, unspecified location          Care Instructions    [facility]   Dr Shannon, Endocrinology Department      [facility]   [address]  [place] [address]  Appointment [phone]  Fax: [phone]  [place]: Monday and Tuesday         [place] [address] [facility] [address]  [place] [address]  Appointment [phone]  Fax: [phone]  [place]: Wednesday and Thursday            Continue current regimen  Try to take metformin consistently  Cut down on carbs at night  Small 3- 4 meals/day  Labs in 3 months  Please make a lab appointment for blood work and follow up clinic appointment in 1 week after that to discuss results.    You should get 1200 mg/day calcium in divided doses of no more than 500 mg/dose.  This INCLUDES what is in your food as well as what is in any supplements you may be taking.    Vit D about 1000 IU/day ( unless you have vit D deficiency- in that case higher dose)  Dietary sources of calcium:: These also contain vitamin D  Milk                            8 oz            300 mg calcium  Yogurt                          1 cup           400 mg calcium   Hard cheese                     1.5 oz          300 mg  Cottage cheese                  2 cup           300 mg  Orange juice with Calcium       8 oz            300 mg  Low fat dairy sources are recommended      You should get 30 minutes of moderate weight bearing exercise on most  "days of the week .  Weight bearing exercise includes such things as walking, jogging, hiking, dancing.  You should also get Strength training 2 or more times/week in addition to other weight -being exercise. Strength training uses weight or resistance beyond that seen in everyday activities -(pilates, weight training with free weights, weight machines or resistance bands)                Follow-ups after your visit        Future tests that were ordered for you today     Open Future Orders        Priority Expected Expires Ordered    Hemoglobin A1c Routine  7/29/2019 10/2/2018    Magnesium Routine  7/29/2019 10/2/2018    Parathyroid Hormone Intact Routine  7/29/2019 10/2/2018    Phosphorus Routine  7/29/2019 10/2/2018    Vitamin D Deficiency Routine  7/29/2019 10/2/2018    Calcium Routine  7/29/2019 10/2/2018    Creatinine Routine  7/29/2019 10/2/2018            Who to contact     If you have questions or need follow up information about today's clinic visit or your schedule please contact [facility] directly at [phone].  Normal or non-critical lab and imaging results will be communicated to you by MyChart, letter or phone within 4 business days after the clinic has received the results. If you do not hear from us within 7 days, please contact the clinic through MyChart or phone. If you have a critical or abnormal lab result, we will notify you by phone as soon as possible.  Submit refill requests through [employer] or call your pharmacy and they will forward the refill request to us. Please allow 3 business days for your refill to be completed.          Additional Information About Your Visit        Care EveryWhere ID     This is your Care EveryWhere ID. This could be used by other organizations to access your [place] medical records  [id number]        Your Vitals Were     Pulse Temperature Height Pulse Oximetry Breastfeeding? BMI (Body Mass Index)    76 97.7  F (36.5  C) (Oral) 1.594 m (5' 2.75\") " 100% No 35.84 kg/m2       Blood Pressure from Last 3 Encounters:   10/02/18 146/77   09/26/18 149/80   09/12/18 132/68    Weight from Last 3 Encounters:   10/02/18 91 kg (200 lb 11.2 oz)   09/12/18 90.7 kg (199 lb 14.4 oz)   01/04/18 91 kg (200 lb 9.6 oz)               Primary Care Provider Office Phone # Fax #    Jossie Luciano [phone] [address] [place] [address]        Equal Access to Services     [facility]: Hadii aad ku hadasho Soomaali, waaxda luqadaha, qaybta kaalmada ademoniqueyada, mookie palafox . So [facility] [phone].    ATENCIÓN: Si habla español, tiene a delgado disposición servicios gratuitos de asistencia lingüística. [facility] [phone].    We comply with applicable federal civil rights laws and [place] laws. We do not discriminate on the basis of race, color, national origin, age, disability, sex, sexual orientation, or gender identity.            Thank you!     Thank you for choosing [facility]  for your care. Our goal is always to provide you with excellent care. Hearing back from our patients is one way we can continue to improve our services. Please take a few minutes to complete the written survey that you may receive in the mail after your visit with us. Thank you!             Your Updated Medication List - Protect others around you: Learn how to safely use, store and throw away your medicines at www.disposemymeds.org.          This list is accurate as of 10/2/18  2:54 PM.  Always use your most recent med list.                   Brand Name Dispense Instructions for use Diagnosis    ACE NOT PRESCRIBED (INTENTIONAL)      ACE Inhibitor not prescribed due to Refusal by patient        ACE/ARB/ARNI NOT PRESCRIBED (INTENTIONAL)      ACE & ARB not prescribed due to Symptomatic hypotension not due to excessive diuresis    Type 2 diabetes mellitus without complication, without long-term current use of insulin (H)        albuterol (2.5 MG/3ML) 0.083% neb solution     360 mL    Take 1 vial (2.5 mg) by nebulization every 6 hours as needed for shortness of breath / dyspnea or wheezing    Mild intermittent asthma, unspecified whether complicated       ascorbic acid 500 MG tablet    VITAMIN C     Take 1 tablet by mouth        aspirin 81 MG EC tablet     90 tablet    Take 1 tablet (81 mg) by mouth daily    Diabetes mellitus, type 2 (H)       Astaxanthin 4 MG Caps      Take 1 tablet by mouth daily        ATORVASTATIN CALCIUM PO      Take 40 mg by mouth daily        BENADRYL PO      Take 6.25 mg by mouth as needed        blood glucose monitoring lancets     400 each    Use as directed upto 6 times daily    Other specified hypothyroidism, Type 2 diabetes mellitus without complication, without long-term current use of insulin (H), Mixed hyperlipidemia       blood glucose monitoring test strip    ACCU-CHEK JINA PLUS    400 each    Use as directed up to 6 times daily    Type 2 diabetes mellitus without complication, without long-term current use of insulin (H)       calcium citrate-vitamin D 315-250 MG-UNIT Tabs per tablet    CITRACAL     Take 1 tablet by mouth daily        calcium-magnesium 500-250 MG Tabs per tablet    CALMAG     Take 1 tablet by mouth daily Calcium Magnesium Intensive Care: Each Tablet: 15 mg Vit C, 125 international units  VIt D, 250 mg Calcium carbonate, 100 mg Mg        Carboxymethylcellulose Sod PF 0.5 % Soln ophthalmic solution    REFRESH PLUS     1 drop daily as needed for dry eyes        CENTRUM SILVER per tablet      daily (every 24 hours).        CoQ-10 200 MG Caps      Take 200 mg by mouth daily        ESSENTIAL ONE DAILY MULTIVIT PO      Take 1 capsule by mouth daily        glyBURIDE 2.5 MG tablet    DIABETA /MICRONASE    270 tablet    Take 1 tablet (2.5 mg) by mouth 2 times daily (with meals)    Type 2 diabetes mellitus without complication, without long-term current use of insulin (H)       ketoconazole 2 %  cream    NIZORAL    60 g    Apply topically 2 times daily    Type 2 diabetes mellitus with hyperglycemia (H)       levothyroxine 112 MCG tablet    SYNTHROID/LEVOTHROID    90 tablet    Take 1 tablet (112 mcg) by mouth daily Take 1/2 tab on Sundays    Hypothyroidism, unspecified type       LUTEIN-ZEAXANTHIN-BILBERRY PO      Take 1 capsule by mouth 2 times daily 10 mg lutein, 5 mg zeaxanthin, 2000 international, Vit A, 200 mg VIt C, 60 Vit E, Bioflavonoid complex, green tea, epigallocatechin gallate, grape seed, black elder extract        MAGNESIUM MALATE PO      Take 0.5 tablets by mouth 2 times daily        metFORMIN 500 MG tablet    GLUCOPHAGE    360 tablet    Take 1 tablet (500 mg) by mouth 3 times daily (with meals) Take 1 and 1/2 tab qam, 1/2 tab q lunch and 1/2 tab q pm    Type 2 diabetes mellitus without complication, without long-term current use of insulin (H)       metoprolol succinate 25 MG 24 hr tablet    TOPROL-XL     Take 0.5 tablets (12.5 mg) by mouth daily        OMEGA ESSENTIALS BASIC PO      Take 1 capsule by mouth daily        VITAMIN D3 PO      Take by mouth 2 times daily        VITAMIN K PO      Take 300 mcg by mouth daily

## 2018-10-02 NOTE — LETTER
10/2/2018         RE: Margaret Ramos  [address] [place] [address]        Dear Colleague,    Thank you for referring your patient, Margaret Ramos, to the [facility]. Please see a copy of my visit note below.    Name: Margaret Ramos  Seen for f/u. [facility]   HPI:  Margaret Ramos is a 73 year old female who presents for the evaluation/management of DM.  She typically goes to bed late at night around midnight-2:00 in the morning and wakes up around 10-11:00 in the morning.  Typically has breakfast around noon, afternoon snack of popcorn, supper and typically has bedtime snack every single night.   She tells me that she is not consistent with evening dose of glyburide and is missing metformin in afternoon.  She is worried about hypoglycemia.  He does not like to take medications.  Reports that she will take extra pill of metformin if the blood sugar is high at night but after she has to wake up at night with abdominal pain and cramping if she takes nighttime dose of metformin.  She is taking regular metformin and is resistant to switch to metformin extended release.    1. Type 2 DM:  Orginally diagnosed at the age of: 54 in 11/99. Initially on glucovance, then switched to metformin and glyburide due to formulary issues.  Current Regimen: Metformin 750 mg qam,metformin  qpm and glyburide 2.5 mg bid( not consistent)  BS checks: bid 202.  Limited blood sugar data available and  Average Meter Download: It is variable.  Blood sugars ranging from 141-334.  No major episodes of hypoglycemia noted or reported.    Complications:   Diabetes Complications  Description / Detail    Diabetic Retinopathy  No   CAD / PAD  Yes. S/p stent placement   Neuropathy  No   Nephropathy / Microalbuminuria  No   Gastroparesis  No   Hypoglycemia [place]  No     2. Hypertension: Blood Pressure today:   BP Readings from Last 3 Encounters:   10/02/18 136/60   09/26/18 149/80   09/12/18 132/68     Not on medication.   She refused medications in past.  3. Hyperlipidemia: On medication  PMH/PSH:  Past Medical History:   Diagnosis Date     ASCVD (arteriosclerotic cardiovascular disease) 8/10/2016     Chronic ischemic heart disease 6/13/2016     Hyperparathyroidism (H) 1/4/2018     Hypothyroidism 10/20/2014     Inferior MI (H) 8/10/2016    2016      Mixed hyperlipidemia (DYSLIPIDEMIA) 10/20/2014     Morbid obesity (H) 10/13/2015     S/P drug eluting coronary stent placement 8/10/2016    RCA 6/2016      Type 2 diabetes mellitus with hyperglycemia (H) 10/20/2014       Past Surgical History:   Procedure Laterality Date     APPENDECTOMY      age 7     BACK SURGERY  1978     CARPAL TUNNEL RELEASE RT/LT      bilateral     wisdom teeth extraction       Family Hx:  Family History   Problem Relation Age of Onset     Diabetes Mother      type 2     Diabetes Sister      both sisters have type 2     Coronary Artery Disease Sister      Glaucoma Maternal Aunt      Macular Degeneration No family hx of      Thyroid disease: No         DM2: Yes - sisters,mother         Autoimmune: DM1, SLE, RA, Vitiligo No    Social Hx:  Social History     Social History     Marital status: Single     Spouse name: N/A     Number of children: 0     Years of education: N/A     Occupational History     Not on file.     Social History Main Topics     Smoking status: Never Smoker     Smokeless tobacco: Never Used     Alcohol use No     Drug use: No     Sexual activity: Not Currently     Other Topics Concern     Parent/Sibling W/ Cabg, Mi Or Angioplasty Before 65f 55m? No     Social History Narrative          MEDICATIONS:  has a current medication list which includes the following prescription(s): ACE NOT PRESCRIBED, INTENTIONAL,, ACE/ARB NOT PRESCRIBED, INTENTIONAL,, albuterol, ascorbic acid, aspirin, astaxanthin, atorvastatin calcium, blood glucose monitoring, blood glucose monitoring, calcium citrate-vitamin d, calcium-magnesium, carboxymethylcellulose sod pf,  "cholecalciferol, coq-10, diphenhydramine hcl, glyburide, ketoconazole, levothyroxine, lutein-zeaxanthin-bilberry, magnesium malate, metformin, metoprolol succinate, multiple vitamins-calcium, centrum silver, omega-3 fatty acids, and vitamin k.    ROS     ROS: 10 point ROS neg other than the symptoms noted above in the HPI.    Physical Exam   VS: /60 (BP Location: Right arm, Patient Position: Chair, Cuff Size: Adult Large)  Pulse 76  Temp 97.7  F (36.5  C) (Oral)  Ht 1.594 m (5' 2.75\")  Wt 91 kg (200 lb 11.2 oz)  SpO2 100%  Breastfeeding? No  BMI 35.84 kg/m2  GENERAL: AXOX3, NAD, well dressed, answering questions appropriately, appears stated age.  HEENT: No exopthalmous, no proptosis, EOMI, no lig lag, no retraction  NECK: Thyroid normal in size, non tender, no nodules were palpated.  CV: RRR  LUNGS: CTAB  ABDOMEN: +BS  NEUROLOGY: CN grossly intact, no tremors  PSYCH: normal affect and mood      LABS:  Lab Results   Component Value Date    A1C 8.3 08/10/2018    A1C 7.7 12/28/2017    A1C 7.9 08/22/2017    A1C 7.9 11/29/2016    A1C 8.0 06/02/2016     Lab Results   Component Value Date    UMALCR Unable to calculate due to low value 11/29/2016         ENDO THYROID LABS-[facility] Latest Ref Rng & Units 8/10/2018 12/28/2017   TSH 0.40 - 4.00 mU/L 1.16 1.23   T4 FREE 0.76 - 1.46 ng/dL 1.06    FREE T3 2.3 - 4.2 pg/mL       ENDO THYROID LABS-[facility] Latest Ref Rng & Units 9/12/2017 11/29/2016   TSH 0.40 - 4.00 mU/L 0.86 0.76   T4 FREE 0.76 - 1.46 ng/dL 1.30 1.21   FREE T3 2.3 - 4.2 pg/mL 2.0 (L)      ENDO THYROID LABS-[facility] Latest Ref Rng & Units 12/3/2015   TSH 0.40 - 4.00 mU/L 0.34 (L)   T4 FREE 0.76 - 1.46 ng/dL 1.21   FREE T3 2.3 - 4.2 pg/mL      ENDO CALCIUM LABS-[facility] Latest Ref Rng & Units 8/10/2018   CALCIUM 8.5 - 10.1 mg/dL 9.8     ENDO CALCIUM LABS-[facility] Latest Ref Rng & Units 8/22/2017 11/29/2016   CALCIUM 8.5 - 10.1 mg/dL 10.1 10.0     ENDO CALCIUM LABS-[facility] Latest Ref Rng & Units 7/26/2016 7/14/2016   CALCIUM 8.5 - " 10.1 mg/dL  10.6     ENDO CALCIUM LABS-[facility] Latest Ref Rng & Units 8/10/2018 12/28/2017   PARATHYROID HORMONE INTACT 18 - 80 pg/mL 107 (H) 87 (H)     ENDO CALCIUM LABS-[facility] Latest Ref Rng & Units 9/12/2017 8/22/2017   PARATHYROID HORMONE INTACT 18 - 80 pg/mL  100 (H)     ENDO CALCIUM LABS-[facility] Latest Ref Rng & Units 11/29/2016   PARATHYROID HORMONE INTACT 18 - 80 pg/mL 100 (H)     Vitamin D Deficiency Screening Results:  Lab Results   Component Value Date    VITDT 53 08/10/2018    VITDT 42 12/28/2017    VITDT 48 09/12/2017    VITDT 41 11/29/2016    VITDT 36 12/08/2015       All pertinent notes, labs, and images personally reviewed by me.     A/P  Ms.Alice LEONARD Ramos is a 73 year old here for the evaluation/management of diabetes:    1. DM2 -under poor control. A1c 8.3%.  Diabetes complicated by CAD status post stent placement.  Dietary indiscretion playing a role in worsening control.  She is not consistent with medication and missing glyburide many times.  She is resistant to go up on metformin or trial of extended release metformin   plan:  Take metformin 1.5 mg (750 mg) in AM and 1.5 mg with popcorn.  Try to increase the dose to 1000 mg twice daily if tolerated.  Take glyburide 2.5 mg twice a day with meals.      Cut down on potatoes With breakfast  Decrease quantity of popcorn/afternoon snack  Be consistent with medications and meals.  Instead of a big breakfast try to have 2 small meals for 5 hours apart    Recommend checking blood sugars before meals and at bedtime.    If Blood glucose are low more often-> 2-3 times/week- give us a call.  The patient is advised to Make better food choices: reduce carbs, Reduce portion size, weight loss and exercise 3-4 times a week.  Discussed hypoglycemia signs and symptoms as well as management in detail.     2. Hypertension -under good control.  Previously ACE discussed, pt is reluctant to start.     3. Hyperlipidemia - Under Fair control.  Taking atorvastatin 40 mg.    4.  Prevention  Dental-Yes  ASA-yes  Smoking- No    5. Hypothyroidism  On levoT 112 mcg qd  with 1/2 tab on Sundays.  Continue current dose of levothyroxine.  Recent labs in normal range.  Plan:  Continue levothyroxine at current dose 112 mcg/day for 6 days a week and 56 mcg/day for 1 day a week.  Clinically looks euthyroid.  Labs in 3-6 months    7.  Hyperparathyroidism :  Mildly elevated PTH at 100 c/w possible early primary hyperparathyroidism.  Calcium normal.  Vitamin D normal.  Kidney function normal  DEXA showing osteoporosis. Not on medication at this time.  Plan- continue to monitor at this time in the setting of stable PTH, normal calcium levels.    Repeat labs in 3-6 months.  Consider parathyroid scan based on that.  Continue vitamin D replacement.    8. Osteoporosis:  Discussed DEXA scan showing osteoporosis  Discussed treatment options including Fosamax.  At this time she would like to concentrate on calcium and vitamin D supplement and is not ready for medication  Risk of fractures and complications associated with osteoporosis was discussed with patient and she understands.  The pt was advised to    Maintain an adequate calcium and vitamin D intake and to supplement vitamin D if needed to maintain serum levels of 25 hydroxy D (25 OH D) between 30-60 ng/ml.    Limit alcohol intake to no more than 2 servings per day.    Limit caffeine intake.    Maintain an active lifestyle including weight-bearing exercises for at least 30 mins daily.    Take measures to reduce the risk of falling.      All questions were answered.  The patient indicates understanding of the above issues and agrees with the plan set forth.       More than 50% of face to face time spent with Ms. Ramos on counseling / coordinating her care.  Total face to face time was 45 minutes.      Follow-up:  follow up in 3 months    [place]  CC: Jossie Luciano MD    Again, thank you for allowing me to participate in the care  of your patient.        Sincerely,        Rosario Shannon MD

## 2018-10-02 NOTE — NURSING NOTE
"ENDOCRINOLOGY INTAKE FORM    Patient Name:  Margaret Ramos  :  1945    Is patient Diabetic?   Yes: type 2  Does patient have non-diabetic or other endocrine issues?  Yes: hypothyroidism, obesity    Vitals: /77 (BP Location: Right arm, Patient Position: Chair, Cuff Size: Adult Large)  Pulse 76  Temp 97.7  F (36.5  C) (Oral)  Ht 1.594 m (5' 2.75\")  Wt 91 kg (200 lb 11.2 oz)  SpO2 100%  Breastfeeding? No  BMI 35.84 kg/m2  BMI= Body mass index is 35.84 kg/(m^2).    Flu vaccine:  No  Pneumonia vaccine:  Yes: 1 pneumovax    Smoking and Alcohol use:  Social History   Substance Use Topics     Smoking status: Never Smoker     Smokeless tobacco: Never Used     Alcohol use No       Foot Exam: Yes: 18  Eye Exam:  No  Dental Exam:  Yes: every 3 mos  Aspirin Use:  Yes: 81 mg    Lab Results   Component Value Date    A1C 8.3 08/10/2018    A1C 7.7 2017    A1C 7.9 2017    A1C 7.9 2016    A1C 8.0 2016       Lab Results   Component Value Date    MICROL <5 2016     No results found for: MICROALBUMIN    Rosie Francis CMA    "

## 2018-10-02 NOTE — PATIENT INSTRUCTIONS
[facility]   Dr Shannon, Endocrinology Department      [facility]   [address]  [place] [address]  Appointment [phone]  Fax: [phone]  [place]: Monday and Tuesday         [facility]   [address]  [place] [address]  Appointment [phone]  Fax: [phone]  [place]: Wednesday and Thursday            Continue current regimen  Try to take metformin consistently  Cut down on carbs at night  Small 3- 4 meals/day  Labs in 3 months  Please make a lab appointment for blood work and follow up clinic appointment in 1 week after that to discuss results.    You should get 1200 mg/day calcium in divided doses of no more than 500 mg/dose.  This INCLUDES what is in your food as well as what is in any supplements you may be taking.    Vit D about 1000 IU/day ( unless you have vit D deficiency- in that case higher dose)  Dietary sources of calcium:: These also contain vitamin D  Milk                            8 oz            300 mg calcium  Yogurt                          1 cup           400 mg calcium   Hard cheese                     1.5 oz          300 mg  Cottage cheese                  2 cup           300 mg  Orange juice with Calcium       8 oz            300 mg  Low fat dairy sources are recommended      You should get 30 minutes of moderate weight bearing exercise on most days of the week .  Weight bearing exercise includes such things as walking, jogging, hiking, dancing.  You should also get Strength training 2 or more times/week in addition to other weight -being exercise. Strength training uses weight or resistance beyond that seen in everyday activities -(pilates, weight training with free weights, weight machines or resistance bands)

## 2018-10-24 ENCOUNTER — TELEPHONE (OUTPATIENT)
Dept: ENDOCRINOLOGY | Facility: CLINIC | Age: 73
End: 2018-10-24

## 2018-10-24 NOTE — TELEPHONE ENCOUNTER
Panel Management Review      Patient has the following on her problem list:     Diabetes    ASA: Passed    Last A1C  Lab Results   Component Value Date    A1C 8.3 08/10/2018    A1C 7.7 12/28/2017    A1C 7.9 08/22/2017    A1C 7.9 11/29/2016    A1C 8.0 06/02/2016     A1C tested: FAILED    Last LDL:    Lab Results   Component Value Date    CHOL 205 08/10/2018     Lab Results   Component Value Date    HDL 57 08/10/2018     Lab Results   Component Value Date     08/10/2018     Lab Results   Component Value Date    TRIG 124 08/10/2018     Lab Results   Component Value Date    CHOLHDLRATIO 3.7 04/30/2015     Lab Results   Component Value Date    NHDL 148 08/10/2018       Is the patient on a Statin? YES             Is the patient on Aspirin? YES    Medications     [facility] CoA Reductase Inhibitors    ATORVASTATIN CALCIUM PO    Salicylates    aspirin 81 MG EC tablet          Last three blood pressure readings:  BP Readings from Last 3 Encounters:   10/02/18 136/60   09/26/18 149/80   09/12/18 132/68       Date of last diabetes office visit: 10/02/18     Tobacco History:     History   Smoking Status     Never Smoker   Smokeless Tobacco     Never Used           Composite cancer screening  Chart review shows that this patient is due/due soon for the following Colonoscopy  Summary:    Patient is due/failing the following:   COLONOSCOPY and FOLLOW UP    Action needed:   Patient needs office visit for dm with endo.    Type of outreach:    none, patient just had an appt and has a future appt adam.    Questions for provider review:    None                                                                                                                                    Rosie Francis CMA       Chart routed to no one .

## 2018-11-06 ENCOUNTER — TRANSFERRED RECORDS (OUTPATIENT)
Dept: HEALTH INFORMATION MANAGEMENT | Facility: CLINIC | Age: 73
End: 2018-11-06

## 2018-12-11 DIAGNOSIS — E11.65 TYPE 2 DIABETES MELLITUS WITH HYPERGLYCEMIA, WITHOUT LONG-TERM CURRENT USE OF INSULIN (H): ICD-10-CM

## 2018-12-11 LAB
HBA1C MFR BLD: 8.3 % (ref 0–5.6)
PTH-INTACT SERPL-MCNC: 80 PG/ML (ref 18–80)

## 2018-12-11 PROCEDURE — 82565 ASSAY OF CREATININE: CPT | Performed by: INTERNAL MEDICINE

## 2018-12-11 PROCEDURE — 83036 HEMOGLOBIN GLYCOSYLATED A1C: CPT | Performed by: INTERNAL MEDICINE

## 2018-12-11 PROCEDURE — 84100 ASSAY OF PHOSPHORUS: CPT | Performed by: INTERNAL MEDICINE

## 2018-12-11 PROCEDURE — 83735 ASSAY OF MAGNESIUM: CPT | Performed by: INTERNAL MEDICINE

## 2018-12-11 PROCEDURE — 36415 COLL VENOUS BLD VENIPUNCTURE: CPT | Performed by: INTERNAL MEDICINE

## 2018-12-11 PROCEDURE — 82306 VITAMIN D 25 HYDROXY: CPT | Performed by: INTERNAL MEDICINE

## 2018-12-11 PROCEDURE — 83970 ASSAY OF PARATHORMONE: CPT | Performed by: INTERNAL MEDICINE

## 2018-12-11 PROCEDURE — 82310 ASSAY OF CALCIUM: CPT | Performed by: INTERNAL MEDICINE

## 2018-12-12 LAB
CALCIUM SERPL-MCNC: 10.8 MG/DL (ref 8.5–10.1)
CREAT SERPL-MCNC: 0.76 MG/DL (ref 0.52–1.04)
DEPRECATED CALCIDIOL+CALCIFEROL SERPL-MC: 50 UG/L (ref 20–75)
GFR SERPL CREATININE-BSD FRML MDRD: 74 ML/MIN/1.7M2
MAGNESIUM SERPL-MCNC: 2.2 MG/DL (ref 1.6–2.3)
PHOSPHATE SERPL-MCNC: 3.5 MG/DL (ref 2.5–4.5)

## 2018-12-18 ENCOUNTER — OFFICE VISIT (OUTPATIENT)
Dept: ENDOCRINOLOGY | Facility: CLINIC | Age: 73
End: 2018-12-18
Payer: COMMERCIAL

## 2018-12-18 VITALS
SYSTOLIC BLOOD PRESSURE: 144 MMHG | HEART RATE: 83 BPM | DIASTOLIC BLOOD PRESSURE: 60 MMHG | OXYGEN SATURATION: 99 % | HEIGHT: 63 IN | BODY MASS INDEX: 34.69 KG/M2 | WEIGHT: 195.8 LBS | TEMPERATURE: 97.6 F

## 2018-12-18 DIAGNOSIS — E83.52 HYPERCALCEMIA: ICD-10-CM

## 2018-12-18 DIAGNOSIS — M85.80 OSTEOPENIA, UNSPECIFIED LOCATION: ICD-10-CM

## 2018-12-18 DIAGNOSIS — E11.65 TYPE 2 DIABETES MELLITUS WITH HYPERGLYCEMIA, WITHOUT LONG-TERM CURRENT USE OF INSULIN (H): Primary | ICD-10-CM

## 2018-12-18 DIAGNOSIS — E21.3 HYPERPARATHYROIDISM (H): ICD-10-CM

## 2018-12-18 DIAGNOSIS — E03.8 OTHER SPECIFIED HYPOTHYROIDISM: ICD-10-CM

## 2018-12-18 PROCEDURE — 99215 OFFICE O/P EST HI 40 MIN: CPT | Performed by: INTERNAL MEDICINE

## 2018-12-18 ASSESSMENT — MIFFLIN-ST. JEOR: SCORE: 1358.3

## 2018-12-18 NOTE — PATIENT INSTRUCTIONS
[facility] & [place] locations   Dr Shannon, Endocrinology Department      [facility]   [address]  [place] [address]  Appointment [phone]  Fax: [phone]  [place]: Monday and Tuesday         [facility]   [address]  [place] [address]  Appointment [phone]  Fax: [phone]  [place]: Wednesday and Thursday          Continue current regimen.  Your provider has referred you to Diabetes Education: For all [facility]:  Phone [phone]; Fax [phone]  Please call and make the appointment.-->continous glucose monitor (dexom, ipro) (diagnostic)    Labs in 3 months.  Please make a lab appointment for blood work and follow up clinic appointment in 1 week after that to discuss results.    The pt was advised to    Maintain an adequate calcium and vitamin D intake and to supplement vitamin D if needed to maintain serum levels of 25 hydroxy D (25 OH D) between 30-60 ng/ml.    Limit alcohol intake to no more than 2 servings per day.    Limit caffeine intake.    Maintain an active lifestyle including weight-bearing exercises for at least 30 mins daily.    Take measures to reduce the risk of falling.    Check Blood glucose fasting every day and before lunch/dinner/bedtime on alternate days.  If Blood glucose are low more often-> 2-3 times/week- give us a call.  The patient is advised to Make better food choices: reduce carbs, Reduce portion size, weight loss and exercise 3-4 times a week.  Discussed hypoglycemia signs and symptoms as well as management in detail.

## 2018-12-18 NOTE — NURSING NOTE
"ENDOCRINOLOGY INTAKE FORM    Patient Name:  Margaret Ramos  :  1945    Is patient Diabetic?   Yes: type 2  Does patient have non-diabetic or other endocrine issues?  Yes: hypothyroidism, osteopenia    Vitals: /60 (BP Location: Right arm, Patient Position: Chair, Cuff Size: Adult Large)   Pulse 83   Temp 97.6  F (36.4  C) (Oral)   Ht 1.594 m (5' 2.75\")   Wt 88.8 kg (195 lb 12.8 oz)   SpO2 99%   Breastfeeding? No   BMI 34.96 kg/m    BMI= Body mass index is 34.96 kg/m .    Flu vaccine:  No  Pneumonia vaccine:  Yes: pneumovax    Smoking and Alcohol use:  Social History     Tobacco Use     Smoking status: Never Smoker     Smokeless tobacco: Never Used   Substance Use Topics     Alcohol use: No     Alcohol/week: 0.0 oz     Drug use: No       Foot Exam: Yes: 18  Eye Exam:  Yes: 18  Dental Exam:  Yes: Nov  Aspirin Use:  Yes: 81 mg    Lab Results   Component Value Date    A1C 8.3 2018    A1C 8.3 08/10/2018    A1C 7.7 2017    A1C 7.9 2017    A1C 7.9 2016       Lab Results   Component Value Date    MICROL <5 2016     No results found for: MICROALBUMIN    Rosie Francis CMA    "

## 2018-12-18 NOTE — PROGRESS NOTES
Name: Margaret Ramos  Seen for f/u. Earlier [facility] .  Here with her sister Rosy.  HPI:  Margaret Ramos is a 73 year old female who presents for the evaluation/management of DM.  She typically goes to bed late at night around midnight-2:00 in the morning and wakes up around 10-11:00 in the morning.  Typically has breakfast around noon, afternoon snack of popcorn, supper and typically has bedtime snack every single night.   She tells me that she is not consistent with evening dose of glyburide and is missing metformin in afternoon.  She is worried about hypoglycemia.  He does not like to take medications.  Reports that she will take extra pill of metformin if the blood sugar is high at night but after she has to wake up at night with abdominal pain and cramping if she takes nighttime dose of metformin.  She is taking regular metformin and is resistant to switch to metformin extended release.    1. Type 2 DM:  Orginally diagnosed at the age of: 54 in 11/99. Initially on glucovance, then switched to metformin and glyburide due to formulary issues.  Current Regimen: Metformin 750 mg qam,metformin  qpm and 250  Mg at bedtime if BG > 200 and glyburide 2.5 mg in AM with breakfast and 2.5 mg at supper but is not.  BS checks: bid 202.  Limited blood sugar data available and  Average Meter Download: It is variable.  Blood sugars ranging from 141-334.  No major episodes of hypoglycemia noted or reported.  Meals: breakfast/brunch: around noon, afternoon snack at 3:00- 4:00 AM (popcorn), supper (variable timings).  Not much eating out.    Complications:   Diabetes Complications  Description / Detail    Diabetic Retinopathy  No   CAD / PAD  Yes. S/p stent placement   Neuropathy  No   Nephropathy / Microalbuminuria  No   Gastroparesis  No   Hypoglycemia [place]  No     2. Hypertension: Blood Pressure today:   BP Readings from Last 3 Encounters:   12/18/18 144/60   10/02/18 136/60   09/26/18 149/80     Not on  medication.  She refused medications in past.  3. Hyperlipidemia: On medication  PMH/PSH:  Past Medical History:   Diagnosis Date     ASCVD (arteriosclerotic cardiovascular disease) 8/10/2016     Chronic ischemic heart disease 6/13/2016     Hyperparathyroidism (H) 1/4/2018     Hypothyroidism 10/20/2014     Inferior MI (H) 8/10/2016    2016      Mixed hyperlipidemia (DYSLIPIDEMIA) 10/20/2014     Morbid obesity (H) 10/13/2015     S/P drug eluting coronary stent placement 8/10/2016    RCA 6/2016      Type 2 diabetes mellitus with hyperglycemia (H) 10/20/2014       Past Surgical History:   Procedure Laterality Date     APPENDECTOMY      age 7     BACK SURGERY  1978     CARPAL TUNNEL RELEASE RT/LT      bilateral     wisdom teeth extraction       Family Hx:  Family History   Problem Relation Age of Onset     Diabetes Mother         type 2     Diabetes Sister         both sisters have type 2     Coronary Artery Disease Sister      Glaucoma Maternal Aunt      Macular Degeneration No family hx of      Thyroid disease: No         DM2: Yes - sisters,mother         Autoimmune: DM1, SLE, RA, Vitiligo No    Social Hx:  Social History     Socioeconomic History     Marital status: Single     Spouse name: Not on file     Number of children: 0     Years of education: Not on file     Highest education level: Not on file   Social Needs     Financial resource strain: Not on file     Food insecurity - worry: Not on file     Food insecurity - inability: Not on file     Transportation needs - medical: Not on file     Transportation needs - non-medical: Not on file   Occupational History     Not on file   Tobacco Use     Smoking status: Never Smoker     Smokeless tobacco: Never Used   Substance and Sexual Activity     Alcohol use: No     Alcohol/week: 0.0 oz     Drug use: No     Sexual activity: Not Currently   Other Topics Concern     Parent/sibling w/ CABG, MI or angioplasty before 65F 55M? No   Social History Narrative     Not on file  "         MEDICATIONS:  has a current medication list which includes the following prescription(s): ACE NOT PRESCRIBED, INTENTIONAL,, ace/arb/arni not prescribed, albuterol, vitamin c, aspirin, astaxanthin, atorvastatin calcium, blood glucose monitoring, blood glucose monitoring, calcium citrate-vitamin d, calcium-magnesium, carboxymethylcellulose sod pf, cholecalciferol, coq-10, diphenhydramine hcl, glyburide, ketoconazole, levothyroxine, lutein-zeaxanthin-bilberry, magnesium malate, metformin, multiple vitamins-calcium, multivitamin, omega-3 fatty acids, and vitamin k.    ROS     ROS: 10 point ROS neg other than the symptoms noted above in the HPI.    Physical Exam   VS: /60 (BP Location: Right arm, Patient Position: Chair, Cuff Size: Adult Large)   Pulse 83   Temp 97.6  F (36.4  C) (Oral)   Ht 1.594 m (5' 2.75\")   Wt 88.8 kg (195 lb 12.8 oz)   SpO2 99%   Breastfeeding? No   BMI 34.96 kg/m    GENERAL: AXOX3, NAD, well dressed, answering questions appropriately, appears stated age.  HEENT: No exopthalmous, no proptosis, EOMI, no lig lag, no retraction  NEUROLOGY: CN grossly intact, no tremors  PSYCH: normal affect and mood      LABS:  Lab Results   Component Value Date    A1C 8.3 12/11/2018    A1C 8.3 08/10/2018    A1C 7.7 12/28/2017    A1C 7.9 08/22/2017    A1C 7.9 11/29/2016     Lab Results   Component Value Date    UMALCR Unable to calculate due to low value 11/29/2016          ENDO THYROID LABS-[facility] Latest Ref Rng & Units 8/10/2018 12/28/2017   TSH 0.40 - 4.00 mU/L 1.16 1.23   T4 FREE 0.76 - 1.46 ng/dL 1.06    FREE T3 2.3 - 4.2 pg/mL       ENDO THYROID LABS-[facility] Latest Ref Rng & Units 9/12/2017 11/29/2016   TSH 0.40 - 4.00 mU/L 0.86 0.76   T4 FREE 0.76 - 1.46 ng/dL 1.30 1.21   FREE T3 2.3 - 4.2 pg/mL 2.0 (L)      ENDO THYROID LABS-[facility] Latest Ref Rng & Units 12/3/2015   TSH 0.40 - 4.00 mU/L 0.34 (L)   T4 FREE 0.76 - 1.46 ng/dL 1.21   FREE T3 2.3 - 4.2 pg/mL      ENDO CALCIUM LABS-[facility] Latest Ref Rng & " Units 8/10/2018   CALCIUM 8.5 - 10.1 mg/dL 9.8     ENDO CALCIUM LABS-[facility] Latest Ref Rng & Units 8/22/2017 11/29/2016   CALCIUM 8.5 - 10.1 mg/dL 10.1 10.0     ENDO CALCIUM LABS-[facility] Latest Ref Rng & Units 7/26/2016 7/14/2016   CALCIUM 8.5 - 10.1 mg/dL  10.6     ENDO CALCIUM LABS-[facility] Latest Ref Rng & Units 8/10/2018 12/28/2017   PARATHYROID HORMONE INTACT 18 - 80 pg/mL 107 (H) 87 (H)     ENDO CALCIUM LABS-[facility] Latest Ref Rng & Units 9/12/2017 8/22/2017   PARATHYROID HORMONE INTACT 18 - 80 pg/mL  100 (H)     ENDO CALCIUM LABS-[facility] Latest Ref Rng & Units 11/29/2016   PARATHYROID HORMONE INTACT 18 - 80 pg/mL 100 (H)     Vitamin D Deficiency Screening Results:  Lab Results   Component Value Date    VITDT 50 12/11/2018    VITDT 53 08/10/2018    VITDT 42 12/28/2017    VITDT 48 09/12/2017    VITDT 41 11/29/2016       All pertinent notes, labs, and images personally reviewed by me.     A/P  Ms.Alice LEONARD Ramos is a 73 year old here for the evaluation/management of diabetes:    1. DM2 -under poor control. A1c 8.3%.  Diabetes complicated by CAD status post stent placement.  Dietary indiscretion playing a role in worsening control.  She is not consistent with medication and missing glyburide (especially evening dose ) many times.  She is resistant to go up on metformin or trial of extended release metformin   plan:  Take metformin 1.5 mg (750 mg) in AM and 1.5 mg with popcorn.  Try to increase the dose to 1000 mg twice daily if tolerated.  Take glyburide 2.5 mg twice a day with meals.  Be consistent with it.  She needs to have specific schedule about meal timings and carb content with meals  Recommend diagnostic CGM.  CDE referral in place.  Labs and follow-up in 3 months.    Cut down on potatoes With breakfast  Decrease quantity of popcorn/afternoon snack  Be consistent with medications and meals.    Recommend checking blood sugars before meals and at bedtime.    If Blood glucose are low more often-> 2-3 times/week- give us a  call.  The patient is advised to Make better food choices: reduce carbs, Reduce portion size, weight loss and exercise 3-4 times a week.  Discussed hypoglycemia signs and symptoms as well as management in detail.     2. Hypertension -under good control.  Previously ACE discussed, pt is reluctant to start.     3. Hyperlipidemia - Under Fair control.  Taking atorvastatin 40 mg.    4. Prevention  Dental-Yes  ASA-yes  Smoking- No    5. Hypothyroidism  On levoT 112 mcg qd  with 1/2 tab on Sundays.  Continue current dose of levothyroxine.  Recent labs in normal range.  Plan:  Continue levothyroxine at current dose 112 mcg/day for 6 days a week and 56 mcg/day for 1 day a week.  Clinically looks euthyroid.  Labs in 3  months    7.  Hyperparathyroidism :  Mildly elevated PTH at 100 c/w possible early primary hyperparathyroidism.  Calcium normal.  Vitamin D normal.  Kidney function normal  DEXA showing osteoporosis. Not on medication at this time.  Plan- continue to monitor at this time in the setting of stable PTH, normal calcium levels.    Repeat labs in 3-6 months.  Consider parathyroid scan based on that.  Continue vitamin D replacement.    8.  Hypercalcemia (new):  Noted on labs done in December 2018  She is telling me that she was not drinking much water around this time  PTH is in normal range  Plan:  In the setting of asymptomatic nature plan to continue to monitor and close follow-up.  Labs in 3 months.  Encourage good hydration.    9. Osteoporosis:  Discussed DEXA scan showing osteoporosis  Discussed treatment options including Fosamax.  At this time she would like to concentrate on calcium and vitamin D supplement and is not ready for medication  Risk of fractures and complications associated with osteoporosis was discussed with patient and she understands.  The pt was advised to    Maintain an adequate calcium and vitamin D intake and to supplement vitamin D if needed to maintain serum levels of 25 hydroxy D (25  OH D) between 30-60 ng/ml.    Limit alcohol intake to no more than 2 servings per day.    Limit caffeine intake.    Maintain an active lifestyle including weight-bearing exercises for at least 30 mins daily.    Take measures to reduce the risk of falling.      All questions were answered.  The patient indicates understanding of the above issues and agrees with the plan set forth.       More than 50% of face to face time spent with Ms. Ramos on counseling / coordinating her care.  Total face to face time was 45 minutes.      Follow-up:  Follow up in 3 months.    Rosario Buenrostro  CC: Jossie Luciano MD

## 2018-12-18 NOTE — LETTER
12/18/2018         RE: Margaret Ramos  [address] [place] [address]        Dear Colleague,    Thank you for referring your patient, Margaret Ramos, to the [facility]. Please see a copy of my visit note below.    Name: Margaret Ramos  Seen for f/u. Earlier [facility] .  Here with her sister Rosy.  HPI:  Margaret Ramos is a 73 year old female who presents for the evaluation/management of DM.  She typically goes to bed late at night around midnight-2:00 in the morning and wakes up around 10-11:00 in the morning.  Typically has breakfast around noon, afternoon snack of popcorn, supper and typically has bedtime snack every single night.   She tells me that she is not consistent with evening dose of glyburide and is missing metformin in afternoon.  She is worried about hypoglycemia.  He does not like to take medications.  Reports that she will take extra pill of metformin if the blood sugar is high at night but after she has to wake up at night with abdominal pain and cramping if she takes nighttime dose of metformin.  She is taking regular metformin and is resistant to switch to metformin extended release.    1. Type 2 DM:  Orginally diagnosed at the age of: 54 in 11/99. Initially on glucovance, then switched to metformin and glyburide due to formulary issues.  Current Regimen: Metformin 750 mg qam,metformin  qpm and 250  Mg at bedtime if BG > 200 and glyburide 2.5 mg in AM with breakfast and 2.5 mg at supper but is not.  BS checks: bid 202.  Limited blood sugar data available and  Average Meter Download: It is variable.  Blood sugars ranging from 141-334.  No major episodes of hypoglycemia noted or reported.  Meals: breakfast/brunch: around noon, afternoon snack at 3:00- 4:00 AM (popcorn), supper (variable timings).  Not much eating out.    Complications:   Diabetes Complications  Description / Detail    Diabetic Retinopathy  No   CAD / PAD  Yes. S/p stent placement   Neuropathy  No    Nephropathy / Microalbuminuria  No   Gastroparesis  No   Hypoglycemia [place]  No     2. Hypertension: Blood Pressure today:   BP Readings from Last 3 Encounters:   12/18/18 144/60   10/02/18 136/60   09/26/18 149/80     Not on medication.  She refused medications in past.  3. Hyperlipidemia: On medication  PMH/PSH:  Past Medical History:   Diagnosis Date     ASCVD (arteriosclerotic cardiovascular disease) 8/10/2016     Chronic ischemic heart disease 6/13/2016     Hyperparathyroidism (H) 1/4/2018     Hypothyroidism 10/20/2014     Inferior MI (H) 8/10/2016    2016      Mixed hyperlipidemia (DYSLIPIDEMIA) 10/20/2014     Morbid obesity (H) 10/13/2015     S/P drug eluting coronary stent placement 8/10/2016    RCA 6/2016      Type 2 diabetes mellitus with hyperglycemia (H) 10/20/2014       Past Surgical History:   Procedure Laterality Date     APPENDECTOMY      age 7     BACK SURGERY  1978     CARPAL TUNNEL RELEASE RT/LT      bilateral     wisdom teeth extraction       Family Hx:  Family History   Problem Relation Age of Onset     Diabetes Mother         type 2     Diabetes Sister         both sisters have type 2     Coronary Artery Disease Sister      Glaucoma Maternal Aunt      Macular Degeneration No family hx of      Thyroid disease: No         DM2: Yes - sisters,mother         Autoimmune: DM1, SLE, RA, Vitiligo No    Social Hx:  Social History     Socioeconomic History     Marital status: Single     Spouse name: Not on file     Number of children: 0     Years of education: Not on file     Highest education level: Not on file   Social Needs     Financial resource strain: Not on file     Food insecurity - worry: Not on file     Food insecurity - inability: Not on file     Transportation needs - medical: Not on file     Transportation needs - non-medical: Not on file   Occupational History     Not on file   Tobacco Use     Smoking status: Never Smoker     Smokeless tobacco: Never Used   Substance and Sexual  "Activity     Alcohol use: No     Alcohol/week: 0.0 oz     Drug use: No     Sexual activity: Not Currently   Other Topics Concern     Parent/sibling w/ CABG, MI or angioplasty before 65F 55M? No   Social History Narrative     Not on file          MEDICATIONS:  has a current medication list which includes the following prescription(s): ACE NOT PRESCRIBED, INTENTIONAL,, ace/arb/arni not prescribed, albuterol, vitamin c, aspirin, astaxanthin, atorvastatin calcium, blood glucose monitoring, blood glucose monitoring, calcium citrate-vitamin d, calcium-magnesium, carboxymethylcellulose sod pf, cholecalciferol, coq-10, diphenhydramine hcl, glyburide, ketoconazole, levothyroxine, lutein-zeaxanthin-bilberry, magnesium malate, metformin, multiple vitamins-calcium, multivitamin, omega-3 fatty acids, and vitamin k.    ROS     ROS: 10 point ROS neg other than the symptoms noted above in the HPI.    Physical Exam   VS: /60 (BP Location: Right arm, Patient Position: Chair, Cuff Size: Adult Large)   Pulse 83   Temp 97.6  F (36.4  C) (Oral)   Ht 1.594 m (5' 2.75\")   Wt 88.8 kg (195 lb 12.8 oz)   SpO2 99%   Breastfeeding? No   BMI 34.96 kg/m     GENERAL: AXOX3, NAD, well dressed, answering questions appropriately, appears stated age.  HEENT: No exopthalmous, no proptosis, EOMI, no lig lag, no retraction  NEUROLOGY: CN grossly intact, no tremors  PSYCH: normal affect and mood      LABS:  Lab Results   Component Value Date    A1C 8.3 12/11/2018    A1C 8.3 08/10/2018    A1C 7.7 12/28/2017    A1C 7.9 08/22/2017    A1C 7.9 11/29/2016     Lab Results   Component Value Date    UMALCR Unable to calculate due to low value 11/29/2016          ENDO THYROID LABS-[facility] Latest Ref Rng & Units 8/10/2018 12/28/2017   TSH 0.40 - 4.00 mU/L 1.16 1.23   T4 FREE 0.76 - 1.46 ng/dL 1.06    FREE T3 2.3 - 4.2 pg/mL       ENDO THYROID LABS-[facility] Latest Ref Rng & Units 9/12/2017 11/29/2016   TSH 0.40 - 4.00 mU/L 0.86 0.76   T4 FREE 0.76 - 1.46 ng/dL " 1.30 1.21   FREE T3 2.3 - 4.2 pg/mL 2.0 (L)      ENDO THYROID LABS-[facility] Latest Ref Rng & Units 12/3/2015   TSH 0.40 - 4.00 mU/L 0.34 (L)   T4 FREE 0.76 - 1.46 ng/dL 1.21   FREE T3 2.3 - 4.2 pg/mL      ENDO CALCIUM LABS-[facility] Latest Ref Rng & Units 8/10/2018   CALCIUM 8.5 - 10.1 mg/dL 9.8     ENDO CALCIUM LABS-[facility] Latest Ref Rng & Units 8/22/2017 11/29/2016   CALCIUM 8.5 - 10.1 mg/dL 10.1 10.0     ENDO CALCIUM LABS-[facility] Latest Ref Rng & Units 7/26/2016 7/14/2016   CALCIUM 8.5 - 10.1 mg/dL  10.6     ENDO CALCIUM LABS-[facility] Latest Ref Rng & Units 8/10/2018 12/28/2017   PARATHYROID HORMONE INTACT 18 - 80 pg/mL 107 (H) 87 (H)     ENDO CALCIUM LABS-[facility] Latest Ref Rng & Units 9/12/2017 8/22/2017   PARATHYROID HORMONE INTACT 18 - 80 pg/mL  100 (H)     ENDO CALCIUM LABS-[facility] Latest Ref Rng & Units 11/29/2016   PARATHYROID HORMONE INTACT 18 - 80 pg/mL 100 (H)     Vitamin D Deficiency Screening Results:  Lab Results   Component Value Date    VITDT 50 12/11/2018    VITDT 53 08/10/2018    VITDT 42 12/28/2017    VITDT 48 09/12/2017    VITDT 41 11/29/2016       All pertinent notes, labs, and images personally reviewed by me.     A/P  Ms.Alice LEONARD Ramos is a 73 year old here for the evaluation/management of diabetes:    1. DM2 -under poor control. A1c 8.3%.  Diabetes complicated by CAD status post stent placement.  Dietary indiscretion playing a role in worsening control.  She is not consistent with medication and missing glyburide (especially evening dose ) many times.  She is resistant to go up on metformin or trial of extended release metformin   plan:  Take metformin 1.5 mg (750 mg) in AM and 1.5 mg with popcorn.  Try to increase the dose to 1000 mg twice daily if tolerated.  Take glyburide 2.5 mg twice a day with meals.  Be consistent with it.  She needs to have specific schedule about meal timings and carb content with meals  Recommend diagnostic CGM.  CDE referral in place.  Labs and follow-up in 3 months.    Cut down on potatoes With  breakfast  Decrease quantity of popcorn/afternoon snack  Be consistent with medications and meals.    Recommend checking blood sugars before meals and at bedtime.    If Blood glucose are low more often-> 2-3 times/week- give us a call.  The patient is advised to Make better food choices: reduce carbs, Reduce portion size, weight loss and exercise 3-4 times a week.  Discussed hypoglycemia signs and symptoms as well as management in detail.     2. Hypertension -under good control.  Previously ACE discussed, pt is reluctant to start.     3. Hyperlipidemia - Under Fair control.  Taking atorvastatin 40 mg.    4. Prevention  Dental-Yes  ASA-yes  Smoking- No    5. Hypothyroidism  On levoT 112 mcg qd  with 1/2 tab on Sundays.  Continue current dose of levothyroxine.  Recent labs in normal range.  Plan:  Continue levothyroxine at current dose 112 mcg/day for 6 days a week and 56 mcg/day for 1 day a week.  Clinically looks euthyroid.  Labs in 3  months    7.  Hyperparathyroidism :  Mildly elevated PTH at 100 c/w possible early primary hyperparathyroidism.  Calcium normal.  Vitamin D normal.  Kidney function normal  DEXA showing osteoporosis. Not on medication at this time.  Plan- continue to monitor at this time in the setting of stable PTH, normal calcium levels.    Repeat labs in 3-6 months.  Consider parathyroid scan based on that.  Continue vitamin D replacement.    8.  Hypercalcemia (new):  Noted on labs done in December 2018  She is telling me that she was not drinking much water around this time  PTH is in normal range  Plan:  In the setting of asymptomatic nature plan to continue to monitor and close follow-up.  Labs in 3 months.  Encourage good hydration.    9. Osteoporosis:  Discussed DEXA scan showing osteoporosis  Discussed treatment options including Fosamax.  At this time she would like to concentrate on calcium and vitamin D supplement and is not ready for medication  Risk of fractures and complications  associated with osteoporosis was discussed with patient and she understands.  The pt was advised to    Maintain an adequate calcium and vitamin D intake and to supplement vitamin D if needed to maintain serum levels of 25 hydroxy D (25 OH D) between 30-60 ng/ml.    Limit alcohol intake to no more than 2 servings per day.    Limit caffeine intake.    Maintain an active lifestyle including weight-bearing exercises for at least 30 mins daily.    Take measures to reduce the risk of falling.      All questions were answered.  The patient indicates understanding of the above issues and agrees with the plan set forth.       More than 50% of face to face time spent with Ms. Ramos on counseling / coordinating her care.  Total face to face time was 45 minutes.      Follow-up:  Follow up in 3 months.    [place]  CC: Jossie Luciano MD    Again, thank you for allowing me to participate in the care of your patient.        Sincerely,        Rosario Shannon MD

## 2018-12-20 ENCOUNTER — TELEPHONE (OUTPATIENT)
Dept: ENDOCRINOLOGY | Facility: CLINIC | Age: 73
End: 2018-12-20

## 2018-12-20 NOTE — TELEPHONE ENCOUNTER
Patient states that at most recent OV w/ Dr. Shannon they were looking at a brochure for monitoring systems.  She was supposed to receive a copy of this brochure but forgot to ask for it when the building was evacuated.      Please call patient or send brochure via mail.

## 2018-12-20 NOTE — TELEPHONE ENCOUNTER
Please check with diabetic educator if they can have a copy of personal freestyle diane monitor information as well as diagnostic diane monitor information--patient on diet and placement with the patient.

## 2018-12-27 NOTE — TELEPHONE ENCOUNTER
"Spoke to patient, briefly discussed the Toshia Pro and the sensor for home use.     Reminded we have an order from Dr. Shannon recommending that she wear the Toshia Pro for diagnostic reasons, she may also be interested in the Toshia for home.     Margaret voices that \"it is just too much\", she is focusing on her osteoporosis and foods.   She says diabetes will just have to go with the flow.     Reviewed that A1C is elevated, want her to feel her best. Encouraged to come in for Toshia Pro to fine tune the diabetes meds- amount, timing, type, etc. She is reluctant.    Agreeable to reading through the Toshia Pro brochure, I will put one in the mail. Not interested in learning about the Toshia for home at this time.     Sarah Henley RD, LD, CDE    "

## 2019-01-04 ENCOUNTER — OFFICE VISIT (OUTPATIENT)
Dept: PEDIATRICS | Facility: CLINIC | Age: 74
End: 2019-01-04
Payer: MEDICARE

## 2019-01-04 VITALS
SYSTOLIC BLOOD PRESSURE: 120 MMHG | BODY MASS INDEX: 34.12 KG/M2 | OXYGEN SATURATION: 96 % | HEART RATE: 98 BPM | WEIGHT: 191.1 LBS | TEMPERATURE: 100.7 F | DIASTOLIC BLOOD PRESSURE: 64 MMHG

## 2019-01-04 DIAGNOSIS — E11.65 TYPE 2 DIABETES MELLITUS WITH HYPERGLYCEMIA, WITHOUT LONG-TERM CURRENT USE OF INSULIN (H): ICD-10-CM

## 2019-01-04 DIAGNOSIS — J20.9 ACUTE BRONCHITIS WITH SYMPTOMS > 10 DAYS: Primary | ICD-10-CM

## 2019-01-04 DIAGNOSIS — I25.10 ASCVD (ARTERIOSCLEROTIC CARDIOVASCULAR DISEASE): ICD-10-CM

## 2019-01-04 PROCEDURE — 99214 OFFICE O/P EST MOD 30 MIN: CPT | Performed by: INTERNAL MEDICINE

## 2019-01-04 RX ORDER — AZITHROMYCIN 250 MG/1
TABLET, FILM COATED ORAL
Qty: 6 TABLET | Refills: 0 | Status: SHIPPED | OUTPATIENT
Start: 2019-01-04 | End: 2019-04-30 | Stop reason: SINTOL

## 2019-01-04 NOTE — PROGRESS NOTES
SUBJECTIVE:   Margaret Ramos is a 73 year old female who presents to clinic today for the following health issues:    RESPIRATORY SYMPTOMS      Duration: 10 days    Description  rhinorrhea, cough, wheezing, ear pain bilateral, headache, pain across forehead, fatigue/malaise and chest tightness     Severity: severe    Accompanying signs and symptoms: dizziness, decrease appetite, blood sugars at 200    History (predisposing factors):  asthma    Precipitating or alleviating factors: None    Therapies tried and outcome:  rest and fluids guaifenesin, halls, warm water, neb once today     72 y/o F with history of DM, CAD, morbid obesity who presents with 10 days of cough. Started with dizziness, then chest pain/upper back pain. Then started to get post-nasal drip with some tonsillar exudate. Then developed sore throat. Started to get chest congestion, phlegm. Has now turned green and chunky. Drinking water - but has to be hot to help the throat. Has had decreased appetite. Sugars have been high, so taking metformin without really eating. Steam and hot water helps more than the albuterol. Sister with similar symptoms before [place]. Sister was tested for flu and had negative CXR. She was treated with a z-pack and hasn't improved. Cough is her most bothersome symptom.    Reviewed and updated as needed this visit by clinical staff  Tobacco  Allergies  Meds  Problems  Med Hx  Surg Hx  Fam Hx       Reviewed and updated as needed this visit by Provider  Tobacco  Allergies  Meds  Problems  Med Hx  Surg Hx  Fam Hx       -------------------------------------    ROS:  Constitutional, HEENT, cardiovascular, pulmonary, gi and gu systems are negative, except as otherwise noted.    OBJECTIVE:                                                    /64   Pulse 98   Temp 100.7  F (38.2  C) (Oral)   Wt 86.7 kg (191 lb 1.6 oz)   SpO2 96%   BMI 34.12 kg/m     Body mass index is 34.12 kg/m .  General Appearance: tired  appearing, alert and no distress  Eyes:   no discharge, erythema.  Normal pupils.  Both Ears: normal: no effusions, no erythema, normal landmarks  Nose: mucosal injection, mucosal edema and congested  Sinuses:  frontal tenderness in center  Oropharynx: copious clear PND  Neck: Supple.  No adenopathy, no asymmetry  Respiratory: lungs clear to auscultation - no rales, rhonchi or wheezes. Frequent cough  Cardiovascular: regular rate and rhythm, normal S1 S2, no S3 or S4 and no murmur, click or rub.  No peripheral edema.  Skin: no rashes or lesions.  Well perfused and normal turgor.    Diagnostic Test Results:  none      ASSESSMENT/PLAN:                                                      (J20.9) Acute bronchitis with symptoms > 10 days  (primary encounter diagnosis)  Comment: given underlying diabetes, CAD and productive cough with low grade fever, concern for bacterial infection. Non-focal lung exam.   Plan: azithromycin (ZITHROMAX) 250 MG tablet  - will cover with azithro  - albuterol as needed  - f/u if worsening or not improving    (I25.10) ASCVD (arteriosclerotic cardiovascular disease)  (E11.65) Type 2 diabetes mellitus with hyperglycemia, without long-term current use of insulin (H)  Comment: putting at higher risk for complication    FUTURE APPOINTMENTS:       - Follow-up visit in 1 week if not improving, sooner if worsening symptoms    [facility]

## 2019-01-04 NOTE — PATIENT INSTRUCTIONS
1. Azithromycin 2 pills today, then 1 pill once a day for next 4 days  2. Continue with steam, nebulizer  3. Follow-up if not improving in next week

## 2019-01-07 ENCOUNTER — TELEPHONE (OUTPATIENT)
Dept: PEDIATRICS | Facility: CLINIC | Age: 74
End: 2019-01-07

## 2019-01-07 NOTE — TELEPHONE ENCOUNTER
Patient prescribed azithromycin last week. Received fax from [facility] about possible adverse reaction to macrolides in past. Patient has reported erythromycin allergy in our chart, but she had told me she tolerated azithromycin in the past.    Please call patient and verify she has tolerated azithro and would like to get this filled. If so, please notify [facility] (on [address]). She is allergy to pretty much every class of antibiotics, so do not have many other options.    Thanks,  Helen Gandhi MD  Internal Medicine/Pediatrics

## 2019-01-07 NOTE — TELEPHONE ENCOUNTER
I spoke with patient and she has taken Zpack in the past without any side effects.  She has been taking the Zpack and only has two pills left..   Has not had any side effects from this medication.  Patient doesn't take two tabs at one time with the initial dose,  So she takes one tablet and then follows with a second tablet about 8 hours later and then takes one tablet daily.  Has not noted a big improvement in symptoms yet.  Will call Thursday with an update if no improvement in symptoms.  Also notified pharmacy of this.  ANT Merritt RN

## 2019-01-10 ENCOUNTER — OFFICE VISIT (OUTPATIENT)
Dept: URGENT CARE | Facility: URGENT CARE | Age: 74
End: 2019-01-10
Payer: MEDICARE

## 2019-01-10 VITALS
DIASTOLIC BLOOD PRESSURE: 60 MMHG | HEART RATE: 69 BPM | TEMPERATURE: 97.5 F | OXYGEN SATURATION: 99 % | SYSTOLIC BLOOD PRESSURE: 122 MMHG

## 2019-01-10 DIAGNOSIS — J02.9 ACUTE PHARYNGITIS, UNSPECIFIED ETIOLOGY: Primary | ICD-10-CM

## 2019-01-10 LAB
DEPRECATED S PYO AG THROAT QL EIA: NORMAL
SPECIMEN SOURCE: NORMAL

## 2019-01-10 PROCEDURE — 87880 STREP A ASSAY W/OPTIC: CPT | Performed by: PEDIATRICS

## 2019-01-10 PROCEDURE — 87081 CULTURE SCREEN ONLY: CPT | Performed by: PEDIATRICS

## 2019-01-10 PROCEDURE — 99213 OFFICE O/P EST LOW 20 MIN: CPT | Performed by: PEDIATRICS

## 2019-01-10 NOTE — PROGRESS NOTES
SUBJECTIVE:  Margaret Ramos is a 73 year old female with a chief complaint of sore throat.  Onset of symptoms was 2 week(s) ago.    Course of illness: still present.  Severity moderate  Current and Associated symptoms: runny nose, stuffy nose, cough - non-productive, sore throat and fatigue  Treatment measures tried include antibiotic (azithromycin).  Predisposing factors include Diabetes, heart disease, age.    Past Medical History:   Diagnosis Date     ASCVD (arteriosclerotic cardiovascular disease) 8/10/2016     Chronic ischemic heart disease 6/13/2016     Hyperparathyroidism (H) 1/4/2018     Hypothyroidism 10/20/2014     Inferior MI (H) 8/10/2016    2016      Mixed hyperlipidemia (DYSLIPIDEMIA) 10/20/2014     Morbid obesity (H) 10/13/2015     S/P drug eluting coronary stent placement 8/10/2016    RCA 6/2016      Type 2 diabetes mellitus with hyperglycemia (H) 10/20/2014     Current Outpatient Medications   Medication Sig Dispense Refill     ACE NOT PRESCRIBED, INTENTIONAL, ACE Inhibitor not prescribed due to Refusal by patient       ACE/ARB NOT PRESCRIBED, INTENTIONAL, ACE & ARB not prescribed due to Symptomatic hypotension not due to excessive diuresis       albuterol (2.5 MG/3ML) 0.083% neb solution Take 1 vial (2.5 mg) by nebulization every 6 hours as needed for shortness of breath / dyspnea or wheezing 360 mL 3     ascorbic acid (VITAMIN C) 500 MG tablet Take 1 tablet by mouth       aspirin 81 MG EC tablet Take 1 tablet (81 mg) by mouth daily 90 tablet 3     Astaxanthin 4 MG CAPS Take 1 tablet by mouth daily       ATORVASTATIN CALCIUM PO Take 40 mg by mouth daily       blood glucose monitoring (ACCU-CHEK JINA PLUS) test strip Use as directed up to 6 times daily 400 each 11     blood glucose monitoring (SOFTCLIX) lancets Use as directed upto 6 times daily 400 each 11     calcium citrate-vitamin D (CITRACAL) 315-250 MG-UNIT TABS per tablet Take 1 tablet by mouth daily       calcium-magnesium (CALMAG) 500-250 MG  TABS per tablet Take 1 tablet by mouth daily Calcium Magnesium Intensive Care: Each Tablet: 15 mg Vit C, 125 international units  VIt D, 250 mg Calcium carbonate, 100 mg Mg       Carboxymethylcellulose Sod PF (REFRESH PLUS) 0.5 % SOLN ophthalmic solution 1 drop daily as needed for dry eyes       Cholecalciferol (VITAMIN D3 PO) Take by mouth 2 times daily        Coenzyme Q10 (COQ-10) 200 MG CAPS Take 200 mg by mouth daily       DiphenhydrAMINE HCl (BENADRYL PO) Take 6.25 mg by mouth as needed       glyBURIDE (DIABETA /MICRONASE) 2.5 MG tablet Take 1 tablet (2.5 mg) by mouth 2 times daily (with meals) 270 tablet 3     ketoconazole (NIZORAL) 2 % cream Apply topically 2 times daily 60 g 1     levothyroxine (SYNTHROID/LEVOTHROID) 112 MCG tablet Take 1 tablet (112 mcg) by mouth daily Take 1/2 tab on Sundays 90 tablet 3     LUTEIN-ZEAXANTHIN-BILBERRY PO Take 1 capsule by mouth 2 times daily 10 mg lutein, 5 mg zeaxanthin, 2000 international, Vit A, 200 mg VIt C, 60 Vit E, Bioflavonoid complex, green tea, epigallocatechin gallate, grape seed, black elder extract       MAGNESIUM MALATE PO Take 0.5 tablets by mouth 2 times daily       metFORMIN (GLUCOPHAGE) 500 MG tablet Take 1 tablet (500 mg) by mouth 3 times daily (with meals) Take 1 and 1/2 tab qam, 1/2 tab q lunch and 1/2 tab q pm 360 tablet 3     Multiple Vitamins-Calcium (ESSENTIAL ONE DAILY MULTIVIT PO) Take 1 capsule by mouth daily       Multiple Vitamins-Minerals (CENTRUM SILVER) per tablet daily (every 24 hours).       Omega-3 Fatty Acids (OMEGA ESSENTIALS BASIC PO) Take 1 capsule by mouth daily       VITAMIN K PO Take 300 mcg by mouth daily       Social History     Tobacco Use     Smoking status: Never Smoker     Smokeless tobacco: Never Used   Substance Use Topics     Alcohol use: No     Alcohol/week: 0.0 oz       ROS:  CONSTITUTIONAL:NEGATIVE for fever, chills, change in weight  INTEGUMENTARY/SKIN: POSITIVE for rash on lateral breasts and nape of the neck  EYES:  NEGATIVE for vision changes or irritation  CV: NEGATIVE for chest pain, palpitations or peripheral edema  GI: NEGATIVE for nausea, abdominal pain, heartburn, or change in bowel habits    OBJECTIVE:   /60 (BP Location: Right arm, Patient Position: Chair, Cuff Size: Adult Large)   Pulse 69   Temp 97.5  F (36.4  C) (Tympanic)   SpO2 99%   GENERAL APPEARANCE: healthy, alert and no distress  EYES: EOMI,  PERRL, conjunctiva clear  HENT: ear canals and TM's normal.  Nose normal.  Pharynx erythematous with no exudate  NECK: supple, non-tender to palpation, no adenopathy noted  RESP: lungs clear to auscultation - no rales, rhonchi or wheezes  SKIN: blanching erythematous blotchy macules on lateral breasts and mildly on nape of the neck    Rapid Strep test is negative; await throat culture results.    ASSESSMENT:   Acute pharyngitis, unspecified etiology  Hives    PLAN:   See orders in epic.     Symptomatic treat with gargles, lozenges, and OTC analgesic as needed.     Treat rash with benadryl. May have allergy to azithromycin but appears very mild and bronchitis is improving.    Follow-up with primary clinic if not improving.

## 2019-01-11 LAB
BACTERIA SPEC CULT: NORMAL
SPECIMEN SOURCE: NORMAL

## 2019-03-12 ENCOUNTER — TELEPHONE (OUTPATIENT)
Dept: ENDOCRINOLOGY | Facility: CLINIC | Age: 74
End: 2019-03-12

## 2019-03-12 NOTE — LETTER
[facility] [place]  [address], [address]  [place], [place]  [address]                                            TEL:[phone]  FAX:[phone]      Margaret Ramos  [address] [place] [address] [place] [address]      March 12, 2019    Dear Margaret       This letter is being sent on behalf of Dr. Shannon. It is to remind you that your provider expected you to return for a follow up clinic visit. Please make a lab only appointment, and then follow up with a clinic visit one week afterwards to review those results. If this is not done, it may result in your provider not being able to refill your medications.     You may call our office at [phone] ([facility]) or [phone] ([place]) to schedule an appointment. You may also see Cha Santos in [place] at [phone]    If you have already scheduled these appointments, please disregard this notice.      Sincerely,    [facility],  Dr. Rosario Shannon

## 2019-03-12 NOTE — TELEPHONE ENCOUNTER
Panel Management Review      Patient has the following on her problem list:     Diabetes    ASA: Passed    Last A1C  Lab Results   Component Value Date    A1C 8.3 12/11/2018    A1C 8.3 08/10/2018    A1C 7.7 12/28/2017    A1C 7.9 08/22/2017    A1C 7.9 11/29/2016     A1C tested: FAILED    Last LDL:    Lab Results   Component Value Date    CHOL 205 08/10/2018     Lab Results   Component Value Date    HDL 57 08/10/2018     Lab Results   Component Value Date     08/10/2018     Lab Results   Component Value Date    TRIG 124 08/10/2018     Lab Results   Component Value Date    CHOLHDLRATIO 3.7 04/30/2015     Lab Results   Component Value Date    NHDL 148 08/10/2018       Is the patient on a Statin? YES             Is the patient on Aspirin? YES    Medications     HMG CoA Reductase Inhibitors     ATORVASTATIN CALCIUM PO       Salicylates     aspirin 81 MG EC tablet             Last three blood pressure readings:  BP Readings from Last 3 Encounters:   01/10/19 122/60   01/04/19 120/64   12/18/18 144/60       Date of last diabetes office visit: 12/18/18     Tobacco History:     History   Smoking Status     Never Smoker   Smokeless Tobacco     Never Used           Composite cancer screening  Chart review shows that this patient is due/due soon for the following ColonoscopyColonoscopy  Summary:    Patient is due/failing the following:   FOLLOW UP    Action needed:   Patient needs office visit for endo.    Type of outreach:    Sent letter.    Questions for provider review:    None                                                                                                                                    [facility]  [facility]       Chart routed to no one .

## 2019-04-08 ENCOUNTER — TELEPHONE (OUTPATIENT)
Dept: ENDOCRINOLOGY | Facility: CLINIC | Age: 74
End: 2019-04-08

## 2019-04-08 NOTE — TELEPHONE ENCOUNTER
Fax from insurance asking if patient has tried glipizide or glimepiride? Please advise as these are not on past medication list. Can patient be on wither of these medications instead of glyburide?

## 2019-04-09 NOTE — TELEPHONE ENCOUNTER
Received form from  on Glyburide.    Form was faxed, copied, sent to scanning.      Rosie Francis, DELIA  [facility]  [facility]

## 2019-04-17 ENCOUNTER — DOCUMENTATION ONLY (OUTPATIENT)
Dept: PHARMACY | Facility: CLINIC | Age: 74
End: 2019-04-17

## 2019-04-17 DIAGNOSIS — E21.3 HYPERPARATHYROIDISM (H): ICD-10-CM

## 2019-04-17 DIAGNOSIS — E11.65 TYPE 2 DIABETES MELLITUS WITH HYPERGLYCEMIA, WITHOUT LONG-TERM CURRENT USE OF INSULIN (H): ICD-10-CM

## 2019-04-17 DIAGNOSIS — E03.8 OTHER SPECIFIED HYPOTHYROIDISM: ICD-10-CM

## 2019-04-17 DIAGNOSIS — E83.52 HYPERCALCEMIA: ICD-10-CM

## 2019-04-17 LAB
CA-I SERPL ISE-MCNC: 5.4 MG/DL (ref 4.4–5.2)
HBA1C MFR BLD: 8.5 % (ref 0–5.6)
PTH-INTACT SERPL-MCNC: 83 PG/ML (ref 18–80)

## 2019-04-17 PROCEDURE — 83735 ASSAY OF MAGNESIUM: CPT | Performed by: INTERNAL MEDICINE

## 2019-04-17 PROCEDURE — 82310 ASSAY OF CALCIUM: CPT | Performed by: INTERNAL MEDICINE

## 2019-04-17 PROCEDURE — 82306 VITAMIN D 25 HYDROXY: CPT | Performed by: INTERNAL MEDICINE

## 2019-04-17 PROCEDURE — 83036 HEMOGLOBIN GLYCOSYLATED A1C: CPT | Performed by: INTERNAL MEDICINE

## 2019-04-17 PROCEDURE — 82330 ASSAY OF CALCIUM: CPT | Performed by: INTERNAL MEDICINE

## 2019-04-17 PROCEDURE — 36415 COLL VENOUS BLD VENIPUNCTURE: CPT | Performed by: INTERNAL MEDICINE

## 2019-04-17 PROCEDURE — 82043 UR ALBUMIN QUANTITATIVE: CPT | Performed by: INTERNAL MEDICINE

## 2019-04-17 PROCEDURE — 84439 ASSAY OF FREE THYROXINE: CPT | Performed by: INTERNAL MEDICINE

## 2019-04-17 PROCEDURE — 84443 ASSAY THYROID STIM HORMONE: CPT | Performed by: INTERNAL MEDICINE

## 2019-04-17 PROCEDURE — 83970 ASSAY OF PARATHORMONE: CPT | Performed by: INTERNAL MEDICINE

## 2019-04-17 PROCEDURE — 82565 ASSAY OF CREATININE: CPT | Performed by: INTERNAL MEDICINE

## 2019-04-17 PROCEDURE — 84100 ASSAY OF PHOSPHORUS: CPT | Performed by: INTERNAL MEDICINE

## 2019-04-17 NOTE — PROGRESS NOTES
We have attempted to contact this patient three times to set up a MTM follow up appointment and were unsuccessful. We will no longer continue to contact this patient to schedule a visit at this time. Please refer back to MTM if you believe this patient would continue to benefit from our services.     Thank you!    Kim Winkler, PharmD [facility]  Medication Therapy Management Practitioner   #[phone]

## 2019-04-18 LAB
CALCIUM SERPL-MCNC: 10.1 MG/DL (ref 8.5–10.1)
CREAT SERPL-MCNC: 0.74 MG/DL (ref 0.52–1.04)
CREAT UR-MCNC: 62 MG/DL
DEPRECATED CALCIDIOL+CALCIFEROL SERPL-MC: 57 UG/L (ref 20–75)
GFR SERPL CREATININE-BSD FRML MDRD: 79 ML/MIN/{1.73_M2}
MAGNESIUM SERPL-MCNC: 2.2 MG/DL (ref 1.6–2.3)
MICROALBUMIN UR-MCNC: 8 MG/L
MICROALBUMIN/CREAT UR: 12.13 MG/G CR (ref 0–25)
PHOSPHATE SERPL-MCNC: 3 MG/DL (ref 2.5–4.5)
T4 FREE SERPL-MCNC: 1.15 NG/DL (ref 0.76–1.46)
TSH SERPL DL<=0.005 MIU/L-ACNC: 0.91 MU/L (ref 0.4–4)

## 2019-04-30 ENCOUNTER — OFFICE VISIT (OUTPATIENT)
Dept: ENDOCRINOLOGY | Facility: CLINIC | Age: 74
End: 2019-04-30
Payer: MEDICARE

## 2019-04-30 VITALS
WEIGHT: 195 LBS | DIASTOLIC BLOOD PRESSURE: 62 MMHG | OXYGEN SATURATION: 98 % | BODY MASS INDEX: 34.55 KG/M2 | HEIGHT: 63 IN | SYSTOLIC BLOOD PRESSURE: 156 MMHG | HEART RATE: 84 BPM | TEMPERATURE: 97.6 F

## 2019-04-30 DIAGNOSIS — E78.2 MIXED HYPERLIPIDEMIA: ICD-10-CM

## 2019-04-30 DIAGNOSIS — E11.9 TYPE 2 DIABETES MELLITUS WITHOUT COMPLICATION, WITHOUT LONG-TERM CURRENT USE OF INSULIN (H): ICD-10-CM

## 2019-04-30 DIAGNOSIS — E03.8 OTHER SPECIFIED HYPOTHYROIDISM: ICD-10-CM

## 2019-04-30 DIAGNOSIS — E11.65 TYPE 2 DIABETES MELLITUS WITH HYPERGLYCEMIA, WITHOUT LONG-TERM CURRENT USE OF INSULIN (H): Primary | ICD-10-CM

## 2019-04-30 PROCEDURE — 99215 OFFICE O/P EST HI 40 MIN: CPT | Performed by: INTERNAL MEDICINE

## 2019-04-30 RX ORDER — METFORMIN HCL 500 MG
500 TABLET, EXTENDED RELEASE 24 HR ORAL DAILY
Qty: 180 TABLET | Refills: 1 | Status: SHIPPED | OUTPATIENT
Start: 2019-04-30 | End: 2020-01-14

## 2019-04-30 RX ORDER — LANCETS
EACH MISCELLANEOUS
Qty: 300 EACH | Refills: 11 | Status: SHIPPED | OUTPATIENT
Start: 2019-04-30

## 2019-04-30 RX ORDER — GLYBURIDE 2.5 MG/1
2.5 TABLET ORAL 2 TIMES DAILY WITH MEALS
Qty: 270 TABLET | Refills: 3 | Status: SHIPPED | OUTPATIENT
Start: 2019-04-30 | End: 2021-02-25

## 2019-04-30 ASSESSMENT — MIFFLIN-ST. JEOR: SCORE: 1349.67

## 2019-04-30 NOTE — PROGRESS NOTES
Name: Margaret Ramos  Seen for f/u. Earlier TCO .  Here with her sister Rosy.  HPI:  Margaret Ramos is a 73 year old female who presents for the evaluation/management of DM.  She typically goes to bed late at night around midnight-2:00 in the morning and wakes up around 10-11:00 in the morning.  Typically has breakfast around noon, afternoon snack of popcorn, supper and typically has bedtime snack every single night.   She is worried about hypoglycemia.  She does not like to take medications.  Reports that she will take extra pill of metformin if the blood sugar is high at night but after she has to wake up at night with abdominal pain and cramping if she takes nighttime dose of metformin.      1. Type 2 DM:  Orginally diagnosed at the age of: 54 in 11/99. Initially on glucovance, then switched to metformin and glyburide due to formulary issues.  Current Regimen: Metformin 750 mg qam, 500 qpm and (plus 250  Mg at bedtime if BG > 200)   and glyburide 2.5 mg in AM with breakfast and 1.25 mg at supper  ( plus more 1.25 at night if BG if BG are high > 200)  yes:     Diabetes Medication(s)     Biguanides       metFORMIN (GLUCOPHAGE) 500 MG tablet    Take 1 tablet (500 mg) by mouth 3 times daily (with meals) Take 1 and 1/2 tab qam, 1/2 tab q lunch and 1/2 tab q pm    Sulfonylureas       glyBURIDE (DIABETA /MICRONASE) 2.5 MG tablet    Take 1 tablet (2.5 mg) by mouth 2 times daily (with meals)        BS checks: bid 201.    Average Meter Download: It is variable.  Blood sugars ranging from 109-431.  No major episodes of hypoglycemia noted or reported.  Meals: breakfast/brunch: around noon, afternoon snack at 3:00- 4:00 AM (popcorn), supper (variable timings).  Not much eating out.    Complications:   Diabetes Complications  Description / Detail    Diabetic Retinopathy  No   CAD / PAD  Yes. S/p stent placement   Neuropathy  No   Nephropathy / Microalbuminuria  No   Gastroparesis  No   Hypoglycemia [place]  No     2.  Hypertension: Blood Pressure today:   BP Readings from Last 3 Encounters:   01/10/19 122/60   01/04/19 120/64   12/18/18 144/60     Not on medication.  She refused medications in past.  3. Hyperlipidemia: On medication  PMH/PSH:  Past Medical History:   Diagnosis Date     ASCVD (arteriosclerotic cardiovascular disease) 8/10/2016     Chronic ischemic heart disease 6/13/2016     Hyperparathyroidism (H) 1/4/2018     Hypothyroidism 10/20/2014     Inferior MI (H) 8/10/2016    2016      Mixed hyperlipidemia (DYSLIPIDEMIA) 10/20/2014     Morbid obesity (H) 10/13/2015     S/P drug eluting coronary stent placement 8/10/2016    RCA 6/2016      Type 2 diabetes mellitus with hyperglycemia (H) 10/20/2014       Past Surgical History:   Procedure Laterality Date     APPENDECTOMY      age 7     BACK SURGERY  1978     CARPAL TUNNEL RELEASE RT/LT      bilateral     wisdom teeth extraction       Family Hx:  Family History   Problem Relation Age of Onset     Diabetes Mother         type 2     Diabetes Sister         both sisters have type 2     Coronary Artery Disease Sister      Glaucoma Maternal Aunt      Macular Degeneration No family hx of      Thyroid disease: No         DM2: Yes - sisters,mother         Autoimmune: DM1, SLE, RA, Vitiligo No    Social Hx:  Social History     Socioeconomic History     Marital status: Single     Spouse name: Not on file     Number of children: 0     Years of education: Not on file     Highest education level: Not on file   Occupational History     Not on file   Social Needs     Financial resource strain: Not on file     Food insecurity:     Worry: Not on file     Inability: Not on file     Transportation needs:     Medical: Not on file     Non-medical: Not on file   Tobacco Use     Smoking status: Never Smoker     Smokeless tobacco: Never Used   Substance and Sexual Activity     Alcohol use: No     Alcohol/week: 0.0 oz     Drug use: No     Sexual activity: Not Currently   Lifestyle     Physical  "activity:     Days per week: Not on file     Minutes per session: Not on file     Stress: Not on file   Relationships     Social connections:     Talks on phone: Not on file     Gets together: Not on file     Attends Buddhist service: Not on file     Active member of club or organization: Not on file     Attends meetings of clubs or organizations: Not on file     Relationship status: Not on file     Intimate partner violence:     Fear of current or ex partner: Not on file     Emotionally abused: Not on file     Physically abused: Not on file     Forced sexual activity: Not on file   Other Topics Concern     Parent/sibling w/ CABG, MI or angioplasty before 65F 55M? No   Social History Narrative     Not on file          MEDICATIONS:  has a current medication list which includes the following prescription(s): ACE NOT PRESCRIBED, INTENTIONAL,, ace/arb/arni not prescribed, albuterol, vitamin c, aspirin, astaxanthin, atorvastatin calcium, blood glucose, blood glucose monitoring, calcium citrate-vitamin d, calcium-magnesium, carboxymethylcellulose pf, cholecalciferol, coq-10, diphenhydramine hcl, glyburide, ketoconazole, levothyroxine, lutein-zeaxanthin-bilberry, magnesium malate, metformin, multiple vitamins-calcium, multivitamin, omega-3 fatty acids, and vitamin k.    ROS     ROS: 10 point ROS neg other than the symptoms noted above in the HPI.    Physical Exam   VS: /62 (BP Location: Right arm, Patient Position: Chair, Cuff Size: Adult Large)   Pulse 84   Temp 97.6  F (36.4  C) (Oral)   Ht 1.594 m (5' 2.75\")   Wt 88.5 kg (195 lb)   SpO2 98%   Breastfeeding? No   BMI 34.82 kg/m    GENERAL: AXOX3, NAD, well dressed, answering questions appropriately, appears stated age.  HEENT: No exopthalmous, no proptosis, EOMI, no lig lag, no retraction  NEUROLOGY: CN grossly intact, no tremors  PSYCH: normal affect and mood    LABS:  Lab Results   Component Value Date    A1C 8.5 04/17/2019    A1C 8.3 12/11/2018    A1C " 8.3 08/10/2018    A1C 7.7 12/28/2017    A1C 7.9 08/22/2017       Component Value Date    UMALCR Unable to calculate due to low value 11/29/2016          ENDO THYROID LABS-UMP Latest Ref Rng & Units 4/17/2019   TSH 0.40 - 4.00 mU/L 0.91   T4 FREE 0.76 - 1.46 ng/dL 1.15     ENDO THYROID LABS-UMP Latest Ref Rng & Units 8/10/2018 12/28/2017   TSH 0.40 - 4.00 mU/L 1.16 1.23   T4 FREE 0.76 - 1.46 ng/dL 1.06    FREE T3 2.3 - 4.2 pg/mL       ENDO THYROID LABS-[facility] Latest Ref Rng & Units 9/12/2017 11/29/2016   TSH 0.40 - 4.00 mU/L 0.86 0.76   T4 FREE 0.76 - 1.46 ng/dL 1.30 1.21   FREE T3 2.3 - 4.2 pg/mL 2.0 (L)      ENDO THYROID LABS-UMP Latest Ref Rng & Units 12/3/2015   TSH 0.40 - 4.00 mU/L 0.34 (L)   T4 FREE 0.76 - 1.46 ng/dL 1.21   FREE T3 2.3 - 4.2 pg/mL      ENDO CALCIUM LABS-UMP Latest Ref Rng & Units 8/10/2018   CALCIUM 8.5 - 10.1 mg/dL 9.8     ENDO CALCIUM LABS-UMP Latest Ref Rng & Units 8/22/2017 11/29/2016   CALCIUM 8.5 - 10.1 mg/dL 10.1 10.0     ENDO CALCIUM LABS-UMP Latest Ref Rng & Units 7/26/2016 7/14/2016   CALCIUM 8.5 - 10.1 mg/dL  10.6     ENDO CALCIUM LABS-UMP Latest Ref Rng & Units 8/10/2018 12/28/2017   PARATHYROID HORMONE INTACT 18 - 80 pg/mL 107 (H) 87 (H)     ENDO CALCIUM LABS-UMP Latest Ref Rng & Units 9/12/2017 8/22/2017   PARATHYROID HORMONE INTACT 18 - 80 pg/mL  100 (H)     ENDO CALCIUM LABS-UMP Latest Ref Rng & Units 11/29/2016   PARATHYROID HORMONE INTACT 18 - 80 pg/mL 100 (H)     Vitamin D Deficiency Screening Results:  Lab Results   Component Value Date    VITDT 57 04/17/2019    VITDT 50 12/11/2018    VITDT 53 08/10/2018    VITDT 42 12/28/2017    VITDT 48 09/12/2017       All pertinent notes, labs, and images personally reviewed by me.     A/P  Ms.Alice LEONARD Ramos is a 74 year old here for the evaluation/management of diabetes:    1. DM2 -under poor control. A1c 8.5%. Diabetes complicated by CAD status post stent placement.  Dietary indiscretion playing a role in worsening control.  She is not  consistent with medication and taking metformin and glyburide at night based on BG.  Worried about hypoglycemia.  No major episodes of hypoglycemia noted/reported.   Plan:  Increase metformin in AM.  Take regular metformin 500 mg with breakfast, evening snack and bedtime snack. This is in addition to metformin  mg in AM  (start metformin  mg in AM)  Continue glipizide at current dose.  Not wiling for diagnostic CGM. ( this was discussed previously)    Cut down on potatoes With breakfast  Decrease quantity of popcorn/afternoon snack  Be consistent with medications and meals.    Recommend checking blood sugars before meals and at bedtime.    If Blood glucose are low more often-> 2-3 times/week- give us a call.  The patient is advised to Make better food choices: reduce carbs, Reduce portion size, weight loss and exercise 3-4 times a week.  Discussed hypoglycemia signs and symptoms as well as management in detail.     2. Hypertension -under good control.  Previously ACE discussed, pt is reluctant to start.   Margaret to follow up with Primary Care provider regarding elevated blood pressure.      3. Hyperlipidemia - Under Fair control.  Taking atorvastatin 40 mg.    4. Prevention  Dental-Yes  ASA-yes  Smoking- No    5. Hypothyroidism  On levoT 112 mcg qd  with 1/2 tab on Sundays.  Continue current dose of levothyroxine.  Recent labs in normal range.  Plan:  Continue levothyroxine at current dose 112 mcg/day for 6 days a week and 56 mcg/day for 1 day a week.  Clinically looks euthyroid.  Labs in 6  months    7.  Hyperparathyroidism :  Mildly elevated PTH at 100 c/w possible early primary hyperparathyroidism.  Calcium normal.  Vitamin D normal.  Kidney function normal  DEXA showing osteoporosis. Not on medication at this time.  Plan:   continue to monitor at this time in the setting of stable PTH, normal calcium levels.    Repeat labs in 6 months. (needs to be ordered in next visit)  Continue vitamin D  replacement.    8.  Hypercalcemia (new):  Noted on labs done in December 2018. PTH is in normal range at that time.  F/u labs showing normal calcium 10.1 with slightly high PTH (83)  Plan:  In the setting of asymptomatic nature plan to continue to monitor and close follow-up.  Labs in 6 months.  (needs to be ordered in next visit)  Encourage good hydration.    9. Osteoporosis:  Previously Discussed DEXA scan showing osteoporosis  Discussed treatment options including Fosamax previously.  At this time she would like to concentrate on calcium and vitamin D supplement and is not ready for medication  Risk of fractures and complications associated with osteoporosis was discussed with patient and she understands.  The pt was advised to    Maintain an adequate calcium and vitamin D intake and to supplement vitamin D if needed to maintain serum levels of 25 hydroxy D (25 OH D) between 30-60 ng/ml.    Limit alcohol intake to no more than 2 servings per day.    Limit caffeine intake.    Maintain an active lifestyle including weight-bearing exercises for at least 30 mins daily.    Take measures to reduce the risk of falling.      All questions were answered.  The patient indicates understanding of the above issues and agrees with the plan set forth.  patient had multiple questions which were answered.  She had more questions about calcium supplements and she brought all calcium supplement that she is taking.  Advised goal calcium intake is about 0438-4993 mg/day.       More than 50% of face to face time spent with Ms. Ramos on counseling / coordinating her care.  Total face to face time was 45 minutes.      Follow-up:  Follow up in 3 months    Rosario Buenrostro  CC: Jossie Luciano MD

## 2019-04-30 NOTE — NURSING NOTE
"ENDOCRINOLOGY INTAKE FORM    Patient Name:  Margaret Ramos  :  1945    Is patient Diabetic?   Yes: type 2  Does patient have non-diabetic or other endocrine issues?  Yes:     Vitals: /62 (BP Location: Right arm, Patient Position: Chair, Cuff Size: Adult Large)   Pulse 84   Temp 97.6  F (36.4  C) (Oral)   Ht 1.594 m (5' 2.75\")   Wt 88.5 kg (195 lb)   SpO2 98%   Breastfeeding? No   BMI 34.82 kg/m    BMI= Body mass index is 34.82 kg/m .    Flu vaccine:  No  Pneumonia vaccine:  Yes: pneumovax    Smoking and Alcohol use:  Social History     Tobacco Use     Smoking status: Never Smoker     Smokeless tobacco: Never Used   Substance Use Topics     Alcohol use: No     Alcohol/week: 0.0 oz     Drug use: No       Foot Exam: Yes: 18  Eye Exam:  Yes: 18  Dental Exam:  Yes: 19  Aspirin Use:  Yes: 81 mg    Lab Results   Component Value Date    A1C 8.5 2019    A1C 8.3 2018    A1C 8.3 08/10/2018    A1C 7.7 2017    A1C 7.9 2017       Lab Results   Component Value Date    MICROL 8 2019     No results found for: MICROALBUMIN    Rosie Francis CMA  [facility]  [facility]    "

## 2019-04-30 NOTE — LETTER
4/30/2019         RE: Margaret Ramos  [address] [place] [address]        Dear Colleague,    Thank you for referring your patient, Margaret Ramos, to the [facility]. Please see a copy of my visit note below.    Name: Margaret Ramos  Seen for f/u. Earlier [facility] .  Here with her sister Rosy.  HPI:  Margaret Ramos is a 73 year old female who presents for the evaluation/management of DM.  She typically goes to bed late at night around midnight-2:00 in the morning and wakes up around 10-11:00 in the morning.  Typically has breakfast around noon, afternoon snack of popcorn, supper and typically has bedtime snack every single night.   She is worried about hypoglycemia.  She does not like to take medications.  Reports that she will take extra pill of metformin if the blood sugar is high at night but after she has to wake up at night with abdominal pain and cramping if she takes nighttime dose of metformin.      1. Type 2 DM:  Orginally diagnosed at the age of: 54 in 11/99. Initially on glucovance, then switched to metformin and glyburide due to formulary issues.  Current Regimen: Metformin 750 mg qam, 500 qpm and (plus 250  Mg at bedtime if BG > 200)   and glyburide 2.5 mg in AM with breakfast and 1.25 mg at supper  ( plus more 1.25 at night if BG if BG are high > 200)  yes:     Diabetes Medication(s)     Biguanides       metFORMIN (GLUCOPHAGE) 500 MG tablet    Take 1 tablet (500 mg) by mouth 3 times daily (with meals) Take 1 and 1/2 tab qam, 1/2 tab q lunch and 1/2 tab q pm    Sulfonylureas       glyBURIDE (DIABETA /MICRONASE) 2.5 MG tablet    Take 1 tablet (2.5 mg) by mouth 2 times daily (with meals)        BS checks: bid 201.    Average Meter Download: It is variable.  Blood sugars ranging from 109-431.  No major episodes of hypoglycemia noted or reported.  Meals: breakfast/brunch: around noon, afternoon snack at 3:00- 4:00 AM (popcorn), supper (variable timings).  Not much eating  out.    Complications:   Diabetes Complications  Description / Detail    Diabetic Retinopathy  No   CAD / PAD  Yes. S/p stent placement   Neuropathy  No   Nephropathy / Microalbuminuria  No   Gastroparesis  No   Hypoglycemia [place]  No     2. Hypertension: Blood Pressure today:   BP Readings from Last 3 Encounters:   01/10/19 122/60   01/04/19 120/64   12/18/18 144/60     Not on medication.  She refused medications in past.  3. Hyperlipidemia: On medication  PMH/PSH:  Past Medical History:   Diagnosis Date     ASCVD (arteriosclerotic cardiovascular disease) 8/10/2016     Chronic ischemic heart disease 6/13/2016     Hyperparathyroidism (H) 1/4/2018     Hypothyroidism 10/20/2014     Inferior MI (H) 8/10/2016    2016      Mixed hyperlipidemia (DYSLIPIDEMIA) 10/20/2014     Morbid obesity (H) 10/13/2015     S/P drug eluting coronary stent placement 8/10/2016    RCA 6/2016      Type 2 diabetes mellitus with hyperglycemia (H) 10/20/2014       Past Surgical History:   Procedure Laterality Date     APPENDECTOMY      age 7     BACK SURGERY  1978     CARPAL TUNNEL RELEASE RT/LT      bilateral     wisdom teeth extraction       Family Hx:  Family History   Problem Relation Age of Onset     Diabetes Mother         type 2     Diabetes Sister         both sisters have type 2     Coronary Artery Disease Sister      Glaucoma Maternal Aunt      Macular Degeneration No family hx of      Thyroid disease: No         DM2: Yes - sisters,mother         Autoimmune: DM1, SLE, RA, Vitiligo No    Social Hx:  Social History     Socioeconomic History     Marital status: Single     Spouse name: Not on file     Number of children: 0     Years of education: Not on file     Highest education level: Not on file   Occupational History     Not on file   Social Needs     Financial resource strain: Not on file     Food insecurity:     Worry: Not on file     Inability: Not on file     Transportation needs:     Medical: Not on file     Non-medical: Not  "on file   Tobacco Use     Smoking status: Never Smoker     Smokeless tobacco: Never Used   Substance and Sexual Activity     Alcohol use: No     Alcohol/week: 0.0 oz     Drug use: No     Sexual activity: Not Currently   Lifestyle     Physical activity:     Days per week: Not on file     Minutes per session: Not on file     Stress: Not on file   Relationships     Social connections:     Talks on phone: Not on file     Gets together: Not on file     Attends Jew service: Not on file     Active member of club or organization: Not on file     Attends meetings of clubs or organizations: Not on file     Relationship status: Not on file     Intimate partner violence:     Fear of current or ex partner: Not on file     Emotionally abused: Not on file     Physically abused: Not on file     Forced sexual activity: Not on file   Other Topics Concern     Parent/sibling w/ CABG, MI or angioplasty before 65F 55M? No   Social History Narrative     Not on file          MEDICATIONS:  has a current medication list which includes the following prescription(s): ACE NOT PRESCRIBED, INTENTIONAL,, ace/arb/arni not prescribed, albuterol, vitamin c, aspirin, astaxanthin, atorvastatin calcium, blood glucose, blood glucose monitoring, calcium citrate-vitamin d, calcium-magnesium, carboxymethylcellulose pf, cholecalciferol, coq-10, diphenhydramine hcl, glyburide, ketoconazole, levothyroxine, lutein-zeaxanthin-bilberry, magnesium malate, metformin, multiple vitamins-calcium, multivitamin, omega-3 fatty acids, and vitamin k.    ROS     ROS: 10 point ROS neg other than the symptoms noted above in the HPI.    Physical Exam   VS: /62 (BP Location: Right arm, Patient Position: Chair, Cuff Size: Adult Large)   Pulse 84   Temp 97.6  F (36.4  C) (Oral)   Ht 1.594 m (5' 2.75\")   Wt 88.5 kg (195 lb)   SpO2 98%   Breastfeeding? No   BMI 34.82 kg/m     GENERAL: AXOX3, NAD, well dressed, answering questions appropriately, appears stated " age.  HEENT: No exopthalmous, no proptosis, EOMI, no lig lag, no retraction  NEUROLOGY: CN grossly intact, no tremors  PSYCH: normal affect and mood    LABS:  Lab Results   Component Value Date    A1C 8.5 04/17/2019    A1C 8.3 12/11/2018    A1C 8.3 08/10/2018    A1C 7.7 12/28/2017    A1C 7.9 08/22/2017       Component Value Date    UMALCR Unable to calculate due to low value 11/29/2016          ENDO THYROID LABS-UMP Latest Ref Rng & Units 4/17/2019   TSH 0.40 - 4.00 mU/L 0.91   T4 FREE 0.76 - 1.46 ng/dL 1.15     ENDO THYROID LABS-UMP Latest Ref Rng & Units 8/10/2018 12/28/2017   TSH 0.40 - 4.00 mU/L 1.16 1.23   T4 FREE 0.76 - 1.46 ng/dL 1.06    FREE T3 2.3 - 4.2 pg/mL       ENDO THYROID LABS-UMP Latest Ref Rng & Units 9/12/2017 11/29/2016   TSH 0.40 - 4.00 mU/L 0.86 0.76   T4 FREE 0.76 - 1.46 ng/dL 1.30 1.21   FREE T3 2.3 - 4.2 pg/mL 2.0 (L)      ENDO THYROID LABS-UMP Latest Ref Rng & Units 12/3/2015   TSH 0.40 - 4.00 mU/L 0.34 (L)   T4 FREE 0.76 - 1.46 ng/dL 1.21   FREE T3 2.3 - 4.2 pg/mL      ENDO CALCIUM LABS-UMP Latest Ref Rng & Units 8/10/2018   CALCIUM 8.5 - 10.1 mg/dL 9.8     ENDO CALCIUM LABS-UMP Latest Ref Rng & Units 8/22/2017 11/29/2016   CALCIUM 8.5 - 10.1 mg/dL 10.1 10.0     ENDO CALCIUM LABS-UMP Latest Ref Rng & Units 7/26/2016 7/14/2016   CALCIUM 8.5 - 10.1 mg/dL  10.6     ENDO CALCIUM LABS-UMP Latest Ref Rng & Units 8/10/2018 12/28/2017   PARATHYROID HORMONE INTACT 18 - 80 pg/mL 107 (H) 87 (H)     ENDO CALCIUM LABS-UMP Latest Ref Rng & Units 9/12/2017 8/22/2017   PARATHYROID HORMONE INTACT 18 - 80 pg/mL  100 (H)     ENDO CALCIUM LABS-UMP Latest Ref Rng & Units 11/29/2016   PARATHYROID HORMONE INTACT 18 - 80 pg/mL 100 (H)     Vitamin D Deficiency Screening Results:  Lab Results   Component Value Date    VITDT 57 04/17/2019    VITDT 50 12/11/2018    VITDT 53 08/10/2018    VITDT 42 12/28/2017    VITDT 48 09/12/2017       All pertinent notes, labs, and images personally reviewed by me.     A/P    LEONARD Ramos is a 74 year old here for the evaluation/management of diabetes:    1. DM2 -under poor control. A1c 8.5%. Diabetes complicated by CAD status post stent placement.  Dietary indiscretion playing a role in worsening control.  She is not consistent with medication and taking metformin and glyburide at night based on BG.  Worried about hypoglycemia.  No major episodes of hypoglycemia noted/reported.   Plan:  Increase metformin in AM.  Take regular metformin 500 mg with breakfast, evening snack and bedtime snack. This is in addition to metformin  mg in AM  (start metformin  mg in AM)  Continue glipizide at current dose.  Not wiling for diagnostic CGM. ( this was discussed previously)    Cut down on potatoes With breakfast  Decrease quantity of popcorn/afternoon snack  Be consistent with medications and meals.    Recommend checking blood sugars before meals and at bedtime.    If Blood glucose are low more often-> 2-3 times/week- give us a call.  The patient is advised to Make better food choices: reduce carbs, Reduce portion size, weight loss and exercise 3-4 times a week.  Discussed hypoglycemia signs and symptoms as well as management in detail.     2. Hypertension -under good control.  Previously ACE discussed, pt is reluctant to start.     3. Hyperlipidemia - Under Fair control.  Taking atorvastatin 40 mg.    4. Prevention  Dental-Yes  ASA-yes  Smoking- No    5. Hypothyroidism  On levoT 112 mcg qd  with 1/2 tab on Sundays.  Continue current dose of levothyroxine.  Recent labs in normal range.  Plan:  Continue levothyroxine at current dose 112 mcg/day for 6 days a week and 56 mcg/day for 1 day a week.  Clinically looks euthyroid.  Labs in 6  months    7.  Hyperparathyroidism :  Mildly elevated PTH at 100 c/w possible early primary hyperparathyroidism.  Calcium normal.  Vitamin D normal.  Kidney function normal  DEXA showing osteoporosis. Not on medication at this time.  Plan:   continue to monitor at  this time in the setting of stable PTH, normal calcium levels.    Repeat labs in 6 months. (needs to be ordered in next visit)  Continue vitamin D replacement.    8.  Hypercalcemia (new):  Noted on labs done in December 2018. PTH is in normal range at that time.  F/u labs showing normal calcium 10.1 with slightly high PTH (83)  Plan:  In the setting of asymptomatic nature plan to continue to monitor and close follow-up.  Labs in 6 months.  (needs to be ordered in next visit)  Encourage good hydration.    9. Osteoporosis:  Previously Discussed DEXA scan showing osteoporosis  Discussed treatment options including Fosamax previously.  At this time she would like to concentrate on calcium and vitamin D supplement and is not ready for medication  Risk of fractures and complications associated with osteoporosis was discussed with patient and she understands.  The pt was advised to    Maintain an adequate calcium and vitamin D intake and to supplement vitamin D if needed to maintain serum levels of 25 hydroxy D (25 OH D) between 30-60 ng/ml.    Limit alcohol intake to no more than 2 servings per day.    Limit caffeine intake.    Maintain an active lifestyle including weight-bearing exercises for at least 30 mins daily.    Take measures to reduce the risk of falling.      All questions were answered.  The patient indicates understanding of the above issues and agrees with the plan set forth.  patient had multiple questions which were answered.  She had more questions about calcium supplements and she brought all calcium supplement that she is taking.  Advised goal calcium intake is about 9161-1043 mg/day.       More than 50% of face to face time spent with Ms. Ramos on counseling / coordinating her care.  Total face to face time was 45 minutes.      Follow-up:  Follow up in 3 months    Rosario [place]  CC: Jossie Luciano MD    Again, thank you for allowing me to participate in the care of your patient.         Sincerely,        Rosario Shannon MD

## 2019-04-30 NOTE — PATIENT INSTRUCTIONS
[facility] & [place] locations   Dr Shannon, Endocrinology Department      [facility]   [address]  [place] [address]  Appointment [phone]  Fax: [phone]  [place]: Monday and Tuesday         [facility]   [address]  [place] [address]  Appointment [phone]  Fax: [phone]  [place]: Wednesday and Thursday            To provide the best diabetic care, please bring your blood glucose meter to each and every visit with your Endocrinologist.  Your blood glucose meter/insulin pump will be downloaded at every appointment.    Please arrive 15 minutes before your scheduled appointment.  This will allow for your blood glucose meter/insulin pump to be downloaded.  If you are wearing DEXCOM please bring  or sharing code so that it can be downloaded.  If you are using freestyle diane personal sensors please bring the reader.  If you are using TANDEM insulin pump please have your username and password to get info from [employer] website.      Increase metformin in AM.  Take regular metformin 500 mg with breakfast, evening snack and bedtime snack. This is in addition to metformin  mg in AM  (start metformin  mg in AM)  Continue glipizide at current dose.  continue levothyroxine at current dose.    Labs and f/u in 3 months    Diabetes slabs in 3 months  Other labs in 6 months     Recommend checking blood sugars before meals and at bedtime.    If Blood glucose are low more often-> 2-3 times/week- give us a call.  The patient is advised to Make better food choices: reduce carbs, Reduce portion size, weight loss and exercise 3-4 times a week.  Discussed hypoglycemia signs and symptoms as well as management in detail.      You should get 1000 mg/day calcium in divided doses of no more than 500 mg/dose.  This INCLUDES what is in your food as well as what is in any supplements you  may be taking.    Vit D about 1000 IU/day ( unless you have vit D deficiency- in that case higher dose)  Dietary sources of calcium:: These also contain vitamin D  Milk                            8 oz            300 mg calcium  Yogurt                          1 cup           400 mg calcium   Hard cheese                     1.5 oz          300 mg  Cottage cheese                  2 cup           300 mg  Orange juice with Calcium       8 oz            300 mg  Low fat dairy sources are recommended      You should get 30 minutes of moderate weight bearing exercise on most days of the week .  Weight bearing exercise includes such things as walking, jogging, hiking, dancing.  You should also get Strength training 2 or more times/week in addition to other weight -being exercise. Strength training uses weight or resistance beyond that seen in everyday activities -(pilates, weight training with free weights, weight machines or resistance bands)

## 2019-05-06 ENCOUNTER — TELEPHONE (OUTPATIENT)
Dept: ENDOCRINOLOGY | Facility: CLINIC | Age: 74
End: 2019-05-06

## 2019-05-06 NOTE — TELEPHONE ENCOUNTER
Forms/paperwork reviewed, completed and signed.  Please fax or send the papers as requested, document in chart and close the encounter.    Thank you.    Rosario Shannon

## 2019-05-06 NOTE — TELEPHONE ENCOUNTER
Form was faxed, copied, sent to scanning.      Rosie Francis, [facility]  [place]

## 2019-07-15 ENCOUNTER — TELEPHONE (OUTPATIENT)
Dept: ENDOCRINOLOGY | Facility: CLINIC | Age: 74
End: 2019-07-15

## 2019-07-15 DIAGNOSIS — E21.3 HYPERPARATHYROIDISM (H): ICD-10-CM

## 2019-07-15 DIAGNOSIS — E78.2 MIXED HYPERLIPIDEMIA: ICD-10-CM

## 2019-07-15 DIAGNOSIS — E83.52 HYPERCALCEMIA: ICD-10-CM

## 2019-07-15 DIAGNOSIS — E11.65 TYPE 2 DIABETES MELLITUS WITH HYPERGLYCEMIA, WITHOUT LONG-TERM CURRENT USE OF INSULIN (H): ICD-10-CM

## 2019-07-15 DIAGNOSIS — E03.8 OTHER SPECIFIED HYPOTHYROIDISM: Primary | ICD-10-CM

## 2019-07-15 NOTE — TELEPHONE ENCOUNTER
Reason for Call: Request for an order or referral:    Order or referral being requested: labs - fasting (lipid panel), A1C, thyroid, etc    Date needed: as soon as possible    Has the patient been seen by the PCP for this problem? YES    Additional comments: Please add orders for full work-up per Dr Shannon recommendations, including lipid panel    Phone number Patient can be reached at:  Home number on file [phone] (home)    Best Time:      Can we leave a detailed message on this number?  YES    Call taken on 7/15/2019 at 3:03 PM by Marline Flores

## 2019-07-15 NOTE — TELEPHONE ENCOUNTER
Pended labs that were done last time adding a lipid panel and bmp her HM.    Next 5 appointments (look out 90 days)    Aug 05, 2019  3:00 PM CDT  Return Visit with Rosario Shannon MD  [facility] [place] [facility] [address] [place] [address] [id number] [phone]        Rosie Francis CMA  [facility]  [place]

## 2019-07-23 DIAGNOSIS — E03.8 OTHER SPECIFIED HYPOTHYROIDISM: ICD-10-CM

## 2019-07-23 DIAGNOSIS — E21.3 HYPERPARATHYROIDISM (H): ICD-10-CM

## 2019-07-23 DIAGNOSIS — E78.2 MIXED HYPERLIPIDEMIA: ICD-10-CM

## 2019-07-23 DIAGNOSIS — E11.65 TYPE 2 DIABETES MELLITUS WITH HYPERGLYCEMIA, WITHOUT LONG-TERM CURRENT USE OF INSULIN (H): ICD-10-CM

## 2019-07-23 DIAGNOSIS — E83.52 HYPERCALCEMIA: ICD-10-CM

## 2019-07-23 LAB
CA-I SERPL ISE-MCNC: 5.5 MG/DL (ref 4.4–5.2)
HBA1C MFR BLD: 8.1 % (ref 0–5.6)
PTH-INTACT SERPL-MCNC: 80 PG/ML (ref 18–80)

## 2019-07-23 PROCEDURE — 84100 ASSAY OF PHOSPHORUS: CPT | Performed by: INTERNAL MEDICINE

## 2019-07-23 PROCEDURE — 83735 ASSAY OF MAGNESIUM: CPT | Performed by: INTERNAL MEDICINE

## 2019-07-23 PROCEDURE — 82310 ASSAY OF CALCIUM: CPT | Performed by: INTERNAL MEDICINE

## 2019-07-23 PROCEDURE — 82330 ASSAY OF CALCIUM: CPT | Performed by: INTERNAL MEDICINE

## 2019-07-23 PROCEDURE — 83036 HEMOGLOBIN GLYCOSYLATED A1C: CPT | Performed by: INTERNAL MEDICINE

## 2019-07-23 PROCEDURE — 80061 LIPID PANEL: CPT | Performed by: INTERNAL MEDICINE

## 2019-07-23 PROCEDURE — 84443 ASSAY THYROID STIM HORMONE: CPT | Performed by: INTERNAL MEDICINE

## 2019-07-23 PROCEDURE — 82306 VITAMIN D 25 HYDROXY: CPT | Performed by: INTERNAL MEDICINE

## 2019-07-23 PROCEDURE — 84439 ASSAY OF FREE THYROXINE: CPT | Performed by: INTERNAL MEDICINE

## 2019-07-23 PROCEDURE — 83970 ASSAY OF PARATHORMONE: CPT | Performed by: INTERNAL MEDICINE

## 2019-07-23 PROCEDURE — 36415 COLL VENOUS BLD VENIPUNCTURE: CPT | Performed by: INTERNAL MEDICINE

## 2019-07-24 LAB
CALCIUM SERPL-MCNC: 10.2 MG/DL (ref 8.5–10.1)
CHOLEST SERPL-MCNC: 197 MG/DL
DEPRECATED CALCIDIOL+CALCIFEROL SERPL-MC: 45 UG/L (ref 20–75)
HDLC SERPL-MCNC: 54 MG/DL
LDLC SERPL CALC-MCNC: 114 MG/DL
MAGNESIUM SERPL-MCNC: 2.2 MG/DL (ref 1.6–2.3)
NONHDLC SERPL-MCNC: 143 MG/DL
PHOSPHATE SERPL-MCNC: 3.1 MG/DL (ref 2.5–4.5)
T4 FREE SERPL-MCNC: 1.15 NG/DL (ref 0.76–1.46)
TRIGL SERPL-MCNC: 145 MG/DL
TSH SERPL DL<=0.005 MIU/L-ACNC: 1.14 MU/L (ref 0.4–4)

## 2019-08-05 ENCOUNTER — OFFICE VISIT (OUTPATIENT)
Dept: ENDOCRINOLOGY | Facility: CLINIC | Age: 74
End: 2019-08-05
Payer: MEDICARE

## 2019-08-05 VITALS
DIASTOLIC BLOOD PRESSURE: 72 MMHG | HEIGHT: 63 IN | WEIGHT: 194.2 LBS | HEART RATE: 89 BPM | BODY MASS INDEX: 34.41 KG/M2 | TEMPERATURE: 97.6 F | SYSTOLIC BLOOD PRESSURE: 158 MMHG | OXYGEN SATURATION: 98 % | RESPIRATION RATE: 20 BRPM

## 2019-08-05 DIAGNOSIS — E03.8 OTHER SPECIFIED HYPOTHYROIDISM: ICD-10-CM

## 2019-08-05 DIAGNOSIS — E21.3 HYPERPARATHYROIDISM (H): ICD-10-CM

## 2019-08-05 DIAGNOSIS — E83.52 HYPERCALCEMIA: ICD-10-CM

## 2019-08-05 DIAGNOSIS — E11.9 TYPE 2 DIABETES MELLITUS WITHOUT COMPLICATION, WITHOUT LONG-TERM CURRENT USE OF INSULIN (H): ICD-10-CM

## 2019-08-05 DIAGNOSIS — E11.65 TYPE 2 DIABETES MELLITUS WITH HYPERGLYCEMIA, WITHOUT LONG-TERM CURRENT USE OF INSULIN (H): Primary | ICD-10-CM

## 2019-08-05 PROCEDURE — 99213 OFFICE O/P EST LOW 20 MIN: CPT | Performed by: INTERNAL MEDICINE

## 2019-08-05 RX ORDER — GLYBURIDE 2.5 MG/1
2.5 TABLET ORAL 2 TIMES DAILY WITH MEALS
Qty: 270 TABLET | Refills: 3 | Status: CANCELLED | OUTPATIENT
Start: 2019-08-05

## 2019-08-05 ASSESSMENT — MIFFLIN-ST. JEOR: SCORE: 1346.05

## 2019-08-05 NOTE — LETTER
8/5/2019         RE: Margaret Ramos  [address] [place] [address]        Dear Colleague,    Thank you for referring your patient, Margaret Ramos, to the [facility]. Please see a copy of my visit note below.    Name: Margaret Ramos  Seen for f/u. Earlier TCO .  HPI:  Margaret Ramos is a 74 year old female who presents for the evaluation/management of DM.  She typically goes to bed late at night around midnight-2:00 in the morning and wakes up around 10-11:00 in the morning.  Typically has breakfast around noon, afternoon snack of popcorn, supper and typically has bedtime snack every single night.   She is worried about hypoglycemia.  She does not like to take medications.  Reports that she will take extra pill of metformin if the blood sugar is high at night but after she has to wake up at night with abdominal pain and cramping if she takes nighttime dose of metformin.      1. Type 2 DM:  Orginally diagnosed at the age of: 54 in 11/99. Initially on glucovance, then switched to metformin and glyburide due to formulary issues.  Current Regimen: Metformin 1000 mg qam, 250-500 qpm and (plus 250  Mg at bedtime if BG > 200)   and glyburide 2.5 mg in AM with breakfast and 1.25 mg at supper ( but not consistent)  ( plus more 1.25 at night if BG if BG are high > 200)  yes:     Diabetes Medication(s)     Biguanides       metFORMIN (GLUCOPHAGE) 500 MG tablet    Take 1 tablet (500 mg) by mouth 3 times daily (with meals) Take 500 mg with evening snack and 500 mg with bedtime snack. This is in addition to metformin  mg in AM     metFORMIN (GLUCOPHAGE-XR) 500 MG 24 hr tablet    Take 1 tablet (500 mg) by mouth daily In addition to metformin regular 500 mg in morning, afternoon and at bedtime.    Sulfonylureas       glyBURIDE (DIABETA /MICRONASE) 2.5 MG tablet    Take 1 tablet (2.5 mg) by mouth 2 times daily (with meals)        BS checks: bid 201.    Average Meter Download: reviewed.  No major  episodes of hypoglycemia noted or reported.  Meals: breakfast/brunch: around noon, afternoon snack at 3:00- 4:00 AM (popcorn), supper (variable timings).  Not much eating out.    Complications:   Diabetes Complications  Description / Detail    Diabetic Retinopathy  No   CAD / PAD  Yes. S/p stent placement   Neuropathy  No   Nephropathy / Microalbuminuria  No   Gastroparesis  No   Hypoglycemia [place]awarness  No     2. Hypertension: Blood Pressure today:   BP Readings from Last 3 Encounters:   08/05/19 (!) 158/72   04/30/19 156/62   01/10/19 122/60     Not on medication.  She refused medications in past.  3. Hyperlipidemia: On medication  PMH/PSH:  Past Medical History:   Diagnosis Date     ASCVD (arteriosclerotic cardiovascular disease) 8/10/2016     Chronic ischemic heart disease 6/13/2016     Hyperparathyroidism (H) 1/4/2018     Hypothyroidism 10/20/2014     Inferior MI (H) 8/10/2016    2016      Mixed hyperlipidemia (DYSLIPIDEMIA) 10/20/2014     Morbid obesity (H) 10/13/2015     S/P drug eluting coronary stent placement 8/10/2016    RCA 6/2016      Type 2 diabetes mellitus with hyperglycemia (H) 10/20/2014       Past Surgical History:   Procedure Laterality Date     APPENDECTOMY      age 7     BACK SURGERY  1978     CARPAL TUNNEL RELEASE RT/LT      bilateral     wisdom teeth extraction       Family Hx:  Family History   Problem Relation Age of Onset     Diabetes Mother         type 2     Diabetes Sister         both sisters have type 2     Coronary Artery Disease Sister      Glaucoma Maternal Aunt      Macular Degeneration No family hx of      Thyroid disease: No         DM2: Yes - sisters,mother         Autoimmune: DM1, SLE, RA, Vitiligo No    Social Hx:  Social History     Socioeconomic History     Marital status: Single     Spouse name: Not on file     Number of children: 0     Years of education: Not on file     Highest education level: Not on file   Occupational History     Not on file   Social Needs      Financial resource strain: Not on file     Food insecurity:     Worry: Not on file     Inability: Not on file     Transportation needs:     Medical: Not on file     Non-medical: Not on file   Tobacco Use     Smoking status: Never Smoker     Smokeless tobacco: Never Used   Substance and Sexual Activity     Alcohol use: No     Alcohol/week: 0.0 oz     Drug use: No     Sexual activity: Not Currently   Lifestyle     Physical activity:     Days per week: Not on file     Minutes per session: Not on file     Stress: Not on file   Relationships     Social connections:     Talks on phone: Not on file     Gets together: Not on file     Attends Rastafarian service: Not on file     Active member of club or organization: Not on file     Attends meetings of clubs or organizations: Not on file     Relationship status: Not on file     Intimate partner violence:     Fear of current or ex partner: Not on file     Emotionally abused: Not on file     Physically abused: Not on file     Forced sexual activity: Not on file   Other Topics Concern     Parent/sibling w/ CABG, MI or angioplasty before 65F 55M? No   Social History Narrative     Not on file          MEDICATIONS:  has a current medication list which includes the following prescription(s): ACE NOT PRESCRIBED, INTENTIONAL,, ace/arb/arni not prescribed, albuterol, vitamin c, aspirin, astaxanthin, atorvastatin calcium, blood glucose, blood glucose monitoring, calcium citrate-vitamin d, calcium-magnesium, carboxymethylcellulose pf, cholecalciferol, coq-10, diphenhydramine hcl, glyburide, ketoconazole, levothyroxine, lutein-zeaxanthin-bilberry, magnesium malate, metformin, metformin, multiple vitamins-calcium, multivitamin, omega-3 fatty acids, and vitamin k.    ROS     ROS: 10 point ROS neg other than the symptoms noted above in the HPI.    Physical Exam   VS: BP (!) 158/72 (BP Location: Right arm, Patient Position: Chair, Cuff Size: Adult Large)   Pulse 89   Temp 97.6  F (36.4  C)  "(Oral)   Resp 20   Ht 1.594 m (5' 2.75\")   Wt 88.1 kg (194 lb 3.2 oz)   LMP  (LMP Unknown)   SpO2 98%   Breastfeeding? No   BMI 34.68 kg/m     GENERAL: AXOX3, NAD, well dressed, answering questions appropriately, appears stated age.  HEENT: No exopthalmous, no proptosis, EOMI, no lig lag, no retraction  NEUROLOGY: CN grossly intact, no tremors  PSYCH: normal affect and mood    LABS:  Lab Results   Component Value Date    A1C 8.1 07/23/2019    A1C 8.5 04/17/2019    A1C 8.3 12/11/2018    A1C 8.3 08/10/2018    A1C 7.7 12/28/2017     Lab Results   Component Value Date    UMALCR 12.13 04/17/2019          ENDO THYROID LABS-UM Latest Ref Rng & Units 7/23/2019   TSH 0.40 - 4.00 mU/L 1.14   T4 FREE 0.76 - 1.46 ng/dL 1.15        ENDO THYROID LABS-UMP Latest Ref Rng & Units 4/17/2019   TSH 0.40 - 4.00 mU/L 0.91   T4 FREE 0.76 - 1.46 ng/dL 1.15     ENDO THYROID LABS-UMP Latest Ref Rng & Units 8/10/2018 12/28/2017   TSH 0.40 - 4.00 mU/L 1.16 1.23   T4 FREE 0.76 - 1.46 ng/dL 1.06    FREE T3 2.3 - 4.2 pg/mL       ENDO THYROID LABS-UMP Latest Ref Rng & Units 9/12/2017 11/29/2016   TSH 0.40 - 4.00 mU/L 0.86 0.76   T4 FREE 0.76 - 1.46 ng/dL 1.30 1.21   FREE T3 2.3 - 4.2 pg/mL 2.0 (L)      ENDO THYROID LABS-UMP Latest Ref Rng & Units 12/3/2015   TSH 0.40 - 4.00 mU/L 0.34 (L)   T4 FREE 0.76 - 1.46 ng/dL 1.21   FREE T3 2.3 - 4.2 pg/mL      ENDO CALCIUM LABS-UMP Latest Ref Rng & Units 8/10/2018   CALCIUM 8.5 - 10.1 mg/dL 9.8     ENDO CALCIUM LABS-UMP Latest Ref Rng & Units 8/22/2017 11/29/2016   CALCIUM 8.5 - 10.1 mg/dL 10.1 10.0     ENDO CALCIUM LABS-UMP Latest Ref Rng & Units 7/26/2016 7/14/2016   CALCIUM 8.5 - 10.1 mg/dL  10.6     ENDO CALCIUM LABS-UMP Latest Ref Rng & Units 8/10/2018 12/28/2017   PARATHYROID HORMONE INTACT 18 - 80 pg/mL 107 (H) 87 (H)     ENDO CALCIUM LABS-UMP Latest Ref Rng & Units 9/12/2017 8/22/2017   PARATHYROID HORMONE INTACT 18 - 80 pg/mL  100 (H)     ENDO CALCIUM LABS-UMP Latest Ref Rng & Units " 11/29/2016   PARATHYROID HORMONE INTACT 18 - 80 pg/mL 100 (H)     Vitamin D Deficiency Screening Results:  Lab Results   Component Value Date    VITDT 45 07/23/2019    VITDT 57 04/17/2019    VITDT 50 12/11/2018    VITDT 53 08/10/2018    VITDT 42 12/28/2017       All pertinent notes, labs, and images personally reviewed by me.     A/P  Ms.Alice LEONARD Ramos is a 74 year old here for the evaluation/management of diabetes:    1. DM2 -under fair control. A1c 8.1%. Diabetes complicated by CAD status post stent placement.  Dietary indiscretion playing a role in worsening control.  She is not consistent with medication and taking metformin and glyburide at night based on BG.  Worried about hypoglycemia.  No major episodes of hypoglycemia noted/reported.   Plan:  Continue metformin at current dose.  Continue glyburide at current dose.  Not wiling for diagnostic CGM. ( this was discussed previously)    Recommend checking blood sugars before meals and at bedtime.    If Blood glucose are low more often-> 2-3 times/week- give us a call.  The patient is advised to Make better food choices: reduce carbs, Reduce portion size, weight loss and exercise 3-4 times a week.  Discussed hypoglycemia signs and symptoms as well as management in detail.     2. Hypertension -under good control.  Previously ACE discussed, pt is reluctant to start.   Margaret to follow up with Primary Care provider regarding elevated blood pressure.      3. Hyperlipidemia - Under Fair control.  Taking atorvastatin 40 mg.  Followed by Primary Care provider.    4. Prevention  Dental-Yes  ASA-yes  Smoking- No    5. Hypothyroidism:  On levoT 112 mcg qd  with 1/2 tab on Sundays.  Continue current dose of levothyroxine.  Recent labs in normal range.  Plan:  Continue levothyroxine at current dose 112 mcg/day for 6 days a week and 56 mcg/day for 1 day a week.  Clinically looks euthyroid.  Labs in 6  months    7.  Hyperparathyroidism :  Mildly elevated PTH at 100 c/w possible  early primary hyperparathyroidism.  Calcium normal.  Vitamin D normal.  Kidney function normal  DEXA showing osteoporosis. Not on medication at this time.  Plan:   continue to monitor at this time in the setting of stable PTH, normal calcium levels.    Repeat labs in 6 months.   Continue vitamin D replacement.    8.  Hypercalcemia (new):  Noted on labs done in December 2018. PTH is in normal range at that time.  F/u labs showing normal calcium 10.1 with slightly high PTH (83)  Plan:  In the setting of asymptomatic nature plan to continue to monitor and close follow-up.  Labs in 6 months.  (needs to be ordered in next visit)  Encourage good hydration.    9. Osteoporosis:  Previously Discussed DEXA scan showing osteoporosis  Discussed treatment options including Fosamax previously.  Not discussed today.  previously she was inclined to concentrate on calcium and vitamin D supplement and was not ready for medication. Risk of fractures and complications associated with osteoporosis was discussed with patient and she understands.    The pt was advised to    Maintain an adequate calcium and vitamin D intake and to supplement vitamin D if needed to maintain serum levels of 25 hydroxy D (25 OH D) between 30-60 ng/ml.    Limit alcohol intake to no more than 2 servings per day.    Limit caffeine intake.    Maintain an active lifestyle including weight-bearing exercises for at least 30 mins daily.    Take measures to reduce the risk of falling.      All questions were answered.  The patient indicates understanding of the above issues and agrees with the plan set forth.  patient had multiple questions which were answered.  She had more questions about calcium supplements and she brought all calcium supplement that she is taking.  Advised goal calcium intake is about 4496-8087 mg/day.       More than 50% of face to face time spent with Ms. Ramos on counseling / coordinating her care.  Total face to face time was 45 minutes.       Follow-up:  Follow up in 6 months    Rosario [place]  CC: Jossie Luciano MD    Addendum to above note and clinic visit:    Labs reviewed.    See result note/telephone encounter.            Again, thank you for allowing me to participate in the care of your patient.        Sincerely,        Rosario Shannon MD

## 2019-08-05 NOTE — PATIENT INSTRUCTIONS
[facility] & [place] locations   Dr Shannon, Endocrinology Department      [facility]   [address]  [place] [address]  Appointment [phone]  Fax: [phone]  [place]: Monday and Tuesday         [facility]   [address]  [place] [address]  Appointment [phone]  Fax: [phone]  [place]: Wednesday and Thursday            To provide the best diabetic care, please bring your blood glucose meter to each and every visit with your Endocrinologist.  Your blood glucose meter/insulin pump will be downloaded at every appointment.    Please arrive 15 minutes before your scheduled appointment.  This will allow for your blood glucose meter/insulin pump to be downloaded.  If you are wearing DEXCOM please bring  or sharing code so that it can be downloaded.  If you are using freestyle diane personal sensors please bring the reader.  If you are using TANDEM insulin pump please have your username and password to get info from [employer] website.    Continue current regimen.  Continue levothyroxine at current dose.  Follow up with dietician.  Labs in 6 months.    Please make a lab appointment for blood work and follow up clinic appointment in 1 week after that to discuss results.    Recommend checking blood sugars before meals and at bedtime.    If Blood glucose are low more often-> 2-3 times/week- give us a call.  The patient is advised to Make better food choices: reduce carbs, Reduce portion size, weight loss and exercise 3-4 times a week.  Discussed hypoglycemia signs and symptoms as well as management in detail.

## 2019-08-05 NOTE — PROGRESS NOTES
Name: Margaret Ramos  Seen for f/u. Earlier TCO .  HPI:  Margaret Ramos is a 74 year old female who presents for the evaluation/management of DM.  She typically goes to bed late at night around midnight-2:00 in the morning and wakes up around 10-11:00 in the morning.  Typically has breakfast around noon, afternoon snack of popcorn, supper and typically has bedtime snack every single night.   She is worried about hypoglycemia.  She does not like to take medications.  Reports that she will take extra pill of metformin if the blood sugar is high at night but after she has to wake up at night with abdominal pain and cramping if she takes nighttime dose of metformin.      1. Type 2 DM:  Orginally diagnosed at the age of: 54 in 11/99. Initially on glucovance, then switched to metformin and glyburide due to formulary issues.  Current Regimen: Metformin 1000 mg qam, 250-500 qpm and (plus 250  Mg at bedtime if BG > 200)   and glyburide 2.5 mg in AM with breakfast and 1.25 mg at supper ( but not consistent)  ( plus more 1.25 at night if BG if BG are high > 200)  yes:     Diabetes Medication(s)     Biguanides       metFORMIN (GLUCOPHAGE) 500 MG tablet    Take 1 tablet (500 mg) by mouth 3 times daily (with meals) Take 500 mg with evening snack and 500 mg with bedtime snack. This is in addition to metformin  mg in AM     metFORMIN (GLUCOPHAGE-XR) 500 MG 24 hr tablet    Take 1 tablet (500 mg) by mouth daily In addition to metformin regular 500 mg in morning, afternoon and at bedtime.    Sulfonylureas       glyBURIDE (DIABETA /MICRONASE) 2.5 MG tablet    Take 1 tablet (2.5 mg) by mouth 2 times daily (with meals)        BS checks: bid 201.    Average Meter Download: reviewed.  No major episodes of hypoglycemia noted or reported.  Meals: breakfast/brunch: around noon, afternoon snack at 3:00- 4:00 AM (popcorn), supper (variable timings).  Not much eating out.    Complications:   Diabetes Complications  Description /  Detail    Diabetic Retinopathy  No   CAD / PAD  Yes. S/p stent placement   Neuropathy  No   Nephropathy / Microalbuminuria  No   Gastroparesis  No   Hypoglycemia [place]arness  No     2. Hypertension: Blood Pressure today:   BP Readings from Last 3 Encounters:   08/05/19 (!) 158/72   04/30/19 156/62   01/10/19 122/60     Not on medication.  She refused medications in past.  3. Hyperlipidemia: On medication  PMH/PSH:  Past Medical History:   Diagnosis Date     ASCVD (arteriosclerotic cardiovascular disease) 8/10/2016     Chronic ischemic heart disease 6/13/2016     Hyperparathyroidism (H) 1/4/2018     Hypothyroidism 10/20/2014     Inferior MI (H) 8/10/2016    2016      Mixed hyperlipidemia (DYSLIPIDEMIA) 10/20/2014     Morbid obesity (H) 10/13/2015     S/P drug eluting coronary stent placement 8/10/2016    RCA 6/2016      Type 2 diabetes mellitus with hyperglycemia (H) 10/20/2014       Past Surgical History:   Procedure Laterality Date     APPENDECTOMY      age 7     BACK SURGERY  1978     CARPAL TUNNEL RELEASE RT/LT      bilateral     wisdom teeth extraction       Family Hx:  Family History   Problem Relation Age of Onset     Diabetes Mother         type 2     Diabetes Sister         both sisters have type 2     Coronary Artery Disease Sister      Glaucoma Maternal Aunt      Macular Degeneration No family hx of      Thyroid disease: No         DM2: Yes - sisters,mother         Autoimmune: DM1, SLE, RA, Vitiligo No    Social Hx:  Social History     Socioeconomic History     Marital status: Single     Spouse name: Not on file     Number of children: 0     Years of education: Not on file     Highest education level: Not on file   Occupational History     Not on file   Social Needs     Financial resource strain: Not on file     Food insecurity:     Worry: Not on file     Inability: Not on file     Transportation needs:     Medical: Not on file     Non-medical: Not on file   Tobacco Use     Smoking status: Never Smoker      "Smokeless tobacco: Never Used   Substance and Sexual Activity     Alcohol use: No     Alcohol/week: 0.0 oz     Drug use: No     Sexual activity: Not Currently   Lifestyle     Physical activity:     Days per week: Not on file     Minutes per session: Not on file     Stress: Not on file   Relationships     Social connections:     Talks on phone: Not on file     Gets together: Not on file     Attends Sikh service: Not on file     Active member of club or organization: Not on file     Attends meetings of clubs or organizations: Not on file     Relationship status: Not on file     Intimate partner violence:     Fear of current or ex partner: Not on file     Emotionally abused: Not on file     Physically abused: Not on file     Forced sexual activity: Not on file   Other Topics Concern     Parent/sibling w/ CABG, MI or angioplasty before 65F 55M? No   Social History Narrative     Not on file          MEDICATIONS:  has a current medication list which includes the following prescription(s): ACE NOT PRESCRIBED, INTENTIONAL,, ace/arb/arni not prescribed, albuterol, vitamin c, aspirin, astaxanthin, atorvastatin calcium, blood glucose, blood glucose monitoring, calcium citrate-vitamin d, calcium-magnesium, carboxymethylcellulose pf, cholecalciferol, coq-10, diphenhydramine hcl, glyburide, ketoconazole, levothyroxine, lutein-zeaxanthin-bilberry, magnesium malate, metformin, metformin, multiple vitamins-calcium, multivitamin, omega-3 fatty acids, and vitamin k.    ROS     ROS: 10 point ROS neg other than the symptoms noted above in the HPI.    Physical Exam   VS: BP (!) 158/72 (BP Location: Right arm, Patient Position: Chair, Cuff Size: Adult Large)   Pulse 89   Temp 97.6  F (36.4  C) (Oral)   Resp 20   Ht 1.594 m (5' 2.75\")   Wt 88.1 kg (194 lb 3.2 oz)   LMP  (LMP Unknown)   SpO2 98%   Breastfeeding? No   BMI 34.68 kg/m    GENERAL: AXOX3, NAD, well dressed, answering questions appropriately, appears stated " age.  HEENT: No exopthalmous, no proptosis, EOMI, no lig lag, no retraction  NEUROLOGY: CN grossly intact, no tremors  PSYCH: normal affect and mood    LABS:  Lab Results   Component Value Date    A1C 8.1 07/23/2019    A1C 8.5 04/17/2019    A1C 8.3 12/11/2018    A1C 8.3 08/10/2018    A1C 7.7 12/28/2017     Lab Results   Component Value Date    UMALCR 12.13 04/17/2019          ENDO THYROID LABS-UMP Latest Ref Rng & Units 7/23/2019   TSH 0.40 - 4.00 mU/L 1.14   T4 FREE 0.76 - 1.46 ng/dL 1.15        ENDO THYROID LABS-UMP Latest Ref Rng & Units 4/17/2019   TSH 0.40 - 4.00 mU/L 0.91   T4 FREE 0.76 - 1.46 ng/dL 1.15     ENDO THYROID LABS-[facility] Latest Ref Rng & Units 8/10/2018 12/28/2017   TSH 0.40 - 4.00 mU/L 1.16 1.23   T4 FREE 0.76 - 1.46 ng/dL 1.06    FREE T3 2.3 - 4.2 pg/mL       ENDO THYROID LABS-[facility] Latest Ref Rng & Units 9/12/2017 11/29/2016   TSH 0.40 - 4.00 mU/L 0.86 0.76   T4 FREE 0.76 - 1.46 ng/dL 1.30 1.21   FREE T3 2.3 - 4.2 pg/mL 2.0 (L)      ENDO THYROID LABS-[facility] Latest Ref Rng & Units 12/3/2015   TSH 0.40 - 4.00 mU/L 0.34 (L)   T4 FREE 0.76 - 1.46 ng/dL 1.21   FREE T3 2.3 - 4.2 pg/mL      ENDO CALCIUM LABS-[facility] Latest Ref Rng & Units 8/10/2018   CALCIUM 8.5 - 10.1 mg/dL 9.8     ENDO CALCIUM LABS-[facility] Latest Ref Rng & Units 8/22/2017 11/29/2016   CALCIUM 8.5 - 10.1 mg/dL 10.1 10.0     ENDO CALCIUM LABS-[facility] Latest Ref Rng & Units 7/26/2016 7/14/2016   CALCIUM 8.5 - 10.1 mg/dL  10.6     ENDO CALCIUM LABS-UMP Latest Ref Rng & Units 8/10/2018 12/28/2017   PARATHYROID HORMONE INTACT 18 - 80 pg/mL 107 (H) 87 (H)     ENDO CALCIUM LABS-UMP Latest Ref Rng & Units 9/12/2017 8/22/2017   PARATHYROID HORMONE INTACT 18 - 80 pg/mL  100 (H)     ENDO CALCIUM LABS-UMP Latest Ref Rng & Units 11/29/2016   PARATHYROID HORMONE INTACT 18 - 80 pg/mL 100 (H)     Vitamin D Deficiency Screening Results:  Lab Results   Component Value Date    VITDT 45 07/23/2019    VITDT 57 04/17/2019    VITDT 50 12/11/2018    VITDT 53 08/10/2018    VITDT  42 12/28/2017       All pertinent notes, labs, and images personally reviewed by me.     A/P  Ms.Margaret Ramos is a 74 year old here for the evaluation/management of diabetes:    1. DM2 -under fair control. A1c 8.1%. Diabetes complicated by CAD status post stent placement.  Dietary indiscretion playing a role in worsening control.  She is not consistent with medication and taking metformin and glyburide at night based on BG.  Worried about hypoglycemia.  No major episodes of hypoglycemia noted/reported.   Plan:  Continue metformin at current dose.  Continue glyburide at current dose.  Not wiling for diagnostic CGM. ( this was discussed previously)    Recommend checking blood sugars before meals and at bedtime.    If Blood glucose are low more often-> 2-3 times/week- give us a call.  The patient is advised to Make better food choices: reduce carbs, Reduce portion size, weight loss and exercise 3-4 times a week.  Discussed hypoglycemia signs and symptoms as well as management in detail.     2. Hypertension -under good control.  Previously ACE discussed, pt is reluctant to start.   Margaret to follow up with Primary Care provider regarding elevated blood pressure.      3. Hyperlipidemia - Under Fair control.  Taking atorvastatin 40 mg.  Followed by Primary Care provider.    4. Prevention  Dental-Yes  ASA-yes  Smoking- No    5. Hypothyroidism:  On levoT 112 mcg qd  with 1/2 tab on Sundays.  Continue current dose of levothyroxine.  Recent labs in normal range.  Plan:  Continue levothyroxine at current dose 112 mcg/day for 6 days a week and 56 mcg/day for 1 day a week.  Clinically looks euthyroid.  Labs in 6  months    7.  Hyperparathyroidism :  Mildly elevated PTH at 100 c/w possible early primary hyperparathyroidism.  Calcium normal.  Vitamin D normal.  Kidney function normal  DEXA showing osteoporosis. Not on medication at this time.  Plan:   continue to monitor at this time in the setting of stable PTH, normal calcium  levels.    Repeat labs in 6 months.   Continue vitamin D replacement.    8.  Hypercalcemia (new):  Noted on labs done in December 2018. PTH is in normal range at that time.  F/u labs showing normal calcium 10.1 with slightly high PTH (83)  Plan:  In the setting of asymptomatic nature plan to continue to monitor and close follow-up.  Labs in 6 months.  (needs to be ordered in next visit)  Encourage good hydration.    9. Osteoporosis:  Previously Discussed DEXA scan showing osteoporosis  Discussed treatment options including Fosamax previously.  Not discussed today.  previously she was inclined to concentrate on calcium and vitamin D supplement and was not ready for medication. Risk of fractures and complications associated with osteoporosis was discussed with patient and she understands.    The pt was advised to    Maintain an adequate calcium and vitamin D intake and to supplement vitamin D if needed to maintain serum levels of 25 hydroxy D (25 OH D) between 30-60 ng/ml.    Limit alcohol intake to no more than 2 servings per day.    Limit caffeine intake.    Maintain an active lifestyle including weight-bearing exercises for at least 30 mins daily.    Take measures to reduce the risk of falling.      All questions were answered.  The patient indicates understanding of the above issues and agrees with the plan set forth.  patient had multiple questions which were answered.  She had more questions about calcium supplements and she brought all calcium supplement that she is taking.  Advised goal calcium intake is about 5563-8853 mg/day.       More than 50% of face to face time spent with Ms. Ramos on counseling / coordinating her care.  Total face to face time was 45 minutes.      Follow-up:  Follow up in 6 months    Rosario Buenrostro  CC: Jossie Luciano MD    Addendum to above note and clinic visit:    Labs reviewed.    See result note/telephone encounter.

## 2019-08-05 NOTE — NURSING NOTE
ENDOCRINOLOGY INTAKE FORM    Patient Name:  Margaret Ramos  :  1945    Is patient Diabetic?   Yes: type 2  Does patient have non-diabetic or other endocrine issues?  Yes: hypercalcemia, hyperPTH, obesity, hypothyroidism, hyperlipidemia    Vitals: There were no vitals taken for this visit.  BMI= There is no height or weight on file to calculate BMI.    Flu vaccine:  No  Pneumonia vaccine:  Yes: pneumovax    Smoking and Alcohol use:  Social History     Tobacco Use     Smoking status: Never Smoker     Smokeless tobacco: Never Used   Substance Use Topics     Alcohol use: No     Alcohol/week: 0.0 oz     Drug use: No       Foot Exam: Yes: 18  Eye Exam:  Yes: 18  Dental Exam:  Yes: last month  Aspirin Use:  Yes: 81 mg    Lab Results   Component Value Date    A1C 8.1 2019    A1C 8.5 2019    A1C 8.3 2018    A1C 8.3 08/10/2018    A1C 7.7 2017       Lab Results   Component Value Date    MICROL 8 2019     No results found for: MICROALBUMIN    Rosie Francis CMA  [facility]  [place]

## 2019-09-19 DIAGNOSIS — E03.9 HYPOTHYROIDISM, UNSPECIFIED TYPE: ICD-10-CM

## 2019-09-19 RX ORDER — LEVOTHYROXINE SODIUM 112 UG/1
TABLET ORAL
Qty: 90 TABLET | Refills: 0 | Status: SHIPPED | OUTPATIENT
Start: 2019-09-19 | End: 2019-12-17

## 2019-09-19 NOTE — TELEPHONE ENCOUNTER
"Requested Prescriptions   Pending Prescriptions Disp Refills     levothyroxine (SYNTHROID/LEVOTHROID) 112 MCG tablet [Pharmacy Med Name: LEVOTHYROXIN 112MCG TAB]    Last Written Prescription Date:  9/12/2018  Last Fill Quantity: 90,  # refills: 3   Last office visit: 1/4/2019 with prescribing provider:  Jossie Luciano     Future Office Visit:     90 tablet 3     Sig: TAKE 1 TABLET BY MOUTH ONCE DAILY AND  1/2  TABLET  ON  SUNDAYS       Thyroid Protocol Passed - 9/19/2019 12:35 PM        Passed - Patient is 12 years or older        Passed - Recent (12 mo) or future (30 days) visit within the authorizing provider's specialty     Patient had office visit in the last 12 months or has a visit in the next 30 days with authorizing provider or within the authorizing provider's specialty.  See \"Patient Info\" tab in inbasket, or \"Choose Columns\" in Meds & Orders section of the refill encounter.              Passed - Medication is active on med list        Passed - Normal TSH on file in past 12 months     Recent Labs   Lab Test 07/23/19  1301   TSH 1.14              Passed - No active pregnancy on record     If patient is pregnant or has had a positive pregnancy test, please check TSH.          Passed - No positive pregnancy test in past 12 months     If patient is pregnant or has had a positive pregnancy test, please check TSH.            "

## 2019-10-23 DIAGNOSIS — E11.9 TYPE 2 DIABETES MELLITUS WITHOUT COMPLICATION, WITHOUT LONG-TERM CURRENT USE OF INSULIN (H): ICD-10-CM

## 2019-10-24 NOTE — TELEPHONE ENCOUNTER
LOV with Endo was on 8/5/19. Prescription approved per [facility] Refill Protocol.    Humera, RN  Triage Nurse

## 2019-11-12 ENCOUNTER — TRANSFERRED RECORDS (OUTPATIENT)
Dept: HEALTH INFORMATION MANAGEMENT | Facility: CLINIC | Age: 74
End: 2019-11-12

## 2019-11-12 LAB — RETINOPATHY: NEGATIVE

## 2019-12-17 DIAGNOSIS — E03.9 HYPOTHYROIDISM, UNSPECIFIED TYPE: ICD-10-CM

## 2019-12-17 RX ORDER — LEVOTHYROXINE SODIUM 112 UG/1
TABLET ORAL
Qty: 90 TABLET | Refills: 1 | Status: SHIPPED | OUTPATIENT
Start: 2019-12-17 | End: 2020-07-13

## 2019-12-17 NOTE — TELEPHONE ENCOUNTER
"Prescription approved per [facility], [facility] or [facility] refill protocol.  Helen TERRY - Registered Nurse  [facility]  Acute and Diagnostic Services        Requested Prescriptions   Pending Prescriptions Disp Refills     levothyroxine (SYNTHROID/LEVOTHROID) 112 MCG tablet [Pharmacy Med Name: Levothyroxine Sodium 112 MCG Oral Tablet]  0     Sig: TAKE 1 TABLET BY MOUTH ONCE DAILY AND  1/2  TABLET  ON  SUNDAYS       Thyroid Protocol Passed - 12/17/2019  4:00 PM        Passed - Patient is 12 years or older        Passed - Recent (12 mo) or future (30 days) visit within the authorizing provider's specialty     Patient has had an office visit with the authorizing provider or a provider within the authorizing providers department within the previous 12 mos or has a future within next 30 days. See \"Patient Info\" tab in inbasket, or \"Choose Columns\" in Meds & Orders section of the refill encounter.              Passed - Medication is active on med list        Passed - Normal TSH on file in past 12 months     Recent Labs   Lab Test 07/23/19  1301   TSH 1.14              Passed - No active pregnancy on record     If patient is pregnant or has had a positive pregnancy test, please check TSH.          Passed - No positive pregnancy test in past 12 months     If patient is pregnant or has had a positive pregnancy test, please check TSH.            "

## 2020-01-08 ENCOUNTER — TRANSFERRED RECORDS (OUTPATIENT)
Dept: HEALTH INFORMATION MANAGEMENT | Facility: CLINIC | Age: 75
End: 2020-01-08

## 2020-01-14 ENCOUNTER — OFFICE VISIT (OUTPATIENT)
Dept: PEDIATRICS | Facility: CLINIC | Age: 75
End: 2020-01-14
Payer: MEDICARE

## 2020-01-14 VITALS
DIASTOLIC BLOOD PRESSURE: 60 MMHG | WEIGHT: 194.9 LBS | HEART RATE: 94 BPM | TEMPERATURE: 97.9 F | BODY MASS INDEX: 34.8 KG/M2 | RESPIRATION RATE: 18 BRPM | SYSTOLIC BLOOD PRESSURE: 152 MMHG | OXYGEN SATURATION: 99 %

## 2020-01-14 DIAGNOSIS — E03.8 OTHER SPECIFIED HYPOTHYROIDISM: ICD-10-CM

## 2020-01-14 DIAGNOSIS — E11.65 TYPE 2 DIABETES MELLITUS WITH HYPERGLYCEMIA, WITHOUT LONG-TERM CURRENT USE OF INSULIN (H): ICD-10-CM

## 2020-01-14 DIAGNOSIS — E78.2 MIXED HYPERLIPIDEMIA: ICD-10-CM

## 2020-01-14 DIAGNOSIS — I10 ESSENTIAL HYPERTENSION: ICD-10-CM

## 2020-01-14 DIAGNOSIS — E21.3 HYPERPARATHYROIDISM (H): ICD-10-CM

## 2020-01-14 DIAGNOSIS — R79.9 ABNORMAL FINDING OF BLOOD CHEMISTRY, UNSPECIFIED: ICD-10-CM

## 2020-01-14 DIAGNOSIS — I25.10 ASCVD (ARTERIOSCLEROTIC CARDIOVASCULAR DISEASE): Primary | ICD-10-CM

## 2020-01-14 DIAGNOSIS — E11.9 TYPE 2 DIABETES MELLITUS WITHOUT COMPLICATION, WITHOUT LONG-TERM CURRENT USE OF INSULIN (H): ICD-10-CM

## 2020-01-14 DIAGNOSIS — M79.89 SWELLING OF BOTH LOWER EXTREMITIES: ICD-10-CM

## 2020-01-14 DIAGNOSIS — L60.8 CHANGE IN NAIL APPEARANCE: ICD-10-CM

## 2020-01-14 LAB
ERYTHROCYTE [DISTWIDTH] IN BLOOD BY AUTOMATED COUNT: 12.1 % (ref 10–15)
HBA1C MFR BLD: 8.5 % (ref 0–5.6)
HCT VFR BLD AUTO: 37.9 % (ref 35–47)
HGB BLD-MCNC: 12.5 G/DL (ref 11.7–15.7)
MCH RBC QN AUTO: 29.9 PG (ref 26.5–33)
MCHC RBC AUTO-ENTMCNC: 33 G/DL (ref 31.5–36.5)
MCV RBC AUTO: 91 FL (ref 78–100)
PLATELET # BLD AUTO: 217 10E9/L (ref 150–450)
RBC # BLD AUTO: 4.18 10E12/L (ref 3.8–5.2)
WBC # BLD AUTO: 6.5 10E9/L (ref 4–11)

## 2020-01-14 PROCEDURE — 84443 ASSAY THYROID STIM HORMONE: CPT | Performed by: PEDIATRICS

## 2020-01-14 PROCEDURE — 85027 COMPLETE CBC AUTOMATED: CPT | Performed by: PEDIATRICS

## 2020-01-14 PROCEDURE — 83540 ASSAY OF IRON: CPT | Performed by: PEDIATRICS

## 2020-01-14 PROCEDURE — 36415 COLL VENOUS BLD VENIPUNCTURE: CPT | Performed by: PEDIATRICS

## 2020-01-14 PROCEDURE — 83550 IRON BINDING TEST: CPT | Performed by: PEDIATRICS

## 2020-01-14 PROCEDURE — 99214 OFFICE O/P EST MOD 30 MIN: CPT | Performed by: PEDIATRICS

## 2020-01-14 PROCEDURE — 83036 HEMOGLOBIN GLYCOSYLATED A1C: CPT | Performed by: PEDIATRICS

## 2020-01-14 PROCEDURE — 80053 COMPREHEN METABOLIC PANEL: CPT | Performed by: PEDIATRICS

## 2020-01-14 PROCEDURE — 82043 UR ALBUMIN QUANTITATIVE: CPT | Performed by: PEDIATRICS

## 2020-01-14 RX ORDER — METFORMIN HCL 500 MG
500 TABLET, EXTENDED RELEASE 24 HR ORAL DAILY
Qty: 180 TABLET | Refills: 1 | Status: SHIPPED | OUTPATIENT
Start: 2020-01-14 | End: 2021-05-17

## 2020-01-14 RX ORDER — ATORVASTATIN CALCIUM 40 MG/1
40 TABLET, FILM COATED ORAL DAILY
Qty: 90 TABLET | Refills: 3 | Status: SHIPPED | OUTPATIENT
Start: 2020-01-14 | End: 2021-01-21

## 2020-01-14 RX ORDER — NITROGLYCERIN 0.4 MG/1
0.4 TABLET SUBLINGUAL EVERY 5 MIN PRN
Qty: 25 TABLET | Refills: 1 | Status: SHIPPED | OUTPATIENT
Start: 2020-01-14 | End: 2021-04-26

## 2020-01-14 NOTE — PROGRESS NOTES
Subjective     Margaret Ramos is a 74 year old female who presents to clinic today for the following health issues:    HPI     Patient states that she has a concerning rash on her upper lower back. Patient states that she itches the area often, patient states that the rash has gone down past her shoulder.     Patient states that she doesn't want to see cardiologist anymore, needs medications refilled. Patient also has some other concerns she would like to go over.     Diabetes - has been following with Dr Shannon - next visit in March.  Has been checking blood sugar - likes to stay a bit higher in the range.  If blood sugar is high, takes a bit more glyburide.  No new neuropathy.   Not interested in adding additional medication at this time.      Getting more issues with swelling in feet.  May have liberalized her salt intake.  Denies new chest pain/palpitations.    Hx of MI, ASCVD - previously has followed with Dr Hilario.  Needs refill of her statin.  Has not used her nitroglycerin.  No new cardiac symptoms.  Declines ACEI/ARB.  Continues on statin, asa.  Declines additional bp agents.    Always constipated - not new - chronic issues    HTN - reports ambulatory blood pressure monitor in the [place] system years ago showed a normal bp and that her blood pressure elevations in clinic are related to white coat hypertension.    Skin issues have been resolved.  Followed recently with Dr Guzmán and has upcoming follow up visit to address warts    Osteoporosis in setting of hyperparathyroidism - following with Dr Shannon and has labs and office visit in 2 months.    Incontinence issues - wears a pad daily.  Describes urge incontinence issues when she arrives home from being out.  Aware of treatment options, wishes to monitor for now.    Wart on left 4th finger and lateral left foot - following with Dr Guzmán, interested in some home treatment options.    Left ear problems - itchy, popping - wants examined today.    Toenail  care - wondering who can do this          Reviewed and updated as needed this visit by Provider         Review of Systems   ROS COMP: Constitutional, HEENT, cardiovascular, pulmonary, gi and gu, endo, derm systems are negative, except as otherwise noted.      Objective    BP (!) 152/60 (BP Location: Right arm, Patient Position: Sitting, Cuff Size: Adult Large)   Pulse 94   Temp 97.9  F (36.6  C) (Tympanic)   Resp 18   Wt 88.4 kg (194 lb 14.4 oz)   LMP  (LMP Unknown)   SpO2 99%   BMI 34.80 kg/m    Body mass index is 34.8 kg/m .  Physical Exam   GENERAL: healthy, alert and no distress  HENT: ear canals and TM's normal  NECK: JVP not elevated  RESP: lungs clear to auscultation - no rales, rhonchi or wheezes  CV: regular rates and rhythm, normal S1 S2, no S3 or S4, no murmur, click or rub, peripheral pulses strong and 1+ bilateral lower extremity pitting edema to lower shin    SKIN: small verrucous flesh colored papule right ring finger, lateral left foot, ridge nails  PSYCH: mentation appears normal, affect normal/bright    Diagnostic Test Results:  Labs reviewed in Epic  Additional labs pending        Assessment & Plan       ICD-10-CM    1. ASCVD (arteriosclerotic cardiovascular disease) I25.10 atorvastatin (LIPITOR) 40 MG tablet     nitroGLYcerin (NITROSTAT) 0.4 MG sublingual tablet     Lipid panel reflex to direct LDL Fasting     Echocardiogram Complete    Plan repeat echocardiogram to investigate new lower extremity swelling issues and continue the medications that she is willing to take.       2. Hyperparathyroidism (H) E21.3 Comprehensive metabolic panel  Follow up as planned with Dr Shannon in endocrinology, appreciate assistance with management greatly     3. Type 2 diabetes mellitus with hyperglycemia, without long-term current use of insulin (H) E11.65 Albumin Random Urine Quantitative with Creat Ratio     Comprehensive metabolic panel     Hemoglobin A1c     atorvastatin (LIPITOR) 40 MG tablet      "Lipid panel reflex to direct LDL Fasting    Recheck control today, patient concerned about hypoglycemia and does not want to add any more diabetes control agents     4. Other specified hypothyroidism E03.8 TSH with free T4 reflex    Recheck labs and adjust as needed     5. Essential hypertension I10 Lipid panel reflex to direct LDL Fasting     Echocardiogram Complete    Patient reports normal bp levels at home, declined bp medications     6. Mixed hyperlipidemia (DYSLIPIDEMIA) E78.2 Comprehensive metabolic panel     atorvastatin (LIPITOR) 40 MG tablet    Continue statin     7. Type 2 diabetes mellitus without complication, without long-term current use of insulin (H) E11.9 metFORMIN (GLUCOPHAGE-XR) 500 MG 24 hr tablet    Recheck labs today   8. Change in nail appearance L60.8 Iron and iron binding capacity     CBC with platelets   9. Abnormal finding of blood chemistry, unspecified  R79.9 Iron and iron binding capacity   10. Swelling of both lower extremities M79.89 Echocardiogram Complete        BMI:   Estimated body mass index is 34.8 kg/m  as calculated from the following:    Height as of 8/5/19: 1.594 m (5' 2.75\").    Weight as of this encounter: 88.4 kg (194 lb 14.4 oz).   Weight management plan: Discussed healthy diet and exercise guidelines        See Patient Instructions    Jossie Luciano MD  [facility] [place]    "

## 2020-01-14 NOTE — PATIENT INSTRUCTIONS
Lab work today - stop on the first floor on your way out    Refills that you needed were sent to the pharmacy    Please refer to our paper tip sheet!    Keep your visit with Dr Shannon in March    Come in to see Dr Ramirez in May.    My schedulers can help you set up a repeat heart echocardiogram.

## 2020-01-14 NOTE — LETTER
[facility]  [address] [place]  [place] [address]                  [phone]   January 16, 2020    Margaret Ramos  [address] [place] [address] [address] [place] [address]      Dear Margaret,     Please find your recent lab results enclosed for your review and records.     The results show similar results to those that we have followed in the past.     Results of your blood counts, iron stores, thyroid function, kidney function, and liver function are normal.     Your HgbA1C is stable at 8.5%.       Results of your calcium level remain slightly elevated.   Please keep your follow up visit with Dr Shannon to review this again in March.     A new finding is that you are losing a small amount of protein in your urine.   This is related to your blood sugars being high over time.   We will continue to follow this and discuss it at subsequent visits.     Best regards,     Jossie Luciano MD   Internal Medicine - Pediatrics         Results for orders placed or performed in visit on 01/14/20   Albumin Random Urine Quantitative with Creat Ratio     Status: Abnormal   Result Value Ref Range    Creatinine Urine 30 mg/dL    Albumin Urine mg/L 10 mg/L    Albumin Urine mg/g Cr 34.24 (H) 0 - 25 mg/g Cr   Comprehensive metabolic panel     Status: Abnormal   Result Value Ref Range    Sodium 138 133 - 144 mmol/L    Potassium 4.7 3.4 - 5.3 mmol/L    Chloride 105 94 - 109 mmol/L    Carbon Dioxide 24 20 - 32 mmol/L    Anion Gap 9 3 - 14 mmol/L    Glucose 219 (H) 70 - 99 mg/dL    Urea Nitrogen 17 7 - 30 mg/dL    Creatinine 0.67 0.52 - 1.04 mg/dL    GFR Estimate 86 >60 mL/min/[1.73_m2]    GFR Estimate If Black >90 >60 mL/min/[1.73_m2]    Calcium 10.4 (H) 8.5 - 10.1 mg/dL    Bilirubin Total 0.4 0.2 - 1.3 mg/dL    Albumin 3.4 3.4 - 5.0 g/dL    Protein Total 6.9 6.8 - 8.8 g/dL    Alkaline Phosphatase 108 40 - 150 U/L    ALT 30 0 - 50 U/L    AST 21 0 - 45 U/L   Hemoglobin A1c     Status: Abnormal   Result Value Ref Range     Hemoglobin A1C 8.5 (H) 0 - 5.6 %   TSH with free T4 reflex     Status: None   Result Value Ref Range    TSH 0.76 0.40 - 4.00 mU/L   Iron and iron binding capacity     Status: None   Result Value Ref Range    Iron 117 35 - 180 ug/dL    Iron Binding Cap 266 240 - 430 ug/dL    Iron Saturation Index 44 15 - 46 %   CBC with platelets     Status: None   Result Value Ref Range    WBC 6.5 4.0 - 11.0 10e9/L    RBC Count 4.18 3.8 - 5.2 10e12/L    Hemoglobin 12.5 11.7 - 15.7 g/dL    Hematocrit 37.9 35.0 - 47.0 %    MCV 91 78 - 100 fl    MCH 29.9 26.5 - 33.0 pg    MCHC 33.0 31.5 - 36.5 g/dL    RDW 12.1 10.0 - 15.0 %    Platelet Count 217 150 - 450 10e9/L

## 2020-01-15 LAB
ALBUMIN SERPL-MCNC: 3.4 G/DL (ref 3.4–5)
ALP SERPL-CCNC: 108 U/L (ref 40–150)
ALT SERPL W P-5'-P-CCNC: 30 U/L (ref 0–50)
ANION GAP SERPL CALCULATED.3IONS-SCNC: 9 MMOL/L (ref 3–14)
AST SERPL W P-5'-P-CCNC: 21 U/L (ref 0–45)
BILIRUB SERPL-MCNC: 0.4 MG/DL (ref 0.2–1.3)
BUN SERPL-MCNC: 17 MG/DL (ref 7–30)
CALCIUM SERPL-MCNC: 10.4 MG/DL (ref 8.5–10.1)
CHLORIDE SERPL-SCNC: 105 MMOL/L (ref 94–109)
CO2 SERPL-SCNC: 24 MMOL/L (ref 20–32)
CREAT SERPL-MCNC: 0.67 MG/DL (ref 0.52–1.04)
CREAT UR-MCNC: 30 MG/DL
GFR SERPL CREATININE-BSD FRML MDRD: 86 ML/MIN/{1.73_M2}
GLUCOSE SERPL-MCNC: 219 MG/DL (ref 70–99)
IRON SATN MFR SERPL: 44 % (ref 15–46)
IRON SERPL-MCNC: 117 UG/DL (ref 35–180)
MICROALBUMIN UR-MCNC: 10 MG/L
MICROALBUMIN/CREAT UR: 34.24 MG/G CR (ref 0–25)
POTASSIUM SERPL-SCNC: 4.7 MMOL/L (ref 3.4–5.3)
PROT SERPL-MCNC: 6.9 G/DL (ref 6.8–8.8)
SODIUM SERPL-SCNC: 138 MMOL/L (ref 133–144)
TIBC SERPL-MCNC: 266 UG/DL (ref 240–430)
TSH SERPL DL<=0.005 MIU/L-ACNC: 0.76 MU/L (ref 0.4–4)

## 2020-01-29 ENCOUNTER — TRANSFERRED RECORDS (OUTPATIENT)
Dept: HEALTH INFORMATION MANAGEMENT | Facility: CLINIC | Age: 75
End: 2020-01-29

## 2020-03-10 DIAGNOSIS — E83.52 HYPERCALCEMIA: ICD-10-CM

## 2020-03-10 DIAGNOSIS — E11.65 TYPE 2 DIABETES MELLITUS WITH HYPERGLYCEMIA, WITHOUT LONG-TERM CURRENT USE OF INSULIN (H): ICD-10-CM

## 2020-03-10 LAB
CA-I SERPL ISE-MCNC: 5.6 MG/DL (ref 4.4–5.2)
DEPRECATED CALCIDIOL+CALCIFEROL SERPL-MC: 41 UG/L (ref 20–75)
HBA1C MFR BLD: 8.1 % (ref 0–5.6)
PTH-INTACT SERPL-MCNC: 96 PG/ML (ref 18–80)

## 2020-03-10 PROCEDURE — 83036 HEMOGLOBIN GLYCOSYLATED A1C: CPT | Performed by: INTERNAL MEDICINE

## 2020-03-10 PROCEDURE — 82565 ASSAY OF CREATININE: CPT | Performed by: INTERNAL MEDICINE

## 2020-03-10 PROCEDURE — 82310 ASSAY OF CALCIUM: CPT | Performed by: INTERNAL MEDICINE

## 2020-03-10 PROCEDURE — 83970 ASSAY OF PARATHORMONE: CPT | Performed by: INTERNAL MEDICINE

## 2020-03-10 PROCEDURE — 82306 VITAMIN D 25 HYDROXY: CPT | Performed by: INTERNAL MEDICINE

## 2020-03-10 PROCEDURE — 36415 COLL VENOUS BLD VENIPUNCTURE: CPT | Performed by: INTERNAL MEDICINE

## 2020-03-10 PROCEDURE — 82330 ASSAY OF CALCIUM: CPT | Performed by: INTERNAL MEDICINE

## 2020-03-11 LAB
CALCIUM SERPL-MCNC: 10.4 MG/DL (ref 8.5–10.1)
CREAT SERPL-MCNC: 0.66 MG/DL (ref 0.52–1.04)
GFR SERPL CREATININE-BSD FRML MDRD: 86 ML/MIN/{1.73_M2}

## 2020-03-26 DIAGNOSIS — E11.9 TYPE 2 DIABETES MELLITUS WITHOUT COMPLICATION, WITHOUT LONG-TERM CURRENT USE OF INSULIN (H): ICD-10-CM

## 2020-03-27 NOTE — TELEPHONE ENCOUNTER
"  Requested Prescriptions   Pending Prescriptions Disp Refills     metFORMIN (GLUCOPHAGE) 500 MG tablet [Pharmacy Med Name: metFORMIN HCl 500 MG Oral Tablet] 360 tablet 0     Sig: TAKE 1 & 1/2 TABLET IN THE MORNING, AND 1/2 TABLET AT LUNCH, AND 1/2 TABLET IN THE EVENING   Last Written Prescription Date:  1/14/2020  Last Fill Quantity: 180,  # refills: 0   Last office visit: 1/14/2020 with prescribing provider:     Future Office Visit:        Biguanide Agents Passed - 3/26/2020  4:44 PM        Passed - Patient is age 10 or older        Passed - Patient has documented A1c within the specified period of time.     If HgbA1C is 8 or greater, it needs to be on file within the past 3 months.  If less than 8, must be on file within the past 6 months.     Recent Labs   Lab Test 03/10/20  1334   A1C 8.1*             Passed - Patient's CR is NOT>1.4 OR Patient's EGFR is NOT<45 within past 12 mos.     Recent Labs   Lab Test 03/10/20  1334   GFRESTIMATED 86   GFRESTBLACK >90       Recent Labs   Lab Test 03/10/20  1334   CR 0.66             Passed - Patient does NOT have a diagnosis of CHF.        Passed - Medication is active on med list        Passed - Patient is not pregnant        Passed - Patient has not had a positive pregnancy test within the past 12 mos.         Passed - Recent (6 mo) or future (30 days) visit within the authorizing provider's specialty     Patient had office visit in the last 6 months or has a visit in the next 30 days with authorizing provider or within the authorizing provider's specialty.  See \"Patient Info\" tab in inbasket, or \"Choose Columns\" in Meds & Orders section of the refill encounter.             Prescription approved per [facility] Refill Protocol.  Wanda Kang RN    "

## 2020-04-06 ENCOUNTER — TELEPHONE (OUTPATIENT)
Dept: ENDOCRINOLOGY | Facility: CLINIC | Age: 75
End: 2020-04-06

## 2020-04-06 NOTE — TELEPHONE ENCOUNTER
ALEXANDRE from [facility] for lancets.    LAST OFFICE/VIRTUAL VISIT:  08/05/19    FUTURE OFFICE/VIRTUAL VISIT:  None    Lab Results   Component Value Date    A1C 8.1 03/10/2020    A1C 8.5 01/14/2020    A1C 8.1 07/23/2019    A1C 8.5 04/17/2019    A1C 8.3 12/11/2018         Rosie Francis CMA  [facility]  [place]

## 2020-04-09 NOTE — TELEPHONE ENCOUNTER
Forms/paperwork reviewed, completed and signed.  Please fax or send the papers as requested, document in chart and close the encounter.    Thank you.    Rosario Shannon MD

## 2020-04-09 NOTE — TELEPHONE ENCOUNTER
Form was faxed, copied, sent to scanning.      Rosie Francis, [facility]  [place]

## 2020-04-16 NOTE — TELEPHONE ENCOUNTER
Form was faxed, copied, sent to scanning.      Rosie Francis, [facility]  [place]

## 2020-05-07 ENCOUNTER — TELEPHONE (OUTPATIENT)
Dept: ENDOCRINOLOGY | Facility: CLINIC | Age: 75
End: 2020-05-07

## 2020-05-07 NOTE — TELEPHONE ENCOUNTER
Patient calling  She has had very high blood sugars all week  Please advise  Ok to call and navarro [phone]

## 2020-05-07 NOTE — TELEPHONE ENCOUNTER
Call to patient, patient states that her message does not have to do with her it is actually regarding her sister Jane Ma. Patient states she has tried to call multiple time regarding her sister and has been hung-up on but that it is emergent that someone take her message. Patient states numerous issue regarding Jane's care, patient has written a list of concerns including Jane's high blood sugars, missing her insulin and having memory issues and forgetfulness. Writer explained to patient that we do not have consent to communicate on file to speak to her regarding Jane's care. Patient continues on to state that her sisters blood sugars tonight were [phone]. Patient is concerned about her sisters safety. Advised patient that if Jane is in an emergency situation, she should be taken to the emergency room but due to current pandemic situation patient will not be allowed in as a visitor. Patient continues to state that she is worried about Jane, writer advised  and that this would only be able to be ordered through the primary care provider. Patient is insistent on getting her sister Jane, help and states that Jane has memory issues that would prevent her from stating the truth regarding her medications and activity. Patient states that Jane sleeps all day and is unable to remember to take her medications, patient calls jane and reminds her to take her medications daily. She also visits jane daily to care for her but feels like she is unable to do this effectively throughout the day. Writer feels that  is the best approach to situation and encouraged patient to reach out to Jane's primary care provider. Writer is unable to discuss any of Jane's medical advice due to no consent to communicate on file. Writer will create phone encounter for Jane and route to jane's primary care provider to be advised.

## 2020-05-08 NOTE — TELEPHONE ENCOUNTER
Addressed in other chart. Appreciate TC/Phone staff's respect of our patient's privacy and following consent to communicates - thank you!    KATINA Reagan MD  Internal Medicine-Pediatrics

## 2020-07-14 DIAGNOSIS — E11.9 TYPE 2 DIABETES MELLITUS WITHOUT COMPLICATION, WITHOUT LONG-TERM CURRENT USE OF INSULIN (H): ICD-10-CM

## 2020-07-14 DIAGNOSIS — E11.65 TYPE 2 DIABETES MELLITUS WITH HYPERGLYCEMIA (H): ICD-10-CM

## 2020-07-14 DIAGNOSIS — L30.4 INTERTRIGO: Primary | ICD-10-CM

## 2020-07-15 RX ORDER — KETOCONAZOLE 20 MG/G
CREAM TOPICAL 2 TIMES DAILY
Qty: 60 G | Refills: 1 | Status: SHIPPED | OUTPATIENT
Start: 2020-07-15 | End: 2024-01-31

## 2020-07-15 NOTE — TELEPHONE ENCOUNTER
Routing refill request to provider for review/approval because:  Last script is from 2015.     Nurys Collins, RN   [facility] -- Triage Nurse

## 2020-11-17 ENCOUNTER — TRANSFERRED RECORDS (OUTPATIENT)
Dept: HEALTH INFORMATION MANAGEMENT | Facility: CLINIC | Age: 75
End: 2020-11-17

## 2020-12-27 DIAGNOSIS — E11.9 TYPE 2 DIABETES MELLITUS WITHOUT COMPLICATION, WITHOUT LONG-TERM CURRENT USE OF INSULIN (H): ICD-10-CM

## 2020-12-30 RX ORDER — BLOOD SUGAR DIAGNOSTIC
STRIP MISCELLANEOUS
Qty: 400 STRIP | Refills: 11 | Status: SHIPPED | OUTPATIENT
Start: 2020-12-30 | End: 2022-02-04

## 2020-12-30 NOTE — TELEPHONE ENCOUNTER
Routing refill request to provider for review/approval because:  Last office visit >6 months ago (1/14/20)

## 2021-01-08 ENCOUNTER — TELEPHONE (OUTPATIENT)
Dept: PEDIATRICS | Facility: CLINIC | Age: 76
End: 2021-01-08

## 2021-01-08 ENCOUNTER — TELEPHONE (OUTPATIENT)
Dept: ENDOCRINOLOGY | Facility: CLINIC | Age: 76
End: 2021-01-08

## 2021-01-08 DIAGNOSIS — E83.52 HYPERCALCEMIA: ICD-10-CM

## 2021-01-08 DIAGNOSIS — E11.65 TYPE 2 DIABETES MELLITUS WITH HYPERGLYCEMIA, WITHOUT LONG-TERM CURRENT USE OF INSULIN (H): Primary | ICD-10-CM

## 2021-01-08 DIAGNOSIS — E21.3 HYPERPARATHYROIDISM (H): ICD-10-CM

## 2021-01-08 DIAGNOSIS — E03.8 OTHER SPECIFIED HYPOTHYROIDISM: ICD-10-CM

## 2021-01-08 NOTE — TELEPHONE ENCOUNTER
Reason for call:  Other   Patient called regarding (reason for call): call back  Additional comments: patient would to know if her orders could be updated -she would like to have her lab work done on 1/15/21    Phone number to reach patient:  Home number on file [phone] (home)    Best Time:  Anytime     Can we leave a detailed message on this number?  YES    Travel screening: Not Applicable

## 2021-01-08 NOTE — TELEPHONE ENCOUNTER
Reason for call:  Other   Patient called regarding (reason for call): call back  Additional comments: Patient is asking if she could get lab order and information on osteoporosis , she states her sister is getting her lab work on 1.15.20. she would like to have the order done as soon as possible     Phone number to reach patient:  Home number on file [phone] (home) or [phone]    Best Time:  anytime    Can we leave a detailed message on this number?  YES    Travel screening: Not Applicable

## 2021-01-11 NOTE — TELEPHONE ENCOUNTER
Labs placed.  Last visit 3/2020.  Follow-up of diabetes, hypothyroidism, hypercalcemia and hyperparathyroidism.

## 2021-01-11 NOTE — TELEPHONE ENCOUNTER
Called patient and LVM to return phone call to schedule lab only appointment.     Taya Parkinson, CMA

## 2021-01-11 NOTE — TELEPHONE ENCOUNTER
Pended possible labs - routing to PCP to sign off on orders, then route back to team to schedule lab appointment.     Taya Parkinson, CMA

## 2021-01-15 DIAGNOSIS — E83.52 HYPERCALCEMIA: ICD-10-CM

## 2021-01-15 DIAGNOSIS — E03.8 OTHER SPECIFIED HYPOTHYROIDISM: ICD-10-CM

## 2021-01-15 DIAGNOSIS — E11.65 TYPE 2 DIABETES MELLITUS WITH HYPERGLYCEMIA, WITHOUT LONG-TERM CURRENT USE OF INSULIN (H): ICD-10-CM

## 2021-01-15 DIAGNOSIS — E21.3 HYPERPARATHYROIDISM (H): ICD-10-CM

## 2021-01-15 LAB
CREAT UR-MCNC: 85 MG/DL
HBA1C MFR BLD: 8.7 % (ref 0–5.6)
MICROALBUMIN UR-MCNC: 14 MG/L
MICROALBUMIN/CREAT UR: 17.02 MG/G CR (ref 0–25)
PTH-INTACT SERPL-MCNC: 71 PG/ML (ref 18–80)

## 2021-01-15 PROCEDURE — 84439 ASSAY OF FREE THYROXINE: CPT | Performed by: PEDIATRICS

## 2021-01-15 PROCEDURE — 36415 COLL VENOUS BLD VENIPUNCTURE: CPT | Performed by: PEDIATRICS

## 2021-01-15 PROCEDURE — 84100 ASSAY OF PHOSPHORUS: CPT | Performed by: PEDIATRICS

## 2021-01-15 PROCEDURE — 84443 ASSAY THYROID STIM HORMONE: CPT | Performed by: PEDIATRICS

## 2021-01-15 PROCEDURE — 84443 ASSAY THYROID STIM HORMONE: CPT | Mod: 59 | Performed by: PEDIATRICS

## 2021-01-15 PROCEDURE — 83036 HEMOGLOBIN GLYCOSYLATED A1C: CPT | Performed by: PEDIATRICS

## 2021-01-15 PROCEDURE — 82043 UR ALBUMIN QUANTITATIVE: CPT | Performed by: INTERNAL MEDICINE

## 2021-01-15 PROCEDURE — 83735 ASSAY OF MAGNESIUM: CPT | Performed by: PEDIATRICS

## 2021-01-15 PROCEDURE — 80061 LIPID PANEL: CPT | Performed by: PEDIATRICS

## 2021-01-15 PROCEDURE — 83970 ASSAY OF PARATHORMONE: CPT | Performed by: PEDIATRICS

## 2021-01-15 PROCEDURE — 80053 COMPREHEN METABOLIC PANEL: CPT | Performed by: PEDIATRICS

## 2021-01-15 PROCEDURE — 82306 VITAMIN D 25 HYDROXY: CPT | Performed by: PEDIATRICS

## 2021-01-16 LAB
ALBUMIN SERPL-MCNC: 3.8 G/DL (ref 3.4–5)
ALP SERPL-CCNC: 142 U/L (ref 40–150)
ALT SERPL W P-5'-P-CCNC: 33 U/L (ref 0–50)
ANION GAP SERPL CALCULATED.3IONS-SCNC: 4 MMOL/L (ref 3–14)
AST SERPL W P-5'-P-CCNC: 22 U/L (ref 0–45)
BILIRUB SERPL-MCNC: 0.6 MG/DL (ref 0.2–1.3)
BUN SERPL-MCNC: 14 MG/DL (ref 7–30)
CALCIUM SERPL-MCNC: 10.3 MG/DL (ref 8.5–10.1)
CHLORIDE SERPL-SCNC: 106 MMOL/L (ref 94–109)
CHOLEST SERPL-MCNC: 309 MG/DL
CO2 SERPL-SCNC: 27 MMOL/L (ref 20–32)
CREAT SERPL-MCNC: 0.68 MG/DL (ref 0.52–1.04)
GFR SERPL CREATININE-BSD FRML MDRD: 85 ML/MIN/{1.73_M2}
GLUCOSE SERPL-MCNC: 198 MG/DL (ref 70–99)
HDLC SERPL-MCNC: 74 MG/DL
LDLC SERPL CALC-MCNC: 209 MG/DL
MAGNESIUM SERPL-MCNC: 2.2 MG/DL (ref 1.6–2.3)
NONHDLC SERPL-MCNC: 235 MG/DL
PHOSPHATE SERPL-MCNC: 3 MG/DL (ref 2.5–4.5)
POTASSIUM SERPL-SCNC: 4.1 MMOL/L (ref 3.4–5.3)
PROT SERPL-MCNC: 7.5 G/DL (ref 6.8–8.8)
SODIUM SERPL-SCNC: 137 MMOL/L (ref 133–144)
T4 FREE SERPL-MCNC: 1.12 NG/DL (ref 0.76–1.46)
TRIGL SERPL-MCNC: 128 MG/DL
TSH SERPL DL<=0.005 MIU/L-ACNC: 3.96 MU/L (ref 0.4–4)
TSH SERPL DL<=0.005 MIU/L-ACNC: 3.96 MU/L (ref 0.4–4)

## 2021-01-18 LAB — DEPRECATED CALCIDIOL+CALCIFEROL SERPL-MC: 39 UG/L (ref 20–75)

## 2021-01-21 ENCOUNTER — OFFICE VISIT (OUTPATIENT)
Dept: PEDIATRICS | Facility: CLINIC | Age: 76
End: 2021-01-21
Payer: COMMERCIAL

## 2021-01-21 VITALS
BODY MASS INDEX: 34.42 KG/M2 | WEIGHT: 182.3 LBS | HEART RATE: 78 BPM | OXYGEN SATURATION: 99 % | HEIGHT: 61 IN | DIASTOLIC BLOOD PRESSURE: 82 MMHG | TEMPERATURE: 98 F | SYSTOLIC BLOOD PRESSURE: 144 MMHG

## 2021-01-21 DIAGNOSIS — E21.3 HYPERPARATHYROIDISM (H): ICD-10-CM

## 2021-01-21 DIAGNOSIS — G47.33 OSA (OBSTRUCTIVE SLEEP APNEA): ICD-10-CM

## 2021-01-21 DIAGNOSIS — Z95.5 S/P DRUG ELUTING CORONARY STENT PLACEMENT: ICD-10-CM

## 2021-01-21 DIAGNOSIS — R06.09 DYSPNEA ON EXERTION: ICD-10-CM

## 2021-01-21 DIAGNOSIS — R00.2 PALPITATIONS: ICD-10-CM

## 2021-01-21 DIAGNOSIS — E03.9 HYPOTHYROIDISM, UNSPECIFIED TYPE: ICD-10-CM

## 2021-01-21 DIAGNOSIS — R26.89 BALANCE PROBLEMS: ICD-10-CM

## 2021-01-21 DIAGNOSIS — I25.10 ASCVD (ARTERIOSCLEROTIC CARDIOVASCULAR DISEASE): ICD-10-CM

## 2021-01-21 DIAGNOSIS — E78.2 MIXED HYPERLIPIDEMIA: ICD-10-CM

## 2021-01-21 DIAGNOSIS — J45.20 MILD INTERMITTENT ASTHMA, UNSPECIFIED WHETHER COMPLICATED: ICD-10-CM

## 2021-01-21 DIAGNOSIS — E11.65 TYPE 2 DIABETES MELLITUS WITH HYPERGLYCEMIA, WITHOUT LONG-TERM CURRENT USE OF INSULIN (H): Primary | ICD-10-CM

## 2021-01-21 DIAGNOSIS — I10 ESSENTIAL HYPERTENSION: ICD-10-CM

## 2021-01-21 DIAGNOSIS — E03.8 OTHER SPECIFIED HYPOTHYROIDISM: ICD-10-CM

## 2021-01-21 PROCEDURE — 99215 OFFICE O/P EST HI 40 MIN: CPT | Performed by: PEDIATRICS

## 2021-01-21 RX ORDER — LEVOTHYROXINE SODIUM 112 UG/1
TABLET ORAL
Qty: 90 TABLET | Refills: 3 | Status: SHIPPED | OUTPATIENT
Start: 2021-01-21 | End: 2022-01-25

## 2021-01-21 RX ORDER — ALBUTEROL SULFATE 0.83 MG/ML
2.5 SOLUTION RESPIRATORY (INHALATION) EVERY 6 HOURS PRN
Qty: 360 ML | Refills: 3 | Status: SHIPPED | OUTPATIENT
Start: 2021-01-21

## 2021-01-21 RX ORDER — ATORVASTATIN CALCIUM 40 MG/1
40 TABLET, FILM COATED ORAL DAILY
Qty: 90 TABLET | Refills: 3 | Status: SHIPPED | OUTPATIENT
Start: 2021-01-21 | End: 2022-01-25

## 2021-01-21 ASSESSMENT — MIFFLIN-ST. JEOR: SCORE: 1265.9

## 2021-01-21 NOTE — PATIENT INSTRUCTIONS
Keep wearing your compressions stockings    Follow up with Dr Shannon as planned    Expect a phone call to schedule heart rhythm monitor and a heart ultrasound    Continue your current levothyroxine script - sent to pharmacy    Restart your atorvastatin - this is crucial    Expect a phone call to schedule physical therapy for balance

## 2021-01-21 NOTE — PROGRESS NOTES
Assessment & Plan       ICD-10-CM    1. Type 2 diabetes mellitus with hyperglycemia, without long-term current use of insulin (H)  E11.65 atorvastatin (LIPITOR) 40 MG tablet    Need to restart statin - hasn't been taking and LDL elevated.   New script sent today.  REpeat labs at 6 month follow up.   2. Other specified hypothyroidism  E03.8 Well controlled, continue current medications     3. Mixed hyperlipidemia (DYSLIPIDEMIA)  E78.2 atorvastatin (LIPITOR) 40 MG tablet  Restart statin - see above   4. S/P drug eluting coronary stent placement  Z95.5 Continue aspirin   5. ASCVD (arteriosclerotic cardiovascular disease)  I25.10 atorvastatin (LIPITOR) 40 MG tablet     Echocardiogram Complete    Patient concern for increased LE edema - may be from decreased hours on CPAP at night due to her care responsibilities for her sister.  Will also check echo to evaluate heart function.   6. Hyperparathyroidism (H)  E21.3 Has upcoming visit with Dr Shannon, calcium just slightly elevated, but stable on recent labs   7. Essential hypertension  I10 White coat hypertension, normal bp at home, continue current medications   8. Dyspnea on exertion  R06.00 Echocardiogram Complete  Endorses more dyspnea on exertion than she has had historically - plan echo   9. Palpitations  R00.2 Holter Monitor 48 hour Adult Pediatric  Family hx of heart rhythm abnormalities and has reported more recent palpitations, plan holter   10. Hypothyroidism, unspecified type  E03.9 levothyroxine (SYNTHROID/LEVOTHROID) 112 MCG tablet  Well controlled, continue current medications     11. Mild intermittent asthma, unspecified whether complicated  J45.20 albuterol (PROVENTIL) (2.5 MG/3ML) 0.083% neb solution     Nebulizer and Supplies Order for DME - ONLY FOR DME   12. Balance problems  R26.89 PHYSICAL THERAPY REFERRAL  Recommended vestibular therapy for balance concerns   13. JASMINE (obstructive sleep apnea)  G47.33 Continue nightly CPAP             43 minutes  "spent on the date of the encounter doing chart review, review of test results, interpretation of tests, patient visit and documentation          BMI:   Estimated body mass index is 33.98 kg/m  as calculated from the following:    Height as of this encounter: 1.56 m (5' 1.42\").    Weight as of this encounter: 82.7 kg (182 lb 4.8 oz).   Weight management plan: Discussed healthy diet and exercise guidelines          Return in about 6 months (around 7/21/2021) for Follow up, with extender.    Jossie Luciano MD  [facility] [place] is a 75 year old who presents to clinic today for the following health issues     HPI       Medication Followup of ALL    Taking Medication as prescribed: yes    Side Effects:  None    Medication Helping Symptoms:  Unsure until lab review       Labs follow up, Meds follow up  Pt reports she has not been in for a year and is unsure specifically    Pt noted that she would like more information (print out) on osteoporosis and congestive heart failure. Pt does not have Internet access at home.    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  Putting herself on the back burner this year and reports neglecting her care.   Very tired - at her sister's house for much of the day and usually dealing with her concerns until late into the night.  Getting less sleep than previous.     Hx of ASCVD - s/p JORGE to RCA in 2016.  Previously followed by cardiology.  Has been getting palpitations related to increased stress.  Younger sister just had a pacemaker put in and has had CABG previously.   Patient concerned about heart now.  Stressful situations are the biggest trigger for her heart palpitations.  No chest pain or pressure associated with these episodes.  Has been getting more activity due to care duties.    BP elevated, pt reports that she gets anxious when going to clinic and has a diagnosis for years of white coat " "hypertension.    HTN - at home, runs 110's/60's - always high in clinic.   Has had full evaluation at [facility] with home monitoring.       HL - hasn't been on statin recently - open to restarting now.   Reviewed LDL above 200 on recent labs.    Hypothyroidism - well controlled on current prescription would like to continue.    Diabetes and hyperparathyroidism - follows with endocrinology and has visit coming soon    Osteoporosis related to hyperparathyroidism - wants additional osteoporosis information today.  Would be open to medication, but wishes to discuss with endocrinology after studying the information provided today.    Can't use albuterol inhaler due to preservatives - prefers nebulizer and needs refill.  No acute respiratory concerns.    Balance is getting worse - has fallen against her walls, but not all the way down.  If walking in larger areas, feels more unsteady.  Using a walking stick.  If looks around and moves her head, more apt to fall.       Continues on CPAP for JASMINE - uses CPAP for at least 4 hours per night and finds it helpful.  Does notice increased lower extremity swelling in ankles recently - worse towards end of day and hasn't been using CPAP for quite as many hours as usual.      Review of Systems   Constitutional, neuro, cardiovascular, pulmonary, gi and endo systems are negative, except as otherwise noted.      Objective    BP (!) 144/82   Pulse 78   Temp 98  F (36.7  C) (Tympanic)   Ht 1.56 m (5' 1.42\")   Wt 82.7 kg (182 lb 4.8 oz)   LMP  (LMP Unknown)   SpO2 99%   BMI 33.98 kg/m    Body mass index is 33.98 kg/m .   Wt Readings from Last 4 Encounters:   01/21/21 82.7 kg (182 lb 4.8 oz)   01/14/20 88.4 kg (194 lb 14.4 oz)   08/05/19 88.1 kg (194 lb 3.2 oz)   04/30/19 88.5 kg (195 lb)       Physical Exam   GENERAL: healthy, alert and no distress  RESP: lungs clear to auscultation - no rales, rhonchi or wheezes  CV: regular rate and rhythm, normal S1 S2, no S3 or S4, no murmur, click " or rub, no peripheral edema and peripheral pulses strong  MS: no gross musculoskeletal defects noted, no edema  SKIN: no suspicious lesions or rashes  NEURO: Normal strength and tone, mentation intact and speech normal  PSYCH: mentation appears normal and anxious    Reviewed recent lab results in detail.    Jossie Luciano MD

## 2021-01-27 ENCOUNTER — HOSPITAL ENCOUNTER (OUTPATIENT)
Dept: CARDIOLOGY | Facility: CLINIC | Age: 76
Discharge: HOME | End: 2021-01-27
Attending: PEDIATRICS | Admitting: PEDIATRICS
Payer: COMMERCIAL

## 2021-01-27 DIAGNOSIS — I25.10 ASCVD (ARTERIOSCLEROTIC CARDIOVASCULAR DISEASE): ICD-10-CM

## 2021-01-27 DIAGNOSIS — R06.09 DYSPNEA ON EXERTION: ICD-10-CM

## 2021-01-27 PROCEDURE — 93306 TTE W/DOPPLER COMPLETE: CPT

## 2021-01-27 PROCEDURE — 93306 TTE W/DOPPLER COMPLETE: CPT | Mod: 26 | Performed by: INTERNAL MEDICINE

## 2021-01-28 ENCOUNTER — HOSPITAL ENCOUNTER (OUTPATIENT)
Dept: CARDIOLOGY | Facility: CLINIC | Age: 76
Discharge: HOME | End: 2021-01-28
Attending: PEDIATRICS | Admitting: PEDIATRICS
Payer: COMMERCIAL

## 2021-01-28 DIAGNOSIS — R00.2 PALPITATIONS: ICD-10-CM

## 2021-01-28 PROCEDURE — 93227 XTRNL ECG REC<48 HR R&I: CPT | Performed by: INTERNAL MEDICINE

## 2021-01-28 PROCEDURE — 93225 XTRNL ECG REC<48 HRS REC: CPT

## 2021-02-18 ENCOUNTER — OFFICE VISIT (OUTPATIENT)
Dept: PEDIATRICS | Facility: CLINIC | Age: 76
End: 2021-02-18
Payer: COMMERCIAL

## 2021-02-18 VITALS
TEMPERATURE: 97.2 F | WEIGHT: 185.3 LBS | OXYGEN SATURATION: 99 % | SYSTOLIC BLOOD PRESSURE: 166 MMHG | RESPIRATION RATE: 18 BRPM | DIASTOLIC BLOOD PRESSURE: 72 MMHG | HEART RATE: 76 BPM | HEIGHT: 62 IN | BODY MASS INDEX: 34.1 KG/M2

## 2021-02-18 DIAGNOSIS — M79.645 CHRONIC PAIN OF BOTH THUMBS: ICD-10-CM

## 2021-02-18 DIAGNOSIS — G89.29 CHRONIC PAIN OF BOTH THUMBS: ICD-10-CM

## 2021-02-18 DIAGNOSIS — G56.03 BILATERAL CARPAL TUNNEL SYNDROME: ICD-10-CM

## 2021-02-18 DIAGNOSIS — R26.89 BALANCE PROBLEMS: ICD-10-CM

## 2021-02-18 DIAGNOSIS — E11.65 TYPE 2 DIABETES MELLITUS WITH HYPERGLYCEMIA, WITHOUT LONG-TERM CURRENT USE OF INSULIN (H): ICD-10-CM

## 2021-02-18 DIAGNOSIS — M79.644 CHRONIC PAIN OF BOTH THUMBS: ICD-10-CM

## 2021-02-18 DIAGNOSIS — Z00.00 ENCOUNTER FOR MEDICAL EXAMINATION TO ESTABLISH CARE: Primary | ICD-10-CM

## 2021-02-18 PROCEDURE — 99215 OFFICE O/P EST HI 40 MIN: CPT | Performed by: INTERNAL MEDICINE

## 2021-02-18 ASSESSMENT — MIFFLIN-ST. JEOR: SCORE: 1280.83

## 2021-02-18 NOTE — PATIENT INSTRUCTIONS
I highly recommend the balance therapy.     You will get a call to schedule the hand therapy and orthopedic visit.     Zaina Ramirez MD   Internal Medicine - Pediatrics

## 2021-02-18 NOTE — PROGRESS NOTES
"    Assessment & Plan     Encounter for medical examination to establish care  Has been with this clinic, looking for a provider with better availability.     Chronic pain of both thumbs  History and exam indicate likely strain from use. Recommend PT - she prefers to have exam by [facility] and then therapy through them.   - Orthopedic & Spine  Referral; Future    Balance problems  Does have cane at home, can use as needed. Refer for therapy.   - PHYSICAL THERAPY REFERRAL; Future    Bilateral carpal tunnel syndrome  Sleeps with wrist braces bilaterally.     Type 2 diabetes mellitus with hyperglycemia, without long-term current use of insulin (H)  Poor control. Discussed exercise, sleep, diet.  Will meet with Dr. Méndez next week.       42 minutes spent on the date of the encounter doing chart review, history and exam, documentation and further activities as noted above           Return in about 6 months (around 8/18/2021) for Follow up.    Zaina Ramirez MD  [facility] [place] is a 75 year old who presents for the following health issues     HPI       New Patient/Transfer of Care    Margaret presents today to establish care.  She has a number of different concerns today.     1. \"Bad thumbs\".  Finds herself holding it and pressing for relief. Both are painful, and have been bothering her since last summer when she did a lot of work with the gardening won. The pain is on the thenar eminance, and around the bottom of the thumb joint.     2. Interested in balance therapy. Feels herself with intermittent balance problems, not as stable. No falls, no significant events but generally a bit less stable.     3. Ankle swelling - has been using compression socks since before December. Some days better than other days. Makes the thumbs sore to get the compression socks on.      4. Hemoglobin A1c up to 8.7 last month, up from 8.1 a year ago. Sees Dr. Méndez next week - diabetes " "and thyroid managed by her.    5. 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 Generally has never been great at taking time to care for herself.  Gets adequate sleep some nights but not every night of the week. Cooks for her sister, and does cook for herself but does not always get regular meals. Not involved in therapy at this time, declines referral. Does not feel she has time for it.         Review of Systems   Constitutional, HEENT, cardiovascular, pulmonary, gi and gu systems are negative, except as otherwise noted.      Objective    BP (!) 166/72 (BP Location: Right arm, Patient Position: Sitting, Cuff Size: Adult Regular)   Pulse 76   Temp 97.2  F (36.2  C) (Tympanic)   Resp 18   Ht 1.562 m (5' 1.5\")   Wt 84.1 kg (185 lb 4.8 oz)   LMP  (LMP Unknown)   SpO2 99%   BMI 34.45 kg/m    Body mass index is 34.45 kg/m .  Physical Exam   GENERAL: healthy, alert and no distress  EYES: Eyes grossly normal to inspection, PERRL and conjunctivae and sclerae normal  HENT: ear canals and TM's normal, nose and mouth without ulcers or lesions  NECK: no adenopathy, no asymmetry, masses, or scars and thyroid normal to palpation  RESP: lungs clear to auscultation - no rales, rhonchi or wheezes  CV: regular rate and rhythm, normal S1 S2, no S3 or S4, no murmur, click or rub, no peripheral edema and peripheral pulses strong  MS: no gross musculoskeletal defects noted, no edema, point tenderness over thenar eminance bilaterally  NEURO: Normal strength and tone, mentation intact and speech normal  PSYCH: mentation appears normal, affect normal/bright                "

## 2021-02-23 ENCOUNTER — TELEPHONE (OUTPATIENT)
Dept: PEDIATRICS | Facility: CLINIC | Age: 76
End: 2021-02-23

## 2021-02-23 NOTE — TELEPHONE ENCOUNTER
Called patient and left a message requesting a call back to discuss.  Provided clinic number.  Upon return call, please transfer patient to Marylin ()- [phone].    Marylin Crouch

## 2021-02-23 NOTE — TELEPHONE ENCOUNTER
Marylin YUSUF doesn't show that pt received covid vaccine & no future appointment in place.     Please contact pt to see whether she had the covid vaccine already, if not please help her to schedule that appointment. Thanks.    Humera RN  Patient Advocate Liason (PAL)  [facility]

## 2021-02-24 DIAGNOSIS — E11.9 TYPE 2 DIABETES MELLITUS WITHOUT COMPLICATION, WITHOUT LONG-TERM CURRENT USE OF INSULIN (H): ICD-10-CM

## 2021-02-25 RX ORDER — GLYBURIDE 2.5 MG/1
2.5 TABLET ORAL 2 TIMES DAILY WITH MEALS
Qty: 270 TABLET | Refills: 3 | Status: SHIPPED | OUTPATIENT
Start: 2021-02-25 | End: 2024-01-31

## 2021-02-25 NOTE — TELEPHONE ENCOUNTER
Pending Prescriptions:                       Disp   Refills    glyBURIDE (DIABETA /MICRONASE) 2.5 MG tabl*270 ta*3        Sig: Take 1 tablet (2.5 mg) by mouth 2 times daily (with           meals)    Routing refill request to provider for review/approval because:  Patient needs to be seen because it has been more than 1 year since last office visit.

## 2021-03-10 ENCOUNTER — TELEPHONE (OUTPATIENT)
Dept: ENDOCRINOLOGY | Facility: CLINIC | Age: 76
End: 2021-03-10

## 2021-03-10 NOTE — TELEPHONE ENCOUNTER
Prior Authorization Retail Medication Request    Medication/Dose: GLYBURIDE 2.5 MG ORAL TABLET  ICD code (if different than what is on RX):    Previously Tried and Failed:    Rationale:      Insurance Name:    Insurance ID:  [id number]      Pharmacy Information (if different than what is on RX)  Name:  [facility]  Phone:  [phone]

## 2021-03-11 NOTE — TELEPHONE ENCOUNTER
PA Initiation    Medication: glyBURIDE (DIABETA /MICRONASE) 2.5 MG tablet   Insurance Company: [employer] - Phone [phone] Fax [phone]  Pharmacy Filling the Rx: [id number] - [place], [place] - [address]  Filling Pharmacy Phone: [phone]  Filling Pharmacy Fax: [phone]  Start Date: 3/11/2021

## 2021-03-12 NOTE — TELEPHONE ENCOUNTER
Prior Authorization Approval    Authorization Effective Date: 2/11/2021  Authorization Expiration Date: 3/11/2022  Medication: glyBURIDE (DIABETA /MICRONASE) 2.5 MG tablet--APPROVED  Approved Dose/Quantity:   Reference #:     Insurance Company: [employer] - Phone [phone] Fax [phone]  Expected CoPay:       CoPay Card Available:      Foundation Assistance Needed:    Which Pharmacy is filling the prescription (Not needed for infusion/clinic administered): [id number] - [place], [place] - [address] [facility] [address] [facility]  Pharmacy Notified: Yes  Patient Notified: Yes **Instructed pharmacy to notify patient when script is ready to /ship.**

## 2021-04-20 ENCOUNTER — DOCUMENTATION ONLY (OUTPATIENT)
Dept: LAB | Facility: CLINIC | Age: 76
End: 2021-04-20

## 2021-04-20 DIAGNOSIS — E21.3 HYPERPARATHYROIDISM (H): ICD-10-CM

## 2021-04-20 DIAGNOSIS — E11.65 TYPE 2 DIABETES MELLITUS WITH HYPERGLYCEMIA, WITHOUT LONG-TERM CURRENT USE OF INSULIN (H): Primary | ICD-10-CM

## 2021-04-20 DIAGNOSIS — E03.8 OTHER SPECIFIED HYPOTHYROIDISM: ICD-10-CM

## 2021-04-20 NOTE — PROGRESS NOTES
Patient is has scheduled a lab appointment for her upcoming visit with you. Please place any orders as futur orders, thank you Lab staff

## 2021-05-17 ENCOUNTER — TELEPHONE (OUTPATIENT)
Dept: PEDIATRICS | Facility: CLINIC | Age: 76
End: 2021-05-17

## 2021-05-17 DIAGNOSIS — E11.9 TYPE 2 DIABETES MELLITUS WITHOUT COMPLICATION, WITHOUT LONG-TERM CURRENT USE OF INSULIN (H): ICD-10-CM

## 2021-05-17 RX ORDER — METFORMIN HCL 500 MG
500 TABLET, EXTENDED RELEASE 24 HR ORAL DAILY
Qty: 180 TABLET | Refills: 1 | Status: SHIPPED | OUTPATIENT
Start: 2021-05-17 | End: 2021-11-19

## 2021-10-29 ENCOUNTER — TELEPHONE (OUTPATIENT)
Dept: ENDOCRINOLOGY | Facility: CLINIC | Age: 76
End: 2021-10-29
Payer: COMMERCIAL

## 2021-10-29 ENCOUNTER — TELEPHONE (OUTPATIENT)
Dept: PEDIATRICS | Facility: CLINIC | Age: 76
End: 2021-10-29

## 2021-10-29 DIAGNOSIS — I25.10 ASCVD (ARTERIOSCLEROTIC CARDIOVASCULAR DISEASE): Primary | ICD-10-CM

## 2021-10-29 DIAGNOSIS — E11.9 TYPE 2 DIABETES MELLITUS WITHOUT COMPLICATION, WITHOUT LONG-TERM CURRENT USE OF INSULIN (H): ICD-10-CM

## 2021-10-29 NOTE — TELEPHONE ENCOUNTER
Patient called for a couple things. First she stated that she has an upcoming lab appointment on 11/02/21 for diabetes and thyroid labs but wants her cholesterol/lipid checked along with other labs that PCP may find necessary.    Lab order pended.    Second, she is having a tooth extraction on11/09/21 and wants to know if she should make any changes to her medications.     Patient would like to be contacted as soon as possible, before her lab appointment.     Melanie Alvarenga MA 3:13 PM 10/29/2021

## 2021-10-29 NOTE — TELEPHONE ENCOUNTER
Call placed to pt with message from provider    Pt verbalized understanding and agrees to the plan    Thank you  Leticia Briones RN on 10/29/2021 at 4:14 PM   moderate assist (50% patients effort)

## 2021-10-29 NOTE — TELEPHONE ENCOUNTER
Lab orders have been sent.     For a tooth extraction, I do not think any changes to medications need to be made.     Zaina Ramirez MD   Internal Medicine - Pediatrics

## 2021-10-29 NOTE — TELEPHONE ENCOUNTER
[facility] Call Center    Phone Message    May a detailed message be left on voicemail: yes     Reason for Call: Form or Letter   Type or form/letter needing completion: Lab results request  Provider: Mirtha  Date form needed: 11/5/21  Once completed: Patient will  at .     Pt is requesting lab results be printed and ready for  at [place] location.  Pt is having labs completed on 11/2/21 and she is having oral surgery on 11/9/21.  Pt stated she needs lab results available for oral surgeon.  Please call if there will be any issue.        Action Taken: Message routed to:  Other: endo    Travel Screening: Not Applicable

## 2021-11-02 ENCOUNTER — LAB (OUTPATIENT)
Dept: LAB | Facility: CLINIC | Age: 76
End: 2021-11-02
Payer: COMMERCIAL

## 2021-11-02 DIAGNOSIS — E11.65 TYPE 2 DIABETES MELLITUS WITH HYPERGLYCEMIA, WITHOUT LONG-TERM CURRENT USE OF INSULIN (H): ICD-10-CM

## 2021-11-02 DIAGNOSIS — I25.10 ASCVD (ARTERIOSCLEROTIC CARDIOVASCULAR DISEASE): ICD-10-CM

## 2021-11-02 DIAGNOSIS — E21.3 HYPERPARATHYROIDISM (H): ICD-10-CM

## 2021-11-02 DIAGNOSIS — E11.9 TYPE 2 DIABETES MELLITUS WITHOUT COMPLICATION, WITHOUT LONG-TERM CURRENT USE OF INSULIN (H): ICD-10-CM

## 2021-11-02 DIAGNOSIS — E03.8 OTHER SPECIFIED HYPOTHYROIDISM: ICD-10-CM

## 2021-11-02 LAB
HBA1C MFR BLD: 8.7 % (ref 0–5.6)
PTH-INTACT SERPL-MCNC: 89 PG/ML (ref 18–80)

## 2021-11-02 PROCEDURE — 83036 HEMOGLOBIN GLYCOSYLATED A1C: CPT

## 2021-11-02 PROCEDURE — 82306 VITAMIN D 25 HYDROXY: CPT

## 2021-11-02 PROCEDURE — 83970 ASSAY OF PARATHORMONE: CPT

## 2021-11-02 PROCEDURE — 84443 ASSAY THYROID STIM HORMONE: CPT

## 2021-11-02 PROCEDURE — 83735 ASSAY OF MAGNESIUM: CPT

## 2021-11-02 PROCEDURE — 82043 UR ALBUMIN QUANTITATIVE: CPT

## 2021-11-02 PROCEDURE — 36415 COLL VENOUS BLD VENIPUNCTURE: CPT

## 2021-11-02 PROCEDURE — 84100 ASSAY OF PHOSPHORUS: CPT

## 2021-11-02 PROCEDURE — 80053 COMPREHEN METABOLIC PANEL: CPT

## 2021-11-02 PROCEDURE — 80061 LIPID PANEL: CPT

## 2021-11-02 NOTE — LETTER
[facility] [place]  [address], [address]  [place] [address]  [phone]        November 15, 2021    Margaret Ramos  [address] [place] [address]            Dear Ms. Margaret Ramos:    Labs results/imaging studies results noted. Will discuss in next clinic visit 22.   Lab Results        Component                Value               Date                        A1C                      8.7                 2021                  A1C                      8.7                 01/15/2021                  A1C                      8.1                 03/10/2020                    Other labs showing slightly high PTH.     Here is a copy for your records.   Follow-up in endocrinology Clinic as scheduled/discussed. We will review these studies/results in further detail at that visit, but feel free to contact us with any other questions in the interim.     Please call endocrinology clinic (nurse line: [phone]) if questions.       Sincerely,      Dr. Rosario Shannon  [facility] Endocrinology [phone]    Results for orders placed or performed in visit on 21   Phosphorus     Status: Normal   Result Value Ref Range    Phosphorus 3.1 2.5 - 4.5 mg/dL   Magnesium     Status: Normal   Result Value Ref Range    Magnesium 2.2 1.6 - 2.3 mg/dL   Parathyroid Hormone Intact     Status: Abnormal   Result Value Ref Range    Parathyroid Hormone Intact 89 (H) 18 - 80 pg/mL   Vitamin D Deficiency     Status: Normal   Result Value Ref Range    Vitamin D, Total (25-Hydroxy) 41 20 - 75 ug/L    Narrative    Season, race, dietary intake, and treatment affect the concentration of 25-hydroxy-Vitamin D. Values may decrease during winter months and increase during summer months. Values 20-29 ug/L may indicate Vitamin D insufficiency and values <20 ug/L may indicate Vitamin D deficiency.    Vitamin D determination is routinely performed by an immunoassay specific for  25 hydroxyvitamin D3.  If an individual is on vitamin D2(ergocalciferol) supplementation, please specify 25 OH vitamin D2 and D3 level determination by LCMSMS test VITD23.     TSH with free T4 reflex     Status: Normal   Result Value Ref Range    TSH 1.59 0.40 - 4.00 mU/L   Hemoglobin A1c     Status: Abnormal   Result Value Ref Range    Hemoglobin A1C 8.7 (H) 0.0 - 5.6 %    Narrative    Result confirmed by repeat test.     Lipid panel reflex to direct LDL Fasting     Status: Abnormal   Result Value Ref Range    Cholesterol 325 (H) <200 mg/dL    Triglycerides 138 <150 mg/dL    Direct Measure HDL 64 >=50 mg/dL    LDL Cholesterol Calculated 233 (H) <=100 mg/dL    Non HDL Cholesterol 261 (H) <130 mg/dL    Patient Fasting > 8hrs? Yes     Narrative    Cholesterol  Desirable:  <200 mg/dL    Triglycerides  Normal:  Less than 150 mg/dL  Borderline High:  150-199 mg/dL  High:  200-499 mg/dL  Very High:  Greater than or equal to 500 mg/dL    Direct Measure HDL  Female:  Greater than or equal to 50 mg/dL   Male:  Greater than or equal to 40 mg/dL    LDL Cholesterol  Desirable:  <100mg/dL  Above Desirable:  100-129 mg/dL   Borderline High:  130-159 mg/dL   High:  160-189 mg/dL   Very High:  >= 190 mg/dL    Non HDL Cholesterol  Desirable:  130 mg/dL  Above Desirable:  130-159 mg/dL  Borderline High:  160-189 mg/dL  High:  190-219 mg/dL  Very High:  Greater than or equal to 220 mg/dL   Comprehensive metabolic panel (BMP + Alb, Alk Phos, ALT, AST, Total. Bili, TP)     Status: Abnormal   Result Value Ref Range    Sodium 136 133 - 144 mmol/L    Potassium 3.9 3.4 - 5.3 mmol/L    Chloride 105 94 - 109 mmol/L    Carbon Dioxide (CO2) 28 20 - 32 mmol/L    Anion Gap 3 3 - 14 mmol/L    Urea Nitrogen 18 7 - 30 mg/dL    Creatinine 0.75 0.52 - 1.04 mg/dL    Calcium 10.0 8.5 - 10.1 mg/dL    Glucose 196 (H) 70 - 99 mg/dL    Alkaline Phosphatase 146 40 - 150 U/L    AST 19 0 - 45 U/L    ALT 32 0 - 50 U/L    Protein Total 7.5 6.8 - 8.8 g/dL     Albumin 3.5 3.4 - 5.0 g/dL    Bilirubin Total 0.5 0.2 - 1.3 mg/dL    GFR Estimate 78 >60 mL/min/1.73m2   Albumin Random Urine Quantitative with Creat Ratio     Status: None   Result Value Ref Range    Creatinine Urine mg/dL 51 mg/dL    Albumin Urine mg/L 10 mg/L    Albumin Urine mg/g Cr 19.61 0.00 - 25.00 mg/g Cr

## 2021-11-03 LAB
ALBUMIN SERPL-MCNC: 3.5 G/DL (ref 3.4–5)
ALP SERPL-CCNC: 146 U/L (ref 40–150)
ALT SERPL W P-5'-P-CCNC: 32 U/L (ref 0–50)
ANION GAP SERPL CALCULATED.3IONS-SCNC: 3 MMOL/L (ref 3–14)
AST SERPL W P-5'-P-CCNC: 19 U/L (ref 0–45)
BILIRUB SERPL-MCNC: 0.5 MG/DL (ref 0.2–1.3)
BUN SERPL-MCNC: 18 MG/DL (ref 7–30)
CALCIUM SERPL-MCNC: 10 MG/DL (ref 8.5–10.1)
CHLORIDE BLD-SCNC: 105 MMOL/L (ref 94–109)
CHOLEST SERPL-MCNC: 325 MG/DL
CO2 SERPL-SCNC: 28 MMOL/L (ref 20–32)
CREAT SERPL-MCNC: 0.75 MG/DL (ref 0.52–1.04)
CREAT UR-MCNC: 51 MG/DL
DEPRECATED CALCIDIOL+CALCIFEROL SERPL-MC: 41 UG/L (ref 20–75)
FASTING STATUS PATIENT QL REPORTED: YES
GFR SERPL CREATININE-BSD FRML MDRD: 78 ML/MIN/1.73M2
GLUCOSE BLD-MCNC: 196 MG/DL (ref 70–99)
HDLC SERPL-MCNC: 64 MG/DL
LDLC SERPL CALC-MCNC: 233 MG/DL
MAGNESIUM SERPL-MCNC: 2.2 MG/DL (ref 1.6–2.3)
MICROALBUMIN UR-MCNC: 10 MG/L
MICROALBUMIN/CREAT UR: 19.61 MG/G CR (ref 0–25)
NONHDLC SERPL-MCNC: 261 MG/DL
PHOSPHATE SERPL-MCNC: 3.1 MG/DL (ref 2.5–4.5)
POTASSIUM BLD-SCNC: 3.9 MMOL/L (ref 3.4–5.3)
PROT SERPL-MCNC: 7.5 G/DL (ref 6.8–8.8)
SODIUM SERPL-SCNC: 136 MMOL/L (ref 133–144)
TRIGL SERPL-MCNC: 138 MG/DL
TSH SERPL DL<=0.005 MIU/L-ACNC: 1.59 MU/L (ref 0.4–4)

## 2021-11-14 NOTE — RESULT ENCOUNTER NOTE
Labs results/imaging studies results noted. Will discuss in next clinic visit 1/17/22.  Lab Results       Component                Value               Date                       A1C                      8.7                 11/02/2021                 A1C                      8.7                 01/15/2021                 A1C                      8.1                 03/10/2020              Other labs showing slightly high PTH.    Here is a copy for your records.  Follow-up in endocrinology Clinic as scheduled/discussed. We will review these studies/results in further detail at that visit, but feel free to contact us with any other questions in the interim.    Please call endocrinology clinic (nurse line: [phone]) if questions.    Rosario Shannon MD    Please mail a copy to pt.

## 2021-11-18 ENCOUNTER — TRANSFERRED RECORDS (OUTPATIENT)
Dept: HEALTH INFORMATION MANAGEMENT | Facility: CLINIC | Age: 76
End: 2021-11-18
Payer: COMMERCIAL

## 2021-11-18 LAB — RETINOPATHY: NEGATIVE

## 2021-11-19 ENCOUNTER — VIRTUAL VISIT (OUTPATIENT)
Dept: PEDIATRICS | Facility: CLINIC | Age: 76
End: 2021-11-19
Payer: COMMERCIAL

## 2021-11-19 DIAGNOSIS — E11.65 TYPE 2 DIABETES MELLITUS WITH HYPERGLYCEMIA, WITHOUT LONG-TERM CURRENT USE OF INSULIN (H): Primary | ICD-10-CM

## 2021-11-19 DIAGNOSIS — I10 ESSENTIAL HYPERTENSION: ICD-10-CM

## 2021-11-19 DIAGNOSIS — Z95.5 S/P DRUG ELUTING CORONARY STENT PLACEMENT: ICD-10-CM

## 2021-11-19 DIAGNOSIS — E78.2 MIXED HYPERLIPIDEMIA: ICD-10-CM

## 2021-11-19 PROCEDURE — 99215 OFFICE O/P EST HI 40 MIN: CPT | Mod: TEL | Performed by: INTERNAL MEDICINE

## 2021-11-19 RX ORDER — PRAVASTATIN SODIUM 20 MG
20 TABLET ORAL DAILY
Qty: 90 TABLET | Refills: 3 | Status: SHIPPED | OUTPATIENT
Start: 2021-11-19 | End: 2022-06-30

## 2021-11-19 RX ORDER — METFORMIN HCL 500 MG
1000 TABLET, EXTENDED RELEASE 24 HR ORAL 2 TIMES DAILY WITH MEALS
Qty: 180 TABLET | Refills: 1 | Status: SHIPPED | OUTPATIENT
Start: 2021-11-19 | End: 2022-06-15

## 2021-11-19 NOTE — PATIENT INSTRUCTIONS
Diabetes: Stop regular metformin. Start taking 2 tabs (1000 mg) metformin XR twice a day.  (Start 1 tab twice a day, then after a few days take 2 tabs with breakfast and 1 in the evening.  Then after a few more days, increase to 2 tabs twice a day.) You can skip glyburide while you are making these changes.     Cholesterol: we will start pravastatin which has lower risk of muscle aches.     I strongly recommend that you

## 2021-11-19 NOTE — PROGRESS NOTES
Margaret is a 76 year old who is being evaluated via a billable telephone visit.      What phone number would you like to be contacted at? [phone]  How would you like to obtain your AVS? MyChart    Assessment & Plan     Type 2 diabetes mellitus with hyperglycemia, without long-term current use of insulin (H)  We discussed at length the importance of Margaret taking her medications as prescribed.  Because she is so worried about hypoglycemia, and to simplify her medication regimen I have stopped regular Metformin and increased Metformin XR to 1000 mg twice daily.  I outlined a taper that she can use to increase this gradually due to her significant trepidation about medication changes.  I also indicated that while she makes this change she can hold glyburide due to her concern about hypoglycemia.  Once she is stable on her increased dose of Metformin XR, will reinitiate glyburide if needed.  - metFORMIN (GLUCOPHAGE-XR) 500 MG 24 hr tablet; Take 2 tablets (1,000 mg) by mouth 2 times daily (with meals) In addition to metformin regular 500 mg in morning, afternoon and at bedtime.    Mixed hyperlipidemia (DYSLIPIDEMIA)  Discussed the importance of statin medication due to her history of coronary artery disease and uncontrolled diabetes.  Will try pravastatin, which is less likely to have side effects.  - pravastatin (PRAVACHOL) 20 MG tablet; Take 1 tablet (20 mg) by mouth daily    S/P drug eluting coronary stent placement  Discussed at length the importance of better control of her diabetes, cholesterol management, stress management and adequate sleep.    Essential hypertension  She does not check her blood pressure at home.  No recent readings.  We will address this at her next visit.        63 minutes spent on the date of the encounter doing chart review, history and exam, documentation and further activities per the note       BMI:   Estimated body mass index is 34.45 kg/m  as calculated from the following:    Height  "as of 2/18/21: 1.562 m (5' 1.5\").    Weight as of 2/18/21: 84.1 kg (185 lb 4.8 oz).           Return in about 1 month (around 12/19/2021) for Follow up, using a phone visit.    Zaina Ramirez MD  [facility] VAISHALI Robles is a 76 year old who presents for the following health issues     HPI     Margaret had some labs earlier this month, and has had dental surgery that she still has pain from.     She didn't get the lab results because she stopped her mail at the time of her dental surgery.    1. Hemoglobin A1c is 8.7. Margaret states that she lives alone at home, and does not have backup to help her.  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  She does not take her medication regularly - does not set up her pill boxes anymore like she used to. She does not prioritize her health. She is certain she has hypoglycemia symptoms when her blood sugar is near 100, so she tries to keep it between 120-150.  BG at last labs was 197. She takes regular metformin, metform XR, and glyburide.     2. She is not getting good sleep, only sleeps 4-7 hours per day due to taking care of her sister.     3. Urine incontinence - urge incontinence. Not willing to do PT at this time.     4. Osteoporosis - interested in more education. Has had the diagnosis for years. Takes calcium occasionally. Scared of taking biophosphonate medication.     5. Unvaccinated for covid-19. Very worried about side effects.     6.  Hyperlipidemia: She stopped taking her atorvastatin due to muscle aches in the upper arms.    Margaret has significant struggles to take care of herself, feels overwhelmed by her medical problems and not sure where to start.  A lot of this is due to taking care of her sister.  She realizes that her sister does need long-term care and would benefit from living in a facility, however she is worried about this due to the Covid " pandemic.  Neither she nor her sister are vaccinated against Covid.  She is extremely worried about side effects of the vaccine.              Review of Systems   Constitutional, HEENT, cardiovascular, pulmonary, gi and gu systems are negative, except as otherwise noted.      Objective           Vitals:  No vitals were obtained today due to virtual visit.    Physical Exam   GEN: healthy, alert and tired  PSYCH: Alert and oriented times 3; coherent speech, normal   rate and volume, able to articulate logical thoughts, able   to abstract reason, no tangential thoughts, no hallucinations   or delusions  Her affect is sad  RESP: No cough, no audible wheezing, able to talk in full sentences  Remainder of exam unable to be completed due to telephone visits                Phone call duration: 45 minutes

## 2021-12-09 DIAGNOSIS — E11.65 TYPE 2 DIABETES MELLITUS WITH HYPERGLYCEMIA, WITHOUT LONG-TERM CURRENT USE OF INSULIN (H): Primary | ICD-10-CM

## 2021-12-09 NOTE — TELEPHONE ENCOUNTER
Received call from pt she needs a new glucometer     Thank you  Leticia Briones RN on 12/9/2021 at 4:36 PM

## 2021-12-10 NOTE — TELEPHONE ENCOUNTER
Routing refill request to provider for review/approval because:  Drug not on the [facility] refill protocol   Drug not active on patient's medication list    Jyoti Hutton RN on 12/10/2021 at 7:19 AM

## 2022-01-24 DIAGNOSIS — E78.2 MIXED HYPERLIPIDEMIA: ICD-10-CM

## 2022-01-24 DIAGNOSIS — E03.9 HYPOTHYROIDISM, UNSPECIFIED TYPE: ICD-10-CM

## 2022-01-24 DIAGNOSIS — I25.10 ASCVD (ARTERIOSCLEROTIC CARDIOVASCULAR DISEASE): ICD-10-CM

## 2022-01-24 DIAGNOSIS — E11.65 TYPE 2 DIABETES MELLITUS WITH HYPERGLYCEMIA, WITHOUT LONG-TERM CURRENT USE OF INSULIN (H): ICD-10-CM

## 2022-01-25 RX ORDER — ATORVASTATIN CALCIUM 40 MG/1
40 TABLET, FILM COATED ORAL DAILY
Qty: 90 TABLET | Refills: 2 | Status: SHIPPED | OUTPATIENT
Start: 2022-01-25 | End: 2022-06-30

## 2022-01-25 RX ORDER — LEVOTHYROXINE SODIUM 112 UG/1
TABLET ORAL
Qty: 90 TABLET | Refills: 2 | Status: SHIPPED | OUTPATIENT
Start: 2022-01-25 | End: 2022-06-30

## 2022-02-04 DIAGNOSIS — E11.9 TYPE 2 DIABETES MELLITUS WITHOUT COMPLICATION, WITHOUT LONG-TERM CURRENT USE OF INSULIN (H): ICD-10-CM

## 2022-02-04 RX ORDER — BLOOD SUGAR DIAGNOSTIC
STRIP MISCELLANEOUS
Qty: 400 STRIP | Refills: 1 | Status: SHIPPED | OUTPATIENT
Start: 2022-02-04 | End: 2022-12-09

## 2022-03-06 DIAGNOSIS — E11.65 TYPE 2 DIABETES MELLITUS WITH HYPERGLYCEMIA, WITHOUT LONG-TERM CURRENT USE OF INSULIN (H): ICD-10-CM

## 2022-03-07 RX ORDER — LANCING DEVICE/LANCETS
KIT MISCELLANEOUS
Qty: 1 EACH | Refills: 0 | Status: SHIPPED | OUTPATIENT
Start: 2022-03-07

## 2022-03-07 RX ORDER — GLUCOSAMINE HCL/CHONDROITIN SU 500-400 MG
CAPSULE ORAL
Qty: 100 EACH | Refills: 6 | Status: SHIPPED | OUTPATIENT
Start: 2022-03-07

## 2022-03-07 RX ORDER — LANCETS
EACH MISCELLANEOUS
Qty: 11 EACH | Refills: 6 | Status: SHIPPED | OUTPATIENT
Start: 2022-03-07

## 2022-03-07 NOTE — TELEPHONE ENCOUNTER
Orders for generic and Softclix lancet supplies sent over.    Millicent Burgos MD  Internal Medicine & Pediatrics  [facility]

## 2022-06-15 ENCOUNTER — TELEPHONE (OUTPATIENT)
Dept: PEDIATRICS | Facility: CLINIC | Age: 77
End: 2022-06-15
Payer: COMMERCIAL

## 2022-06-15 DIAGNOSIS — E11.9 TYPE 2 DIABETES MELLITUS WITHOUT COMPLICATION, WITHOUT LONG-TERM CURRENT USE OF INSULIN (H): ICD-10-CM

## 2022-06-15 DIAGNOSIS — E21.3 HYPERPARATHYROIDISM (H): Primary | ICD-10-CM

## 2022-06-15 DIAGNOSIS — E11.65 TYPE 2 DIABETES MELLITUS WITH HYPERGLYCEMIA, WITHOUT LONG-TERM CURRENT USE OF INSULIN (H): ICD-10-CM

## 2022-06-15 NOTE — TELEPHONE ENCOUNTER
Spoke with pt and scheduled. Pt has a lab scheduled for 6/17/22. Patient would like fasting labs and all labs Dr. Shannon normally orders:    A1C, TSH, Vit D, Parathyroid, Magnesium, Phosphorus, CMP.     Pt also states she takes both Extended Release and Regular Metformin 500 mg, which she is out of and would like a refill.     Per order for XR release: Medication Administration Instructions    In addition to metformin regular 500 mg in morning, afternoon and at bedtime.     Pt states she steadfastly takes 1 XR release and one regular 500 mg of Metformin at lunch (which is pt's breakfast). Patient takes 1 regular in the pm. She sometimes takes the XR if her blood sugar is high or if it is not too late.     Pt states Dr. Nobles's nurse told patient that since it has been so long since patient has seen Dr. Nobles, that she would have to start over as a new patient. Patient also stated she was having difficulty understanding Dr. Nobles sometimes and would like to switch endocrinologists.     Sofia Chakraborty on 6/15/2022 at 4:40 PM

## 2022-06-16 RX ORDER — METFORMIN HCL 500 MG
1000 TABLET, EXTENDED RELEASE 24 HR ORAL 2 TIMES DAILY WITH MEALS
Qty: 180 TABLET | Refills: 1 | Status: SHIPPED | OUTPATIENT
Start: 2022-06-16 | End: 2022-06-17

## 2022-06-16 NOTE — TELEPHONE ENCOUNTER
We are due for a visit to review diabetes medications. Prefer in person if possible.     At our last visit, we switched from regular metformin to metfomin XR.  I recommend transitioning to metformin XR, 2 tabs twice a day.  We can set up a virtual visit to talk through this plan if needed.     Metformin XR prescription renewed.   Zaina Ramirez MD   Internal Medicine - Pediatrics

## 2022-06-17 ENCOUNTER — LAB (OUTPATIENT)
Dept: LAB | Facility: CLINIC | Age: 77
End: 2022-06-17

## 2022-06-17 DIAGNOSIS — E11.65 TYPE 2 DIABETES MELLITUS WITH HYPERGLYCEMIA, WITHOUT LONG-TERM CURRENT USE OF INSULIN (H): ICD-10-CM

## 2022-06-17 DIAGNOSIS — E21.3 HYPERPARATHYROIDISM (H): ICD-10-CM

## 2022-06-17 LAB
HBA1C MFR BLD: 8.7 % (ref 0–5.6)
PTH-INTACT SERPL-MCNC: 96 PG/ML (ref 18–80)

## 2022-06-17 PROCEDURE — 36415 COLL VENOUS BLD VENIPUNCTURE: CPT | Performed by: INTERNAL MEDICINE

## 2022-06-17 PROCEDURE — 84443 ASSAY THYROID STIM HORMONE: CPT

## 2022-06-17 PROCEDURE — 80061 LIPID PANEL: CPT

## 2022-06-17 PROCEDURE — 80053 COMPREHEN METABOLIC PANEL: CPT

## 2022-06-17 PROCEDURE — 83970 ASSAY OF PARATHORMONE: CPT

## 2022-06-17 PROCEDURE — 83036 HEMOGLOBIN GLYCOSYLATED A1C: CPT | Performed by: INTERNAL MEDICINE

## 2022-06-17 PROCEDURE — 84100 ASSAY OF PHOSPHORUS: CPT

## 2022-06-17 PROCEDURE — 83735 ASSAY OF MAGNESIUM: CPT

## 2022-06-17 PROCEDURE — 82306 VITAMIN D 25 HYDROXY: CPT

## 2022-06-17 RX ORDER — METFORMIN HCL 500 MG
1000 TABLET, EXTENDED RELEASE 24 HR ORAL 2 TIMES DAILY WITH MEALS
Qty: 180 TABLET | Refills: 1 | Status: SHIPPED | OUTPATIENT
Start: 2022-06-17 | End: 2022-06-30

## 2022-06-17 NOTE — TELEPHONE ENCOUNTER
Dr.Emily Ramirez - the rx that you sent yesterday still says that pt is taking both regular & extended release. Is that correct? Please advise.     Pt has an appointment with Dr.Emily Ramirez for px on 6/30/22.    Humera, RN  Patient Advocate [facility] (PAL)  [facility]

## 2022-06-17 NOTE — TELEPHONE ENCOUNTER
Called pt at [phone]. Pt states that she takes care of her sister who needs continuous attention. So, she doesn't eat on-time(eats breakfast closer to noon, dinner at the wee hours). She takes her meds after food intake, so she doesn't take meds at the regular interval. So, she has tried metformin(regular & XR) dosing in many different combinations & nothing seems to work. She is worried about hypoglycemia while taking metformin XR w/o eating regularly. She is completely out of regular metformin & requesting refill till her upcoming visit.    After huddling with Dr.Emily Ramirez, sent metformin 500 mg tablets 30 tabs. Also, notified Dr.Emily Ramirez that pt will benefit from CDE. Dr.Emily Ramirez agrees & will offer that to pt during upcoming visit. Pended CDE referral.     Notified pt.     Humera RN  Patient Advocate [facility]ason (PAL)  [facility]

## 2022-06-17 NOTE — TELEPHONE ENCOUNTER
This was an error carried forward from previous Rx.  She should be now on Metformin XR 1000 mg BID, and no regular metformin. We will discuss this and possibly adding back glyburide at our next visit.     Zaina Ramirez MD   Internal Medicine - Pediatrics

## 2022-06-18 LAB
ALBUMIN SERPL-MCNC: 3.6 G/DL (ref 3.4–5)
ALP SERPL-CCNC: 128 U/L (ref 40–150)
ALT SERPL W P-5'-P-CCNC: 32 U/L (ref 0–50)
ANION GAP SERPL CALCULATED.3IONS-SCNC: 4 MMOL/L (ref 3–14)
AST SERPL W P-5'-P-CCNC: 24 U/L (ref 0–45)
BILIRUB SERPL-MCNC: 0.4 MG/DL (ref 0.2–1.3)
BUN SERPL-MCNC: 17 MG/DL (ref 7–30)
CALCIUM SERPL-MCNC: 10.1 MG/DL (ref 8.5–10.1)
CHLORIDE BLD-SCNC: 106 MMOL/L (ref 94–109)
CHOLEST SERPL-MCNC: 291 MG/DL
CO2 SERPL-SCNC: 27 MMOL/L (ref 20–32)
CREAT SERPL-MCNC: 0.68 MG/DL (ref 0.52–1.04)
DEPRECATED CALCIDIOL+CALCIFEROL SERPL-MC: 46 UG/L (ref 20–75)
FASTING STATUS PATIENT QL REPORTED: ABNORMAL
GFR SERPL CREATININE-BSD FRML MDRD: 89 ML/MIN/1.73M2
GLUCOSE BLD-MCNC: 189 MG/DL (ref 70–99)
HDLC SERPL-MCNC: 64 MG/DL
LDLC SERPL CALC-MCNC: 200 MG/DL
MAGNESIUM SERPL-MCNC: 2.3 MG/DL (ref 1.6–2.3)
NONHDLC SERPL-MCNC: 227 MG/DL
PHOSPHATE SERPL-MCNC: 3.1 MG/DL (ref 2.5–4.5)
POTASSIUM BLD-SCNC: 4 MMOL/L (ref 3.4–5.3)
PROT SERPL-MCNC: 7 G/DL (ref 6.8–8.8)
SODIUM SERPL-SCNC: 137 MMOL/L (ref 133–144)
TRIGL SERPL-MCNC: 133 MG/DL
TSH SERPL DL<=0.005 MIU/L-ACNC: 0.8 MU/L (ref 0.4–4)

## 2022-06-23 DIAGNOSIS — E11.65 TYPE 2 DIABETES MELLITUS WITH HYPERGLYCEMIA, WITHOUT LONG-TERM CURRENT USE OF INSULIN (H): Primary | ICD-10-CM

## 2022-06-24 ENCOUNTER — TELEPHONE (OUTPATIENT)
Dept: PEDIATRICS | Facility: CLINIC | Age: 77
End: 2022-06-24

## 2022-06-24 NOTE — TELEPHONE ENCOUNTER
Diabetes Education Scheduling Outreach #1:    Call to patient to schedule. Left message with phone number to call to schedule.    Plan for 2nd outreach attempt within 1 week.    Sofia Aguero  [facility]  Diabetes and Nutrition Scheduling

## 2022-06-30 ENCOUNTER — OFFICE VISIT (OUTPATIENT)
Dept: PEDIATRICS | Facility: CLINIC | Age: 77
End: 2022-06-30
Payer: COMMERCIAL

## 2022-06-30 VITALS
BODY MASS INDEX: 32.2 KG/M2 | TEMPERATURE: 97.1 F | WEIGHT: 175 LBS | RESPIRATION RATE: 16 BRPM | HEART RATE: 80 BPM | OXYGEN SATURATION: 99 % | DIASTOLIC BLOOD PRESSURE: 66 MMHG | SYSTOLIC BLOOD PRESSURE: 150 MMHG | HEIGHT: 62 IN

## 2022-06-30 DIAGNOSIS — I25.10 ASCVD (ARTERIOSCLEROTIC CARDIOVASCULAR DISEASE): ICD-10-CM

## 2022-06-30 DIAGNOSIS — E11.65 TYPE 2 DIABETES MELLITUS WITH HYPERGLYCEMIA, WITHOUT LONG-TERM CURRENT USE OF INSULIN (H): ICD-10-CM

## 2022-06-30 DIAGNOSIS — E21.3 HYPERPARATHYROIDISM (H): ICD-10-CM

## 2022-06-30 DIAGNOSIS — E78.2 MIXED HYPERLIPIDEMIA: ICD-10-CM

## 2022-06-30 DIAGNOSIS — Z00.00 ROUTINE GENERAL MEDICAL EXAMINATION AT A HEALTH CARE FACILITY: Primary | ICD-10-CM

## 2022-06-30 DIAGNOSIS — E03.9 HYPOTHYROIDISM, UNSPECIFIED TYPE: ICD-10-CM

## 2022-06-30 DIAGNOSIS — E11.9 TYPE 2 DIABETES MELLITUS WITHOUT COMPLICATION, WITHOUT LONG-TERM CURRENT USE OF INSULIN (H): ICD-10-CM

## 2022-06-30 PROCEDURE — 99214 OFFICE O/P EST MOD 30 MIN: CPT | Mod: 25 | Performed by: INTERNAL MEDICINE

## 2022-06-30 PROCEDURE — 99397 PER PM REEVAL EST PAT 65+ YR: CPT | Performed by: INTERNAL MEDICINE

## 2022-06-30 RX ORDER — METFORMIN HCL 500 MG
500 TABLET, EXTENDED RELEASE 24 HR ORAL
Qty: 90 TABLET | Refills: 3 | Status: SHIPPED | OUTPATIENT
Start: 2022-06-30 | End: 2022-12-09

## 2022-06-30 RX ORDER — BLOOD PRESSURE TEST KIT
KIT MISCELLANEOUS
Qty: 400 EACH | Refills: 3 | Status: SHIPPED | OUTPATIENT
Start: 2022-06-30

## 2022-06-30 RX ORDER — ATORVASTATIN CALCIUM 40 MG/1
40 TABLET, FILM COATED ORAL DAILY
Qty: 90 TABLET | Refills: 2 | Status: SHIPPED | OUTPATIENT
Start: 2022-06-30 | End: 2022-06-30

## 2022-06-30 RX ORDER — BLOOD SUGAR DIAGNOSTIC
STRIP MISCELLANEOUS
Qty: 400 STRIP | Refills: 4 | Status: SHIPPED | OUTPATIENT
Start: 2022-06-30

## 2022-06-30 RX ORDER — PRAVASTATIN SODIUM 20 MG
20 TABLET ORAL DAILY
Qty: 90 TABLET | Refills: 3 | Status: SHIPPED | OUTPATIENT
Start: 2022-06-30 | End: 2024-01-31

## 2022-06-30 RX ORDER — LEVOTHYROXINE SODIUM 112 UG/1
TABLET ORAL
Qty: 90 TABLET | Refills: 2 | Status: SHIPPED | OUTPATIENT
Start: 2022-06-30 | End: 2023-09-06

## 2022-06-30 RX ORDER — LANCETS
EACH MISCELLANEOUS
Qty: 400 EACH | Refills: 4 | Status: SHIPPED | OUTPATIENT
Start: 2022-06-30 | End: 2023-08-22

## 2022-06-30 ASSESSMENT — ASTHMA QUESTIONNAIRES
ACT_TOTALSCORE: 16
QUESTION_1 LAST FOUR WEEKS HOW MUCH OF THE TIME DID YOUR ASTHMA KEEP YOU FROM GETTING AS MUCH DONE AT WORK, SCHOOL OR AT HOME: NONE OF THE TIME
ACT_TOTALSCORE: 16
QUESTION_3 LAST FOUR WEEKS HOW OFTEN DID YOUR ASTHMA SYMPTOMS (WHEEZING, COUGHING, SHORTNESS OF BREATH, CHEST TIGHTNESS OR PAIN) WAKE YOU UP AT NIGHT OR EARLIER THAN USUAL IN THE MORNING: TWO OR THREE NIGHTS A WEEK
QUESTION_2 LAST FOUR WEEKS HOW OFTEN HAVE YOU HAD SHORTNESS OF BREATH: ONCE A DAY
QUESTION_4 LAST FOUR WEEKS HOW OFTEN HAVE YOU USED YOUR RESCUE INHALER OR NEBULIZER MEDICATION (SUCH AS ALBUTEROL): ONCE A WEEK OR LESS
QUESTION_5 LAST FOUR WEEKS HOW WOULD YOU RATE YOUR ASTHMA CONTROL: SOMEWHAT CONTROLLED

## 2022-06-30 ASSESSMENT — ENCOUNTER SYMPTOMS: BREAST MASS: 0

## 2022-06-30 ASSESSMENT — PAIN SCALES - GENERAL: PAINLEVEL: NO PAIN (0)

## 2022-06-30 ASSESSMENT — ACTIVITIES OF DAILY LIVING (ADL): CURRENT_FUNCTION: NO ASSISTANCE NEEDED

## 2022-06-30 NOTE — PATIENT INSTRUCTIONS
I recommend a low dose losartan (ARB - angiotensin receptor blocker) to protect your kidneys.       Preventive Health Recommendations    See your health care provider every year to    Review health changes.     Discuss preventive care.      Review your medicines if your doctor has prescribed any.      You no longer need a yearly Pap test unless you've had an abnormal Pap test in the past 10 years. If you have vaginal symptoms, such as bleeding or discharge, be sure to talk with your provider about a Pap test.      Every 1 to 2 years, have a mammogram.  If you are over 69, talk with your health care provider about whether or not you want to continue having screening mammograms.      Every 10 years, have a colonoscopy. Or, have a yearly FIT test (stool test). These exams will check for colon cancer.       Have a cholesterol test every 5 years, or more often if your doctor advises it.       Have a diabetes test (fasting glucose) every three years. If you are at risk for diabetes, you should have this test more often.       At age 65, have a bone density scan (DEXA) to check for osteoporosis (brittle bone disease).    Shots:    Get a flu shot each year.    Get a tetanus shot every 10 years.    Talk to your doctor about your pneumonia vaccines. There are now two you should receive - Pneumovax (PPSV 23) and Prevnar (PCV 13).    Talk to your pharmacist about the shingles vaccine.    Talk to your doctor about the hepatitis B vaccine.    Nutrition:     Eat at least 5 servings of fruits and vegetables each day.      Eat whole-grain bread, whole-wheat pasta and brown rice instead of white grains and rice.      Get adequate about Calcium and Vitamin D.     Lifestyle    Exercise at least 150 minutes a week (30 minutes a day, 5 days a week). This will help you control your weight and prevent disease.      Limit alcohol to one drink per day.      No smoking.       Wear sunscreen to prevent skin cancer.       See your dentist twice  a year for an exam and cleaning.      See your eye doctor every 1 to 2 years to screen for conditions such as glaucoma, macular degeneration, cataracts, etc.    Personalized Prevention Plan  You are due for the preventive services outlined below.  Your care team is available to assist you in scheduling these services.  If you have already completed any of these items, please share that information with your care team to update in your medical record.    Health Maintenance Due   Topic Date Due     ANNUAL REVIEW OF HM ORDERS  Never done     COVID-19 Vaccine (1) Never done     Zoster (Shingles) Vaccine (1 of 2) Never done     Pneumococcal Vaccine (2 - PCV) 12/27/2011     Asthma Action Plan - yearly  09/12/2019     Diabetic Foot Exam  09/13/2019     Patient Education   Personalized Prevention Plan  You are due for the preventive services outlined below.  Your care team is available to assist you in scheduling these services.  If you have already completed any of these items, please share that information with your care team to update in your medical record.  Health Maintenance Due   Topic Date Due     COVID-19 Vaccine (1) Never done     Zoster (Shingles) Vaccine (1 of 2) Never done     Pneumococcal Vaccine (2 - PCV) 12/27/2011     Asthma Action Plan - yearly  09/12/2019     Diabetic Foot Exam  09/13/2019       Exercise for a Healthier Heart  You may wonder how you can improve the health of your heart. If you re thinking about exercise, you re on the right track. You don t need to become an athlete. But you do need a certain amount of brisk exercise to help strengthen your heart. If you have been diagnosed with a heart condition, your healthcare provider may advise exercise to help stabilize your condition. To help make exercise a habit, choose safe, fun activities.      Exercise with a friend. When activity is fun, you're more likely to stick with it.   Before you start  Check with your healthcare provider before  starting an exercise program. This is especially important if you have not been active for a while. It's also important if you have a long-term (chronic) health problem such as heart disease, diabetes, or obesity. Or if you are at high risk for having these problems.   Why exercise?  Exercising regularly offers many healthy rewards. It can help you do all of the following:     Improve your blood cholesterol level to help prevent further heart trouble    Lower your blood pressure to help prevent a stroke or heart attack    Control diabetes, or reduce your risk of getting this disease    Improve your heart and lung function    Reach and stay at a healthy weight    Make your muscles stronger so you can stay active    Prevent falls and fractures by slowing the loss of bone mass (osteoporosis)    Manage stress better    Reduce your blood pressure    Improve your sense of self and your body image  Exercise tips      Ease into your routine. Set small goals. Then build on them. If you are not sure what your activity level should be, talk with your healthcare provider first before starting an exercise routine.    Exercise on most days. Aim for a total of 150 minutes (2 hours and 30 minutes) or more of moderate-intensity aerobic activity each week. Or 75 minutes (1 hour and 15 minutes) or more of vigorous-intensity aerobic activity each week. Or try for a combination of both. Moderate activity means that you breathe heavier and your heart rate increases but you can still talk. Think about doing 40 minutes of moderate exercise, 3 to 4 times a week. For best results, activity should last for about 40 minutes to lower blood pressure and cholesterol. It's OK to work up to the 40-minute period over time. Examples of moderate-intensity activity are walking 1 mile in 15 minutes. Or doing 30 to 45 minutes of yard work.    Step up your daily activity level.  Along with your exercise program, try being more active the whole day. Walk  instead of drive. Or park further away so that you take more steps each day. Do more household tasks or yard work. You may not be able to meet the advised mount of physical activity. But doing some moderate- or vigorous-intensity aerobic activity can help reduce your risk for heart disease. Your healthcare provider can help you figure out what is best for you.    Choose 1 or more activities you enjoy.  Walking is one of the easiest things you can do. You can also try swimming, riding a bike, dancing, or taking an exercise class.    When to call your healthcare provider  Call your healthcare provider if you have any of these:     Chest pain or feel dizzy or lightheaded    Burning, tightness, pressure, or heaviness in your chest, neck, shoulders, back, or arms    Abnormal shortness of breath    More joint or muscle pain    A very fast or irregular heartbeat (palpitations)  [employer] last reviewed this educational content on 7/1/2019 2000-2021 [employer]. All rights reserved. This information is not intended as a substitute for professional medical care. Always follow your healthcare professional's instructions.          Urinary Incontinence, Female (Adult)   Urinary incontinence means loss of bladder control. This problem affects many women, especially as they get older. If you have incontinence, you may be embarrassed to ask for help. But know that this problem can be treated.   Types of Incontinence  There are different types of incontinence. Two of the main types are described here. You can have more than one type.     Stress incontinence. With this type, urine leaks when pressure (stress) is put on the bladder. This may happen when you cough, sneeze, or laugh. Stress incontinence most often occurs because the pelvic floor muscles that support the bladder and urethra are weak. This can happen after pregnancy and vaginal childbirth or a hysterectomy. It can also be due to excess body weight or  hormone changes.    Urge incontinence (also called overactive bladder). With this type, a sudden urge to urinate is felt often. This may happen even though there may not be much urine in the bladder. The need to urinate often during the night is common. Urge incontinence most often occurs because of bladder spasms. This may be due to bladder irritation or infection. Damage to bladder nerves or pelvic muscles, constipation, and certain medicines can also lead to urge incontinence.  Treatment depends on the cause. Further evaluation is needed to find the type you have. This will likely include an exam and certain tests. Based on the results, you and your healthcare provider can then plan treatment. Until a diagnosis is made, the home care tips below can help ease symptoms.   Home care    Do pelvic floor muscle exercises, if they are prescribed. The pelvic floor muscles help support the bladder and urethra. Many women find that their symptoms improve when doing special exercises that strengthen these muscles. To do the exercises, contract the muscles you would use to stop your stream of urine. But do this when you re not urinating. Hold for 10 seconds, then relax. Repeat 10 to 20 times in a row, at least 3 times a day. Your healthcare provider may give you other instructions for how to do the exercises and how often.    Keep a bladder diary. This helps track how often and how much you urinate over a set period of time. Bring this diary with you to your next visit with the provider. The information can help your provider learn more about your bladder problem.    Lose weight, if advised to by your provider. Extra weight puts pressure on the bladder. Your provider can help you create a weight-loss plan that s right for you. This may include exercising more and making certain diet changes.    Don't have foods and drinks that may irritate the bladder. These can include alcohol and caffeinated drinks.    Quit smoking.  Smoking and other tobacco use can lead to a long-term (chronic) cough that strains the pelvic floor muscles. Smoking may also damage the bladder and urethra. Talk with your provider about treatments or methods you can use to quit smoking.    If drinking large amounts of fluid makes you have symptoms, you may be advised to limit your fluid intake. You may also be advised to drink most of your fluids during the day and to limit fluids at night.    If you re worried about urine leakage or accidents, you may wear absorbent pads to catch urine. Change the pads often. This helps reduce discomfort. It may also reduce the risk of skin or bladder infections.    Follow-up care  Follow up with your healthcare provider, or as directed. It may take some to find the right treatment for your problem. But healthy lifestyle changes can be made right away. These include such things as exercising on a regular basis, eating a healthy diet, losing weight (if needed), and quitting smoking. Your treatment plan may include special therapies or medicines. Certain procedures or surgery may also be options. Talk about any questions you have with your provider.   When to seek medical advice  Call the healthcare provider right away if any of these occur:    Fever of 100.4 F (38 C) or higher, or as directed by your provider    Bladder pain or fullness    Belly swelling    Nausea or vomiting    Back pain    Weakness, dizziness, or fainting  [employer] last reviewed this educational content on 1/1/2020 2000-2021 [employer]. All rights reserved. This information is not intended as a substitute for professional medical care. Always follow your healthcare professional's instructions.        Your Health Risk Assessment indicates you feel you are not in good emotional health.    Recreation   Recreation is not limited to sports and team events. It includes any activity that provides relaxation, interest, enjoyment, and exercise.  Recreation provides an outlet for physical, mental, and social energy. It can give a sense of worth and achievement. It can help you stay healthy.    Mental Exercise and Social Involvement  Mental and emotional health is as important as physical health. Keep in touch with friends and family. Stay as active as possible. Continue to learn and challenge yourself.   Things you can do to stay mentally active are:    Learn something new, like a foreign language or musical instrument.     Play SCRABBLE or do crossword puzzles. If you cannot find people to play these games with you at home, you can play them with others on your computer through the Internet.     Join a games club--anything from card games to chess or checkers or lawn bowling.     Start a new hobby.     Go back to school.     Volunteer.     Read.   Keep up with world events.

## 2022-06-30 NOTE — PROGRESS NOTES
"SUBJECTIVE:   Margaret Ramos is a 77 year old female who presents for Preventive Visit.      Patient has been advised of split billing requirements and indicates understanding: Yes  Are you in the first 12 months of your Medicare coverage?  No    Healthy Habits:     In general, how would you rate your overall health?  Good    Frequency of exercise:  None    Do you usually eat at least 4 servings of fruit and vegetables a day, include whole grains    & fiber and avoid regularly eating high fat or \"junk\" foods?  Yes    Taking medications regularly:  No    Barriers to taking medications:  Problems remembering to take them    Medication side effects:  None    Ability to successfully perform activities of daily living:  No assistance needed    Home Safety:  No safety concerns identified    Hearing Impairment:  No hearing concerns    In the past 6 months, have you been bothered by leaking of urine? Yes    In general, how would you rate your overall mental or emotional health?  Fair      PHQ-2 Total Score: 1    Additional concerns today:  No    Margaret is here for a preventive health visit.  Overall, she is doing well with the following concerns:      1. Her old accucheck is not working. She sometimes checks 4-5 times per day. Her goal blood sugar is 150, she doesn't feel good if she is below that. She prefers to check her blood sugar frequently, and manages her medications based on her blood sugar.    2. Metformin - had been on regular and XR metformin, as well as glipizide.  She has been educated on the risks of glyburide as far as hypoglycemia for her age.  She reports that she will use 1/2-1/4 tab glyburide as needed with her blood sugar is too high.  She likes that it works quickly.  At our most recent visit, we attempted to switch to completely XR metformin, but she felt it was not as effective.     3. Checks blood pressure at home occasionally.  She does have whitecoat hypertension, uncertain what home blood pressure " is.  Is resistant to taking losartan, feels she does not have an enough information about this.    Continues to struggle to take care of herself - takes care of her sister so has inconsistent meals, sleep, medications.     States she does not have time for therapy.     States that she has had full pneumoccal vaccine.     Do you feel safe in your environment? Yes    Have you ever done Advance Care Planning? (For example, a Health Directive, POLST, or a discussion with a medical provider or your loved ones about your wishes): No, advance care planning information given to patient to review.  Patient plans to discuss their wishes with loved ones or provider.         Fall risk  Fallen 2 or more times in the past year?: No  Any fall with injury in the past year?: No    Cognitive Screening   1) Repeat 3 items (Leader, Season, Table)    2) Clock draw: NORMAL  3) 3 item recall: Recalls 3 objects  Results: 3 items recalled: COGNITIVE IMPAIRMENT LESS LIKELY    Mini-CogTM Copyright DELICIA Ruby. Licensed by the author for use in [facility]; reprinted with permission (jacque@[facility]). All rights reserved.      Do you have sleep apnea, excessive snoring or daytime drowsiness?: yes    Reviewed and updated as needed this visit by clinical staff   Tobacco  Allergies  Meds  Problems  Med Hx  Surg Hx  Fam Hx  Soc   Hx          Reviewed and updated as needed this visit by Provider   Tobacco  Allergies  Meds  Problems  Med Hx  Surg Hx  Fam Hx           Social History     Tobacco Use     Smoking status: Never Smoker     Smokeless tobacco: Never Used   Substance Use Topics     Alcohol use: No     Alcohol/week: 0.0 standard drinks     If you drink alcohol do you typically have >3 drinks per day or >7 drinks per week? No    Alcohol Use 6/30/2022   Prescreen: >3 drinks/day or >7 drinks/week? No   Prescreen: >3 drinks/day or >7 drinks/week? -               Current providers sharing in care for this patient include:  "  Patient Care Team:  Zaina Ramirez MD as PCP - General (Internal Medicine)  Marianna Brink, RN as Personal Advocate & Liaison (PAL)  Tri García, EUSEBIO as Personal Advocate & Liaison (PAL)  Rosario Shannon MD as Hospitalist (Endocrinology, Diabetes, and Metabolism)  Zaina Ramirez MD as Assigned PCP    The following health maintenance items are reviewed in Epic and correct as of today:  Health Maintenance Due   Topic Date Due     COVID-19 Vaccine (1) Never done     ZOSTER IMMUNIZATION (1 of 2) Never done     Pneumococcal Vaccine: 65+ Years (2 - PCV) 12/27/2011     ASTHMA ACTION PLAN  09/12/2019     DIABETIC FOOT EXAM  09/13/2019       Pneumonia Vaccine: She insists she had 2 pneumonia vaccines in the past, does not want to get an additional due to fear of over vaccination.  There is only 1 pneumonia vaccine available through [facility] or our immunization records.      Pertinent mammograms are reviewed under the imaging tab.    Review of Systems   Breasts:  Negative for tenderness, breast mass and discharge.   Genitourinary: Negative for pelvic pain, vaginal bleeding and vaginal discharge.         OBJECTIVE:   BP (!) 150/66   Pulse 80   Temp 97.1  F (36.2  C)   Resp 16   Ht 1.568 m (5' 1.75\")   Wt 79.4 kg (175 lb)   LMP  (LMP Unknown)   SpO2 99%   BMI 32.27 kg/m   Estimated body mass index is 32.27 kg/m  as calculated from the following:    Height as of this encounter: 1.568 m (5' 1.75\").    Weight as of this encounter: 79.4 kg (175 lb).  Physical Exam  GENERAL APPEARANCE: healthy, alert and no distress  EYES: Eyes grossly normal to inspection, PERRL and conjunctivae and sclerae normal  HENT: ear canals and TM's normal, nose and mouth without ulcers or lesions, oropharynx clear and oral mucous membranes moist  NECK: no adenopathy, no asymmetry, masses, or scars and thyroid normal to palpation  RESP: lungs clear to auscultation - no rales, rhonchi or wheezes  CV: regular rate and rhythm, normal " S1 S2, no S3 or S4, no murmur, click or rub, no peripheral edema and peripheral pulses strong  MS: no musculoskeletal defects are noted and gait is age appropriate without ataxia  SKIN: no suspicious lesions or rashes  NEURO: Normal strength and tone, sensory exam grossly normal, mentation intact and speech normal  PSYCH: mentation appears normal and affect normal/bright    Diagnostic Test Results:  Labs reviewed in Epic    ASSESSMENT / PLAN:   (Z00.00) Routine general medical examination at a health care facility  (primary encounter diagnosis)  Comment: She is refusing vaccinations today with COVID-vaccine as well as pneumonia vaccine.  Otherwise, up-to-date on screening.      (E11.65) Type 2 diabetes mellitus with hyperglycemia, without long-term current use of insulin (H)  Comment: Not well controlled, has had stable A1c above 8 for a couple of years.  She is resistant to a daily medication regimen, insists that responding to her current blood sugar is the optimal way to treat her diabetes.  She feels that she is symptomatic with blood sugar below 150, she has a goal of keeping blood sugar below 200, ideally close to 150.  Plan: Med Therapy Management Referral, ACCU-CHEK         GUIDE test strip, blood glucose monitoring         (SOFTCLIX) lancets, Alcohol Swabs PADS,         atorvastatin (LIPITOR) 40 MG tablet, metFORMIN         (GLUCOPHAGE XR) 500 MG 24 hr tablet            (I25.10) ASCVD (arteriosclerotic cardiovascular disease)  Comment: Continue atorvastatin.  Plan: atorvastatin (LIPITOR) 40 MG tablet            (E78.2) Mixed hyperlipidemia (DYSLIPIDEMIA)  Plan: atorvastatin (LIPITOR) 40 MG tablet            (E11.9) Type 2 diabetes mellitus without complication, without long-term current use of insulin (H)  Comment: as above, agrees to visit with MTM.   Plan: metFORMIN (GLUCOPHAGE) 500 MG tablet            (E03.9) Hypothyroidism, unspecified type  Comment: at goal. Refill needed.   Plan: levothyroxine  "(SYNTHROID/LEVOTHROID) 112 MCG         tablet            (E21.3) Hyperparathyroidism (H)  Comment: continue to monitor.           COUNSELING:  Reviewed preventive health counseling, as reflected in patient instructions       Regular exercise       Healthy diet/nutrition       Immunizations    Declined: Pneumococcal due to Other feels she has had it            Estimated body mass index is 32.27 kg/m  as calculated from the following:    Height as of this encounter: 1.568 m (5' 1.75\").    Weight as of this encounter: 79.4 kg (175 lb).    Weight management plan: Discussed healthy diet and exercise guidelines    She reports that she has never smoked. She has never used smokeless tobacco.      Appropriate preventive services were discussed with this patient, including applicable screening as appropriate for cardiovascular disease, diabetes, osteopenia/osteoporosis, and glaucoma.  As appropriate for age/gender, discussed screening for colorectal cancer, prostate cancer, breast cancer, and cervical cancer. Checklist reviewing preventive services available has been given to the patient.    Reviewed patients plan of care and provided an AVS. The Basic Care Plan (routine screening as documented in Health Maintenance) for Margaret meets the Care Plan requirement. This Care Plan has been established and reviewed with the Patient.    Counseling Resources:  ATP IV Guidelines  Pooled Cohorts Equation Calculator  Breast Cancer Risk Calculator  Breast Cancer: Medication to Reduce Risk  FRAX Risk Assessment  ICSI Preventive Guidelines  Dietary Guidelines for Americans, 2010  USDA's MyPlate  ASA Prophylaxis  Lung CA Screening    Zaina Ramirez MD  [facility]    Identified Health Risks:    She is at risk for lack of exercise and has been provided with information to increase physical activity for the benefit of her well-being.  Information on urinary incontinence and treatment options given to patient.  The patient " was provided with suggestions to help her develop a healthy emotional lifestyle.

## 2022-06-30 NOTE — TELEPHONE ENCOUNTER
Patient called stating that the wrong statin was refilled today. Atorvastatin was ordered, patient has previously been on atorvastatin but it gave her arm muscle pain.     Patient would like her usual pravastatin ordered. Patient has been tolerating the medication well.     Jyoti Hutton RN on 6/30/2022 at 1:39 PM

## 2022-07-01 NOTE — TELEPHONE ENCOUNTER
Diabetes Education Scheduling Outreach #2:    Call to patient to schedule. No answer/busy.    Sofia Whalen OnCall  Diabetes and Nutrition Scheduling

## 2022-07-05 ENCOUNTER — TELEPHONE (OUTPATIENT)
Dept: PEDIATRICS | Facility: CLINIC | Age: 77
End: 2022-07-05

## 2022-07-05 NOTE — TELEPHONE ENCOUNTER
MTM referral from: [facility] visit (referral by provider)    MTM referral outreach attempt #2 on July 5, 2022 at 11:35 AM      Outcome: Patient not reachable after several attempts, will route to MTM Pharmacist/Provider as an FYI.  MT scheduling number is [phone].  Thank you for the referral.    Henri Paul, MTM coordinator

## 2022-07-08 ENCOUNTER — OFFICE VISIT (OUTPATIENT)
Dept: URGENT CARE | Facility: URGENT CARE | Age: 77
End: 2022-07-08
Payer: COMMERCIAL

## 2022-07-08 VITALS
TEMPERATURE: 98 F | HEART RATE: 88 BPM | DIASTOLIC BLOOD PRESSURE: 66 MMHG | OXYGEN SATURATION: 98 % | SYSTOLIC BLOOD PRESSURE: 150 MMHG

## 2022-07-08 DIAGNOSIS — L29.9 ITCHING: ICD-10-CM
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
## 2022-07-08 DIAGNOSIS — L30.9 DERMATITIS: ICD-10-CM

## 2022-07-08 PROCEDURE — 99213 OFFICE O/P EST LOW 20 MIN: CPT | Performed by: FAMILY MEDICINE

## 2022-07-08 RX ORDER — TRIAMCINOLONE ACETONIDE 1 MG/G
CREAM TOPICAL 3 TIMES DAILY
Qty: 60 G | Refills: 3 | Status: SHIPPED | OUTPATIENT
Start: 2022-07-08

## 2022-07-08 ASSESSMENT — PAIN SCALES - GENERAL: PAINLEVEL: NO PAIN (1)

## 2022-07-09 NOTE — PROGRESS NOTES
SUBJECTIVE:   Margaret Ramos is a 77 year old female presenting with a chief complaint of problems on the legs after being bitten by unknown insects seven days ago.  These insects crawled up her pant legs and bit her right inner thigh, the posterior left knee, and at the posterior and anterior left panty line.    .The right inner thigh has been itchy, weeping (clear colorless liquid.).  Initially there was a black hole in the center, but this has resolved. The left leg lesions are healing and getting smaller.      Onset of symptoms was seven days ago.  Course of illness is improving.    .    Severity severe itching.    Current and Associated symptoms: as listed above.    Treatment measures tried include Calamine lotion, Liquid Benadryl stick/pen.    ..    No fevers        Past Medical History:   Diagnosis Date     ASCVD (arteriosclerotic cardiovascular disease) 8/10/2016     Chronic ischemic heart disease 6/13/2016     Hypercalcemia 12/18/2018     Hyperparathyroidism (H) 1/4/2018     Hypothyroidism 10/20/2014     Inferior MI (H) 8/10/2016    2016      Mixed hyperlipidemia (DYSLIPIDEMIA) 10/20/2014     Morbid obesity (H) 10/13/2015     S/P drug eluting coronary stent placement 8/10/2016    RCA 6/2016      Type 2 diabetes mellitus with hyperglycemia (H) 10/20/2014     Current Outpatient Medications   Medication Sig Dispense Refill     ACCU-CHEK GUIDE test strip Use to test blood sugar 4 times daily. 400 strip 4     ACE NOT PRESCRIBED, INTENTIONAL, ACE Inhibitor not prescribed due to Refusal by patient       ACE/ARB NOT PRESCRIBED, INTENTIONAL, ACE & ARB not prescribed due to Symptomatic hypotension not due to excessive diuresis       albuterol (PROVENTIL) (2.5 MG/3ML) 0.083% neb solution Take 1 vial (2.5 mg) by nebulization every 6 hours as needed for shortness of breath / dyspnea or wheezing 360 mL 3     alcohol swab prep pads Use to swab area of injection/bebe as directed. 100 each 6     Alcohol Swabs PADS Use with  each blood sugar check 400 each 3     ascorbic acid (VITAMIN C) 500 MG tablet Take 1 tablet by mouth       aspirin 81 MG EC tablet Take 1 tablet (81 mg) by mouth daily 90 tablet 3     Astaxanthin 4 MG CAPS Take 1 tablet by mouth daily       blood glucose (ACCU-CHEK JINA PLUS) test strip USE AS DIRECTED UP TO 6 TIMES DAILY 400 strip 1     blood glucose (ACCU-CHEK SOFTCLIX) lancing device Lancing device to be used with lancets. 1 each 0     blood glucose (NO BRAND SPECIFIED) lancets standard Use to test blood sugar 4 times daily or as directed. 1 each 11     blood glucose monitoring (NO BRAND SPECIFIED) meter device kit Use to test blood sugar 4 times daily or as directed. 1 kit 0     blood glucose monitoring (SOFTCLIX) lancets Use to test blood sugar 4 times daily. 400 each 4     blood glucose monitoring (SOFTCLIX) lancets Use to test blood sugar 4 times daily. 11 each 6     blood glucose monitoring (SOFTCLIX) lancets Use as directed upto 4 times daily 300 each 11     calcium citrate-vitamin D (CITRACAL) 315-250 MG-UNIT TABS per tablet Take 1 tablet by mouth daily       calcium-magnesium (CALMAG) 500-250 MG TABS per tablet Take 1 tablet by mouth daily Calcium Magnesium Intensive Care: Each Tablet: 15 mg Vit C, 125 international units  VIt D, 250 mg Calcium carbonate, 100 mg Mg       Carboxymethylcellulose Sod PF (REFRESH PLUS) 0.5 % SOLN ophthalmic solution 1 drop daily as needed for dry eyes       Cholecalciferol (VITAMIN D3 PO) Take by mouth 2 times daily        Coenzyme Q10 (COQ-10) 200 MG CAPS Take 200 mg by mouth daily       DiphenhydrAMINE HCl (BENADRYL PO) Take 6.25 mg by mouth as needed       glyBURIDE (DIABETA /MICRONASE) 2.5 MG tablet Take 1 tablet (2.5 mg) by mouth 2 times daily (with meals) 270 tablet 3     ketoconazole (NIZORAL) 2 % external cream Apply topically 2 times daily 60 g 1     levothyroxine (SYNTHROID/LEVOTHROID) 112 MCG tablet TAKE 1 TABLET BY MOUTH EVERY DAY 90 tablet 2      LUTEIN-ZEAXANTHIN-BILBERRY PO Take 1 capsule by mouth 2 times daily 10 mg lutein, 5 mg zeaxanthin, 2000 international, Vit A, 200 mg VIt C, 60 Vit E, Bioflavonoid complex, green tea, epigallocatechin gallate, grape seed, black elder extract       MAGNESIUM MALATE PO Take 0.5 tablets by mouth 2 times daily       metFORMIN (GLUCOPHAGE) 500 MG tablet Take 1 tablet (500 mg) by mouth daily (with breakfast) AND 2 tablets (1,000 mg) daily (with dinner). 60 tablet 0     Multiple Vitamins-Calcium (ESSENTIAL ONE DAILY MULTIVIT PO) Take 1 capsule by mouth daily       Multiple Vitamins-Minerals (CENTRUM SILVER) per tablet daily (every 24 hours).       nitroGLYcerin (NITROSTAT) 0.4 MG sublingual tablet Place 1 tablet (0.4 mg) under the tongue every 5 minutes as needed for chest pain 25 tablet 1     Omega-3 Fatty Acids (OMEGA ESSENTIALS BASIC PO) Take 1 capsule by mouth daily       pravastatin (PRAVACHOL) 20 MG tablet Take 1 tablet (20 mg) by mouth daily 90 tablet 3     VITAMIN K PO Take 300 mcg by mouth daily       metFORMIN (GLUCOPHAGE XR) 500 MG 24 hr tablet Take 1 tablet (500 mg) by mouth daily (with breakfast) for 360 days (Patient not taking: Reported on 7/8/2022) 90 tablet 3     Social History     Tobacco Use     Smoking status: Never Smoker     Smokeless tobacco: Never Used   Substance Use Topics     Alcohol use: No     Alcohol/week: 0.0 standard drinks       ROS:  CONSTITUTIONAL:negative for fevers.   INTEGUMENTARY/SKIN:  Positive for very itchy red lesions.      OBJECTIVE:  BP (!) 150/66   Pulse 88   Temp 98  F (36.7  C)   LMP  (LMP Unknown)   SpO2 98%   GENERAL APPEARANCE: healthy, alert and no distress  SKIN: right medial proximal thigh has a well-circumscribed area of confluent erythema without any bulla/vesicles/honey colored crusting/wheals/bullseye pattern.  No red streaks.  The posterior left knee and the anterior inguinal region and the lower left buttock area have single oval-shaped red macules without any  discharge/bullseye pattern/vesicles/crusting.      ASSESSMENT:  Itching  Dermatitis from unknown insects that crawled up her pant legs.  The dermatitis has been improving since one week ago.    Bug Bites    PLAN:    Rx:  Triamcinolone 0.1% Cream  Sarna Lotion  Aveeno Oatmeal Baths for the itchy skin  follow up with the primary care provider or here if not better in one week.   Place warmth over the red lesions.      Caden Joseph MD

## 2022-07-09 NOTE — PATIENT INSTRUCTIONS
Apply Sarna Lotion onto the itchy areas of the legs.  Do not apply onto the area at the same time as the Triamcinolone Cream.      Place warmth over the red areas of the legs for 20 minutes at a time, every 2 hours while awake to heal the wounds.      Follow up with your primary care provider's clinic if not better in 1 week.      Try Aveeno Oatmeal Baths for the itchy skin.

## 2022-12-02 DIAGNOSIS — E11.9 TYPE 2 DIABETES MELLITUS WITHOUT COMPLICATION, WITHOUT LONG-TERM CURRENT USE OF INSULIN (H): ICD-10-CM

## 2022-12-02 NOTE — TELEPHONE ENCOUNTER
Routing refill request to provider for review/approval because:  Labs not current: A1c   A break in medication    Hemoglobin A1C   Date Value Ref Range Status   06/17/2022 8.7 (H) 0.0 - 5.6 % Final     Comment:     Normal <5.7%   Prediabetes 5.7-6.4%    Diabetes 6.5% or higher     Note: Adopted from ADA consensus guidelines.   01/15/2021 8.7 (H) 0 - 5.6 % Final     Comment:     Normal <5.7% Prediabetes 5.7-6.4%  Diabetes 6.5% or higher - adopted from ADA   consensus guidelines.  Reviewed: OK with previous       Ally TERRAZAS RN   Patient Advocate Liaison (PAL)  [facility]

## 2022-12-02 NOTE — TELEPHONE ENCOUNTER
Patient states she takes both the XR and regular. Pt is out of the regular.    Sofia Chakraborty on 12/2/2022 at 1:48 PM

## 2022-12-09 ENCOUNTER — OFFICE VISIT (OUTPATIENT)
Dept: PEDIATRICS | Facility: CLINIC | Age: 77
End: 2022-12-09
Payer: COMMERCIAL

## 2022-12-09 VITALS
HEART RATE: 85 BPM | OXYGEN SATURATION: 99 % | BODY MASS INDEX: 31.63 KG/M2 | WEIGHT: 171.9 LBS | HEIGHT: 62 IN | SYSTOLIC BLOOD PRESSURE: 148 MMHG | DIASTOLIC BLOOD PRESSURE: 68 MMHG | TEMPERATURE: 98.1 F | RESPIRATION RATE: 14 BRPM

## 2022-12-09 DIAGNOSIS — E78.2 MIXED HYPERLIPIDEMIA: ICD-10-CM

## 2022-12-09 DIAGNOSIS — I10 ESSENTIAL HYPERTENSION: Primary | ICD-10-CM

## 2022-12-09 DIAGNOSIS — E11.9 TYPE 2 DIABETES MELLITUS WITHOUT COMPLICATION, WITHOUT LONG-TERM CURRENT USE OF INSULIN (H): ICD-10-CM

## 2022-12-09 LAB — HBA1C MFR BLD: 9 % (ref 0–5.6)

## 2022-12-09 PROCEDURE — 99214 OFFICE O/P EST MOD 30 MIN: CPT | Performed by: STUDENT IN AN ORGANIZED HEALTH CARE EDUCATION/TRAINING PROGRAM

## 2022-12-09 PROCEDURE — 82043 UR ALBUMIN QUANTITATIVE: CPT | Performed by: STUDENT IN AN ORGANIZED HEALTH CARE EDUCATION/TRAINING PROGRAM

## 2022-12-09 PROCEDURE — 83036 HEMOGLOBIN GLYCOSYLATED A1C: CPT | Performed by: STUDENT IN AN ORGANIZED HEALTH CARE EDUCATION/TRAINING PROGRAM

## 2022-12-09 PROCEDURE — 36415 COLL VENOUS BLD VENIPUNCTURE: CPT | Performed by: STUDENT IN AN ORGANIZED HEALTH CARE EDUCATION/TRAINING PROGRAM

## 2022-12-09 PROCEDURE — 80048 BASIC METABOLIC PNL TOTAL CA: CPT | Performed by: STUDENT IN AN ORGANIZED HEALTH CARE EDUCATION/TRAINING PROGRAM

## 2022-12-09 RX ORDER — BLOOD SUGAR DIAGNOSTIC
STRIP MISCELLANEOUS
Qty: 400 STRIP | Refills: 4 | Status: SHIPPED | OUTPATIENT
Start: 2022-12-09 | End: 2024-02-06

## 2022-12-09 RX ORDER — LOSARTAN POTASSIUM 25 MG/1
25 TABLET ORAL DAILY
Qty: 90 TABLET | Refills: 3 | Status: SHIPPED | OUTPATIENT
Start: 2022-12-09 | End: 2024-01-31

## 2022-12-09 RX ORDER — ROSUVASTATIN CALCIUM 20 MG/1
20 TABLET, COATED ORAL DAILY
Qty: 90 TABLET | Refills: 3 | Status: CANCELLED | OUTPATIENT
Start: 2022-12-09

## 2022-12-09 NOTE — PROGRESS NOTES
"  Assessment & Plan   Patient here for follow up of diabetes mellitus, hypertension, hyperlipidemia.  Patient spent most of the visit talking about a cough years ago worked up at [facility] and [facility] - Reports had IV drip of prednisone and blood pressure of 313/100 - and her last visit where she requested the manufacturing print out for ARB medications and several years of medical doctors telling her she has high blood pressure but patient does not want treatment and taking care of her 81 year old sister and issues with getting sued for not paying taxes. \"We are on the verge of World War 3 and I'm reading revelations.\" Difficult to keep her focused and make much progress today.    Essential hypertension  Patient agreeable to starting ARB. Recommend 12.5mg daily and recheck BMP in 2 weeks.  - Albumin Random Urine Quantitative with Creat Ratio; Future  - losartan (COZAAR) 25 MG tablet; Take 1 tablet (25 mg) by mouth daily  - Albumin Random Urine Quantitative with Creat Ratio    Mixed hyperlipidemia (DYSLIPIDEMIA)  Unable to develop plan with patient due to tangential conversations she would continually start. Reports intolerance to atorvastatin and pravastatin due to bilateral upper extremity muscle pain. Currently not taking statin. Last LDL >200. Patient wants to recheck when fasting. Can get with BMP in 2 weeks. Would recommend rosuvastatin trial. Consider MTM referral in future  - CK total; Future  - Lipid Profile (Chol, Trig, HDL, LDL calc); Future    Type 2 diabetes mellitus without complication, without long-term current use of insulin (H)  On metformin. Has two glucometers (oen for near the recliner and one for another part of the house) and requesting multiple prescription for test strips and supplies. Difficult to assess what she is actually requesting. If issues really think MTM would be beneficial.  - Albumin Random Urine Quantitative with Creat Ratio; Future  - HEMOGLOBIN A1C; " Future  - **Basic metabolic panel FUTURE 14d; Future  - blood glucose (ACCU-CHEK JINA PLUS) test strip; USE AS DIRECTED UP TO 6 TIMES DAILY  - Albumin Random Urine Quantitative with Creat Ratio  - HEMOGLOBIN A1C  - **Basic metabolic panel FUTURE 14d  - Parathyroid Hormone Intact; Future  - Ionized Calcium; Future      Return in about 2 weeks (around 12/23/2022) for Follow up - labs.    Millicent Burgos MD  [facility] [place] is a 77 year old, presenting for the following health issues:  Wound Check      HPI   Pt is in for a 6 month follow up from 06/30/2022.     Diabetes Follow-up    How often are you checking your blood sugar? Before meals. Breakfast & Supper     What concerns do you have today about your diabetes? None     Do you have any of these symptoms? (Select all that apply)  No numbness or tingling in feet.  No redness, sores or blisters on feet.  No complaints of excessive thirst.  No reports of blurry vision.  No significant changes to weight.    Have you had a diabetic eye exam in the last 12 months? No        BP Readings from Last 2 Encounters:   12/09/22 (!) 148/68   07/08/22 (!) 150/66     Hemoglobin A1C (%)   Date Value   06/17/2022 8.7 (H)   11/02/2021 8.7 (H)   01/15/2021 8.7 (H)   03/10/2020 8.1 (H)     LDL Cholesterol Calculated (mg/dL)   Date Value   06/17/2022 200 (H)   11/02/2021 233 (H)   01/15/2021 209 (H)   07/23/2019 114 (H)                 How many servings of fruits and vegetables do you eat daily?  0-1    On average, how many sweetened beverages do you drink each day (Examples: soda, juice, sweet tea, etc.  Do NOT count diet or artificially sweetened beverages)?   0    How many days per week do you exercise enough to make your heart beat faster? 0 days    How many minutes a day do you exercise enough to make your heart beat faster? 0 minutes    How many days per week do you miss taking your medication? 0          Objective    BP (!) 148/68 (BP  "Location: Right arm, Patient Position: Sitting, Cuff Size: Adult Regular)   Pulse 85   Temp 98.1  F (36.7  C) (Temporal)   Resp 14   Ht 1.575 m (5' 2\")   Wt 78 kg (171 lb 14.4 oz)   LMP  (LMP Unknown)   SpO2 99%   BMI 31.44 kg/m    Body mass index is 31.44 kg/m .  Physical Exam   GEN: Alert and appropriately interactive for age  EYES: Eyes grossly normal to inspection, conjunctivae and sclerae normal  RESP: Breathing comfortably on room air   CV: Warm and well perfused peripheral extremities   MS: no gross musculoskeletal defects noted, no edema  NEURO: Alert and oriented. Speech fluent.    PSYCH: Affect normal/bright. Appearance well groomed. Tangential speech.              "

## 2022-12-09 NOTE — LETTER
December 12, 2022      Margaret Ramos  [address] [place] [address]        Dear Margaret,     Here are your recent lab results:     There is a normal amount of protein in your urine.     Your electrolytes and kidney function were normal, except the Calcium is slightly high. We should repeat it with your cholesterol test in a few weeks. I placed the orders for this.     Your A1c is slightly higher at 9%.         Please let us know if you have questions or concerns.       Best,   Millicent Burgos MD   Internal Medicine & Pediatrics   [facility]       Resulted Orders   Albumin Random Urine Quantitative with Creat Ratio   Result Value Ref Range    Albumin Urine mg/L <12.0 mg/L      Comment:      The reference ranges have not been established in urine albumin. The results should be integrated into the clinical context for interpretation.    Albumin Urine mg/g Cr        Comment:      Unable to calculate, urine albumin and/or urine creatinine is outside detectable limits.  Microalbuminuria is defined as an albumin:creatinine ratio of 17 to 299 for males and 25 to 299 for females. A ratio of albumin:creatinine of 300 or higher is indicative of overt proteinuria.  Due to biologic variability, positive results should be confirmed by a second, first-morning random or 24-hour timed urine specimen. If there is discrepancy, a third specimen is recommended. When 2 out of 3 results are in the microalbuminuria range, this is evidence for incipient nephropathy and warrants increased efforts at glucose control, blood pressure control, and institution of therapy with an angiotensin-converting-enzyme (ACE) inhibitor (if the patient can tolerate it).      Creatinine Urine mg/dL 14.0 mg/dL      Comment:      The reference ranges have not been established in urine creatinine. The results should be integrated into the clinical context for interpretation.   HEMOGLOBIN A1C   Result Value Ref Range    Hemoglobin A1C 9.0 (H) 0.0  - 5.6 %      Comment:      Normal <5.7%   Prediabetes 5.7-6.4%    Diabetes 6.5% or higher     Note: Adopted from ADA consensus guidelines.    Narrative    Reviewed, ok with previous     **Basic metabolic panel FUTURE 14d   Result Value Ref Range    Sodium 137 136 - 145 mmol/L    Potassium 4.9 3.4 - 5.3 mmol/L    Chloride 103 98 - 107 mmol/L    Carbon Dioxide (CO2) 26 22 - 29 mmol/L    Anion Gap 8 7 - 15 mmol/L    Urea Nitrogen 14.2 8.0 - 23.0 mg/dL    Creatinine 0.77 0.51 - 0.95 mg/dL    Calcium 10.5 (H) 8.8 - 10.2 mg/dL    Glucose 182 (H) 70 - 99 mg/dL    GFR Estimate 79 >60 mL/min/1.73m2      Comment:      Effective December 21, 2021 eGFRcr in adults is calculated using the 2021 CKD-EPI creatinine equation which includes age and gender (Jimena scott al., NEJM, DOI: 10.1056/FZBZnx6757454)

## 2022-12-09 NOTE — PATIENT INSTRUCTIONS
Can take half a pill of the losartan (Cozaar) to start.      Make a lab only appointment in 2 weeks - for the BMP and lipids

## 2022-12-10 LAB
ANION GAP SERPL CALCULATED.3IONS-SCNC: 8 MMOL/L (ref 7–15)
BUN SERPL-MCNC: 14.2 MG/DL (ref 8–23)
CALCIUM SERPL-MCNC: 10.5 MG/DL (ref 8.8–10.2)
CHLORIDE SERPL-SCNC: 103 MMOL/L (ref 98–107)
CREAT SERPL-MCNC: 0.77 MG/DL (ref 0.51–0.95)
CREAT UR-MCNC: 14 MG/DL
DEPRECATED HCO3 PLAS-SCNC: 26 MMOL/L (ref 22–29)
GFR SERPL CREATININE-BSD FRML MDRD: 79 ML/MIN/1.73M2
GLUCOSE SERPL-MCNC: 182 MG/DL (ref 70–99)
MICROALBUMIN UR-MCNC: <12 MG/L
MICROALBUMIN/CREAT UR: NORMAL MG/G{CREAT}
POTASSIUM SERPL-SCNC: 4.9 MMOL/L (ref 3.4–5.3)
SODIUM SERPL-SCNC: 137 MMOL/L (ref 136–145)

## 2023-01-19 DIAGNOSIS — E03.9 HYPOTHYROIDISM, UNSPECIFIED TYPE: ICD-10-CM

## 2023-01-19 RX ORDER — LEVOTHYROXINE SODIUM 112 UG/1
TABLET ORAL
Qty: 90 TABLET | Refills: 2 | OUTPATIENT
Start: 2023-01-19

## 2023-01-19 NOTE — TELEPHONE ENCOUNTER
Already approved. levothyroxine (SYNTHROID/LEVOTHROID) 112 MCG tablet 90 tablet 2 6/30/2022. Should have refills on file.     EUSEBIO George (Patient Advocate Liason)  [facility]

## 2023-07-18 ENCOUNTER — PATIENT OUTREACH (OUTPATIENT)
Dept: PEDIATRICS | Facility: CLINIC | Age: 78
End: 2023-07-18
Payer: COMMERCIAL

## 2023-07-18 NOTE — TELEPHONE ENCOUNTER
Patient Quality Outreach Health Maintenance - PAL RN    Summary:    PAL RN contacted pt regarding overdue health maintenance    Patient is due/failing the following:   Diabetes -  A1C, Eye Exam and Foot Exam  Asthma  -  ACT needed  Physical Annual Wellness Visit      Topic Date Due     COVID-19 Vaccine (1) Never done     Zoster (Shingles) Vaccine (1 of 2) Never done     Pneumococcal Vaccine (2 - PCV) 12/27/2011     Diptheria Tetanus Pertussis (DTAP/TDAP/TD) Vaccine (2 - Td or Tdap) 12/18/2022       Health Maintenance Due   Topic Date Due     COVID-19 Vaccine (1) Never done     ZOSTER IMMUNIZATION (1 of 2) Never done     Pneumococcal Vaccine: 65+ Years (2 - PCV) 12/27/2011     ASTHMA ACTION PLAN  09/12/2019     DIABETIC FOOT EXAM  09/13/2019     EYE EXAM  11/18/2022     DTAP/TDAP/TD IMMUNIZATION (2 - Td or Tdap) 12/18/2022     ASTHMA CONTROL TEST  12/30/2022     PHQ-2 (once per calendar year)  01/01/2023     A1C  06/09/2023     LIPID  06/17/2023     TSH W/FREE T4 REFLEX  06/17/2023     ANNUAL REVIEW OF HM ORDERS  06/30/2023     FALL RISK ASSESSMENT  06/30/2023     MEDICARE ANNUAL WELLNESS VISIT  06/30/2023       Type of outreach:    Sent letter.   ACT also enclosed for completion.    Questions for provider review:    None                                                                                     Chart routed to -No need to route maria luisa Merritt RN

## 2023-07-18 NOTE — LETTER
July 19, 2023      Margaret Ramos  [address] [place] [address]      Your healthcare team cares about your health. To provide you with the best care, we have reviewed your chart and based on our findings, we see that you are due to:     Schedule a DIABETIC FOLLOW UP appointment for Office Visit. Patients with diabetes should see their provider regularly.  Schedule a DIABETIC EYE EXAM.  This exam is done with an optometrist, annually. You can schedule this appointment with your eye doctor.  If you need a referral, please let us know.  PREVENTATIVE VISIT: Annual Medicare Wellness:Schedule an Annual Medicare Wellness Exam. Please [facility] to set up your appointment.  OTHER FOLLOW UP: We are enclosing a questionnaire to assess your breathing,  we will contact you in a few weeks to review the questionnaire with you, or you may mail the completed form back to us.    If you have already completed these items, please contact the clinic via phone   Thank you for choosing [facility] for your healthcare needs. For any questions, concerns, or to schedule an appointment please contact the clinic.    Please call Dr. Ramirez's , Sofia at [phone], and she will assist you in scheduling an appointment.    Thank you,  Rose Marie CHACON RN  Patient Advocate Liason (PAL)  [facility]

## 2023-07-18 NOTE — LETTER
My Asthma Action Plan    Name: Margaret Ramos   YOB: 1945  Date: 7/19/2023   My doctor: Dr. Zaina Ramirez   My clinic: [facility] [place]        My Rescue Medicine:   Albuterol nebulizer 2.5 mg every 6 hrs as needed for shortness of breath or wheezing   My Asthma Severity:   Intermittent / Exercise Induced  Know your asthma triggers:  Unknown             GREEN ZONE   Good Control  I feel good  No cough or wheeze  Can work, sleep and play without asthma symptoms       Take your asthma control medicine every day.     If exercise triggers your asthma, take your rescue medication  15 minutes before exercise or sports, and  During exercise if you have asthma symptoms  Spacer to use with inhaler: If you have a spacer, make sure to use it with your inhaler             YELLOW ZONE Getting Worse  I have ANY of these:  I do not feel good  Cough or wheeze  Chest feels tight  Wake up at night   Keep taking your Green Zone medications  Start taking your rescue medicine:  every 20 minutes for up to 1 hour. Then every 4 hours for 24-48 hours.  If you stay in the Yellow Zone for more than 12-24 hours, contact your doctor.  If you do not return to the Green Zone in 12-24 hours or you get worse, start taking your oral steroid medicine if prescribed by your provider.           RED ZONE Medical Alert - Get Help  I have ANY of these:  I feel awful  Medicine is not helping  Breathing getting harder  Trouble walking or talking  Nose opens wide to breathe       Take your rescue medicine NOW  If your provider has prescribed an oral steroid medicine, start taking it NOW  Call your doctor NOW  If you are still in the Red Zone after 20 minutes and you have not reached your doctor:  Take your rescue medicine again and  Call 911 or go to the emergency room right away    See your regular doctor within 2 weeks of an Emergency Room or Urgent Care visit for follow-up treatment.          Annual Reminders:  Meet with Asthma  Educator,  Flu Shot in the Fall, consider Pneumonia Vaccination for patients with asthma (aged 19 and older).    Pharmacy:    [id number] - [place] - [address] AT [facility] [address]  [id number] - [place] - [address]  WRITTEN PRESCRIPTION REQUESTED    Electronically signed by Paula Merritt RN   Date: 07/19/23                    Asthma Triggers  How To Control Things That Make Your Asthma Worse    Triggers are things that make your asthma worse.  Look at the list below to help you find your triggers and   what you can do about them. You can help prevent asthma flare-ups by staying away from your triggers.      Trigger                                                          What you can do   Cigarette Smoke  Tobacco smoke can make asthma worse. Do not allow smoking in your home, car or around you.  Be sure no one smokes at a child s day care or school.  If you smoke, ask your health care provider for ways to help you quit.  Ask family members to quit too.  Ask your health care provider for a referral to Quit Plan to help you quit smoking, or call 7-987-821-PLAN.     Colds, Flu, Bronchitis  These are common triggers of asthma. Wash your hands often.  Don t touch your eyes, nose or mouth.  Get a flu shot every year.     Dust Mites  These are tiny bugs that live in cloth or carpet. They are too small to see. Wash sheets and blankets in hot water every week.   Encase pillows and mattress in dust mite proof covers.  Avoid having carpet if you can. If you have carpet, vacuum weekly.   Use a dust mask and HEPA vacuum.   Pollen and Outdoor Mold  Some people are allergic to trees, grass, or weed pollen, or molds. Try to keep your windows closed.  Limit time out doors when pollen count is high.   Ask you health care provider about taking medicine during allergy season.     Animal Dander  Some people are allergic to skin flakes, urine or saliva from pets with fur or  feathers. Keep pets with fur or feathers out of your home.    If you can t keep the pet outdoors, then keep the pet out of your bedroom.  Keep the bedroom door closed.  Keep pets off cloth furniture and away from stuffed toys.     Mice, Rats, and Cockroaches  Some people are allergic to the waste from these pests.   Cover food and garbage.  Clean up spills and food crumbs.  Store grease in the refrigerator.   Keep food out of the bedroom.   Indoor Mold  This can be a trigger if your home has high moisture. Fix leaking faucets, pipes, or other sources of water.   Clean moldy surfaces.  Dehumidify basement if it is damp and smelly.   Smoke, Strong Odors, and Sprays  These can reduce air quality. Stay away from strong odors and sprays, such as perfume, powder, hair spray, paints, smoke incense, paint, cleaning products, candles and new carpet.   Exercise or Sports  Some people with asthma have this trigger. Be active!  Ask your doctor about taking medicine before sports or exercise to prevent symptoms.    Warm up for 5-10 minutes before and after sports or exercise.     Other Triggers of Asthma  Cold air:  Cover your nose and mouth with a scarf.  Sometimes laughing or crying can be a trigger.  Some medicines and food can trigger asthma.

## 2023-07-18 NOTE — LETTER
July 19, 2023      Margaret Ramos  [address] [place] [address]      Your healthcare team cares about your health. To provide you with the best care, we have reviewed your chart and based on our findings, we see that you are due to:     {Diabetes Follow Up:068194}  {PREVENTATIVE VISIT:950176:::1}  {OTHER FOLLOW UP:621069}    If you have already completed these items, please contact the clinic via phone or Snatch that [employer] so your care team can review and update your records.  Thank you for choosing [facility] for your healthcare needs. For any questions, concerns, or to schedule an appointment please contact the clinic.       Healthy Regards,      Your [facility] Care Team

## 2023-08-22 DIAGNOSIS — E11.65 TYPE 2 DIABETES MELLITUS WITH HYPERGLYCEMIA, WITHOUT LONG-TERM CURRENT USE OF INSULIN (H): ICD-10-CM

## 2023-08-22 NOTE — LETTER
September 7, 2023      Margaret Ramos  [address] [place] [address]        Dear Margaret,     We have been attempting to reach you at [phone] but the line appears always busy. Our records show you are due for a diabetic visit for further refills of your blood glucose monitoring lancets. Please call the clinic at [phone] to schedule.          Sincerely,        [facility]

## 2023-08-23 RX ORDER — LANCETS
EACH MISCELLANEOUS
Qty: 400 EACH | Refills: 0 | Status: SHIPPED | OUTPATIENT
Start: 2023-08-23

## 2023-08-23 NOTE — TELEPHONE ENCOUNTER
Lab Results   Component Value Date    A1C 9.0 12/09/2022   Medication is being filled for 1 time refill only due to:  Patient needs to be seen because DM visit due.  Prescription approved per [facility] Refill Protocol.  Routed to  for scheduling  Kimberly Orellana RN, BSN  [facility]

## 2023-09-06 DIAGNOSIS — E11.65 TYPE 2 DIABETES MELLITUS WITH HYPERGLYCEMIA, WITHOUT LONG-TERM CURRENT USE OF INSULIN (H): Primary | ICD-10-CM

## 2023-09-06 DIAGNOSIS — E03.9 HYPOTHYROIDISM, UNSPECIFIED TYPE: ICD-10-CM

## 2023-09-08 RX ORDER — LEVOTHYROXINE SODIUM 112 UG/1
TABLET ORAL
Qty: 90 TABLET | Refills: 0 | Status: SHIPPED | OUTPATIENT
Start: 2023-09-08 | End: 2023-12-05

## 2023-09-08 NOTE — TELEPHONE ENCOUNTER
Routing to prescribing provider to inform of needed labs per protocol: no TSH on file in past 12 months, please order at next visit  TSH   Date Value Ref Range Status   06/17/2022 0.80 0.40 - 4.00 mU/L Final   01/15/2021 3.96 0.40 - 4.00 mU/L Final   01/15/2021 3.96 0.40 - 4.00 mU/L Final       Prescription approved per [facility] Refill Protocol.  Marylin HANNON RN

## 2023-09-13 NOTE — TELEPHONE ENCOUNTER
Spoke to patient  she will call back to schedule a lab appointment before her current rx runs out.

## 2024-01-10 ENCOUNTER — LAB (OUTPATIENT)
Dept: LAB | Facility: CLINIC | Age: 79
End: 2024-01-10
Payer: COMMERCIAL

## 2024-01-10 DIAGNOSIS — E03.9 HYPOTHYROIDISM, UNSPECIFIED TYPE: ICD-10-CM

## 2024-01-10 DIAGNOSIS — E11.65 TYPE 2 DIABETES MELLITUS WITH HYPERGLYCEMIA, WITHOUT LONG-TERM CURRENT USE OF INSULIN (H): ICD-10-CM

## 2024-01-10 LAB — HBA1C MFR BLD: 9.6 % (ref 0–5.6)

## 2024-01-10 PROCEDURE — 80053 COMPREHEN METABOLIC PANEL: CPT

## 2024-01-10 PROCEDURE — 84443 ASSAY THYROID STIM HORMONE: CPT

## 2024-01-10 PROCEDURE — 83036 HEMOGLOBIN GLYCOSYLATED A1C: CPT

## 2024-01-10 PROCEDURE — 80061 LIPID PANEL: CPT

## 2024-01-10 PROCEDURE — 36415 COLL VENOUS BLD VENIPUNCTURE: CPT

## 2024-01-10 PROCEDURE — 84439 ASSAY OF FREE THYROXINE: CPT

## 2024-01-11 LAB
ALBUMIN SERPL BCG-MCNC: 4.3 G/DL (ref 3.5–5.2)
ALP SERPL-CCNC: 134 U/L (ref 40–150)
ALT SERPL W P-5'-P-CCNC: 28 U/L (ref 0–50)
ANION GAP SERPL CALCULATED.3IONS-SCNC: 9 MMOL/L (ref 7–15)
AST SERPL W P-5'-P-CCNC: 28 U/L (ref 0–45)
BILIRUB SERPL-MCNC: 0.5 MG/DL
BUN SERPL-MCNC: 15 MG/DL (ref 8–23)
CALCIUM SERPL-MCNC: 11.4 MG/DL (ref 8.8–10.2)
CHLORIDE SERPL-SCNC: 101 MMOL/L (ref 98–107)
CHOLEST SERPL-MCNC: 281 MG/DL
CREAT SERPL-MCNC: 0.83 MG/DL (ref 0.51–0.95)
DEPRECATED HCO3 PLAS-SCNC: 27 MMOL/L (ref 22–29)
EGFRCR SERPLBLD CKD-EPI 2021: 72 ML/MIN/1.73M2
FASTING STATUS PATIENT QL REPORTED: YES
GLUCOSE SERPL-MCNC: 202 MG/DL (ref 70–99)
HDLC SERPL-MCNC: 76 MG/DL
LDLC SERPL CALC-MCNC: 184 MG/DL
NONHDLC SERPL-MCNC: 205 MG/DL
POTASSIUM SERPL-SCNC: 4.7 MMOL/L (ref 3.4–5.3)
PROT SERPL-MCNC: 7.1 G/DL (ref 6.4–8.3)
SODIUM SERPL-SCNC: 137 MMOL/L (ref 135–145)
T4 FREE SERPL-MCNC: 1.22 NG/DL (ref 0.9–1.7)
TRIGL SERPL-MCNC: 105 MG/DL
TSH SERPL DL<=0.005 MIU/L-ACNC: 9.5 UIU/ML (ref 0.3–4.2)

## 2024-01-12 ENCOUNTER — TELEPHONE (OUTPATIENT)
Dept: PEDIATRICS | Facility: CLINIC | Age: 79
End: 2024-01-12
Payer: COMMERCIAL

## 2024-01-12 DIAGNOSIS — E03.9 HYPOTHYROIDISM, UNSPECIFIED TYPE: ICD-10-CM

## 2024-01-12 DIAGNOSIS — E83.52 HYPERCALCEMIA: Primary | ICD-10-CM

## 2024-01-12 NOTE — LETTER
January 12, 2024      Margaret Ramos  [address] [place] [address]        Hi Margaret,    Here is your recent lab results.     Component      Latest Ref Rng 1/10/2024  2:15 PM   Sodium      135 - 145 mmol/L 137    Potassium      3.4 - 5.3 mmol/L 4.7    Carbon Dioxide (CO2)      22 - 29 mmol/L 27    Anion Gap      7 - 15 mmol/L 9    Urea Nitrogen      8.0 - 23.0 mg/dL 15.0    Creatinine      0.51 - 0.95 mg/dL 0.83    GFR Estimate      >60 mL/min/1.73m2 72    Calcium      8.8 - 10.2 mg/dL 11.4 (H)    Chloride      98 - 107 mmol/L 101    Glucose      70 - 99 mg/dL 202 (H)    Alkaline Phosphatase      40 - 150 U/L 134    AST      0 - 45 U/L 28    ALT      0 - 50 U/L 28    Protein Total      6.4 - 8.3 g/dL 7.1    Albumin      3.5 - 5.2 g/dL 4.3    Bilirubin Total      <=1.2 mg/dL 0.5    Cholesterol      <200 mg/dL 281 (H)    Triglycerides      <150 mg/dL 105    HDL Cholesterol      >=50 mg/dL 76    LDL Cholesterol Calculated      <=100 mg/dL 184 (H)    Non HDL Cholesterol      <130 mg/dL 205 (H)    Patient Fasting? Yes    TSH      0.30 - 4.20 uIU/mL 9.50 (H)    Hemoglobin A1C      0.0 - 5.6 % 9.6 (H)    T4 Free      0.90 - 1.70 ng/dL 1.22         If you have any questions or concerns, please call me at the number listed here.       Sincerely,    EUSEBIO Bartlett  Patient Advocate Liason (PAL)  [facility]  Ph. [phone] / Fax. [phone]

## 2024-01-12 NOTE — TELEPHONE ENCOUNTER
"Called pt at [phone] & went over the lab results. Pt says that her calcium level is high most of the time because of her parathyroid issues. Also, she is not taking levothyroxine regularly.     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 She cooks & brings food to her sister everyday, takes her to all her appointments. Mostly up late nights taking care of her sister & sleeps during daytime(mostly till 3 or 4 pm). Doesn't make time for herself, doesn't take care of herself, not taking many of her meds on regular basis. Declines MTM support at this time.     Requesting me to send a rx for levothyroxine & metformin at this time. She doesn't want to go over her med list at this time. Supplementing many OTC meds for her condition such as cholesterol & osteopenia. Requesting to mail her lab results to her home address.     Scheduled lab-only on 1/18 & an OV with  on 1/31. Sent both meds to last till her upcoming appointment. Mailed her lab results to her home address.     FYI - pt doesn't have smart phone, computer or any other \"gadgets\".     Lab result notes by :  Please call the patient with the lab results      1. Your calcium numbers were slightly elevated. I'd like to repeat the labs to see if this was an error or if it is real. In addition I'll get some related labs. We should do this next week.     2. Your TSH (thyroid stimulating hormone) is slightly elevated, but the thyroid hormone (T4) is in the normal range. We should re-check your thyroid test in 2 months.     3. Your A1c was slightly up from previous. It is now 9.6. This is high and puts you at risk of problems with your kidneys, eyes, heart, and other organs. Is she taking her medications as prescribed? Is she willing to work with MTM to get better control?     4. Cholesterol is slightly increased compared to last year.     5. She is due for an office visit. Please " schedule.    EUSEBIO Bartlett  Patient Advocate Liason (PAL)  [facility]  Ph. [phone] / Fax. [phone]

## 2024-01-18 ENCOUNTER — LAB (OUTPATIENT)
Dept: LAB | Facility: CLINIC | Age: 79
End: 2024-01-18
Payer: COMMERCIAL

## 2024-01-18 DIAGNOSIS — E83.52 HYPERCALCEMIA: ICD-10-CM

## 2024-01-18 LAB — CA-I BLD-MCNC: 5.9 MG/DL (ref 4.4–5.2)

## 2024-01-18 PROCEDURE — 83970 ASSAY OF PARATHORMONE: CPT

## 2024-01-18 PROCEDURE — 82310 ASSAY OF CALCIUM: CPT

## 2024-01-18 PROCEDURE — 36415 COLL VENOUS BLD VENIPUNCTURE: CPT

## 2024-01-18 PROCEDURE — 82330 ASSAY OF CALCIUM: CPT

## 2024-01-19 LAB
CALCIUM SERPL-MCNC: 11.4 MG/DL (ref 8.8–10.2)
PTH-INTACT SERPL-MCNC: 39 PG/ML (ref 15–65)

## 2024-01-31 ENCOUNTER — OFFICE VISIT (OUTPATIENT)
Dept: PEDIATRICS | Facility: CLINIC | Age: 79
End: 2024-01-31
Payer: COMMERCIAL

## 2024-01-31 VITALS
WEIGHT: 159.5 LBS | SYSTOLIC BLOOD PRESSURE: 140 MMHG | OXYGEN SATURATION: 99 % | HEART RATE: 85 BPM | DIASTOLIC BLOOD PRESSURE: 70 MMHG | BODY MASS INDEX: 30.11 KG/M2 | RESPIRATION RATE: 20 BRPM | HEIGHT: 61 IN | TEMPERATURE: 97.5 F

## 2024-01-31 DIAGNOSIS — E03.9 HYPOTHYROIDISM, UNSPECIFIED TYPE: ICD-10-CM

## 2024-01-31 DIAGNOSIS — L30.4 INTERTRIGO: ICD-10-CM

## 2024-01-31 DIAGNOSIS — E21.3 HYPERPARATHYROIDISM (H): ICD-10-CM

## 2024-01-31 DIAGNOSIS — I10 ESSENTIAL HYPERTENSION: ICD-10-CM

## 2024-01-31 DIAGNOSIS — E11.65 TYPE 2 DIABETES MELLITUS WITH HYPERGLYCEMIA, WITHOUT LONG-TERM CURRENT USE OF INSULIN (H): Primary | ICD-10-CM

## 2024-01-31 DIAGNOSIS — E11.9 TYPE 2 DIABETES MELLITUS WITHOUT COMPLICATION, WITHOUT LONG-TERM CURRENT USE OF INSULIN (H): ICD-10-CM

## 2024-01-31 DIAGNOSIS — I25.10 ASCVD (ARTERIOSCLEROTIC CARDIOVASCULAR DISEASE): ICD-10-CM

## 2024-01-31 DIAGNOSIS — E83.52 HYPERCALCEMIA: ICD-10-CM

## 2024-01-31 PROCEDURE — 83970 ASSAY OF PARATHORMONE: CPT | Performed by: INTERNAL MEDICINE

## 2024-01-31 PROCEDURE — 82652 VIT D 1 25-DIHYDROXY: CPT | Mod: 59 | Performed by: INTERNAL MEDICINE

## 2024-01-31 PROCEDURE — 99000 SPECIMEN HANDLING OFFICE-LAB: CPT | Performed by: INTERNAL MEDICINE

## 2024-01-31 PROCEDURE — 99214 OFFICE O/P EST MOD 30 MIN: CPT | Performed by: INTERNAL MEDICINE

## 2024-01-31 PROCEDURE — 36415 COLL VENOUS BLD VENIPUNCTURE: CPT | Performed by: INTERNAL MEDICINE

## 2024-01-31 PROCEDURE — 82542 COL CHROMOTOGRAPHY QUAL/QUAN: CPT | Mod: 90 | Performed by: INTERNAL MEDICINE

## 2024-01-31 PROCEDURE — 84443 ASSAY THYROID STIM HORMONE: CPT | Performed by: INTERNAL MEDICINE

## 2024-01-31 PROCEDURE — 82652 VIT D 1 25-DIHYDROXY: CPT | Performed by: INTERNAL MEDICINE

## 2024-01-31 PROCEDURE — 80048 BASIC METABOLIC PNL TOTAL CA: CPT | Performed by: INTERNAL MEDICINE

## 2024-01-31 PROCEDURE — 82306 VITAMIN D 25 HYDROXY: CPT | Performed by: INTERNAL MEDICINE

## 2024-01-31 RX ORDER — METFORMIN HCL 500 MG
1000 TABLET, EXTENDED RELEASE 24 HR ORAL 2 TIMES DAILY WITH MEALS
Qty: 360 TABLET | Refills: 3 | Status: SHIPPED | OUTPATIENT
Start: 2024-01-31

## 2024-01-31 RX ORDER — LOSARTAN POTASSIUM 25 MG/1
25 TABLET ORAL DAILY
Qty: 90 TABLET | Refills: 3 | Status: SHIPPED | OUTPATIENT
Start: 2024-01-31

## 2024-01-31 RX ORDER — KETOCONAZOLE 20 MG/G
CREAM TOPICAL 2 TIMES DAILY
Qty: 60 G | Refills: 1 | Status: SHIPPED | OUTPATIENT
Start: 2024-01-31

## 2024-01-31 RX ORDER — NITROGLYCERIN 0.4 MG/1
0.4 TABLET SUBLINGUAL EVERY 5 MIN PRN
Qty: 25 TABLET | Refills: 1 | Status: SHIPPED | OUTPATIENT
Start: 2024-01-31

## 2024-01-31 RX ORDER — LEVOTHYROXINE SODIUM 112 UG/1
TABLET ORAL
Qty: 90 TABLET | Refills: 3 | Status: SHIPPED | OUTPATIENT
Start: 2024-01-31

## 2024-01-31 ASSESSMENT — ASTHMA QUESTIONNAIRES
QUESTION_1 LAST FOUR WEEKS HOW MUCH OF THE TIME DID YOUR ASTHMA KEEP YOU FROM GETTING AS MUCH DONE AT WORK, SCHOOL OR AT HOME: NONE OF THE TIME
QUESTION_4 LAST FOUR WEEKS HOW OFTEN HAVE YOU USED YOUR RESCUE INHALER OR NEBULIZER MEDICATION (SUCH AS ALBUTEROL): NOT AT ALL
QUESTION_5 LAST FOUR WEEKS HOW WOULD YOU RATE YOUR ASTHMA CONTROL: WELL CONTROLLED
QUESTION_3 LAST FOUR WEEKS HOW OFTEN DID YOUR ASTHMA SYMPTOMS (WHEEZING, COUGHING, SHORTNESS OF BREATH, CHEST TIGHTNESS OR PAIN) WAKE YOU UP AT NIGHT OR EARLIER THAN USUAL IN THE MORNING: ONCE OR TWICE
QUESTION_2 LAST FOUR WEEKS HOW OFTEN HAVE YOU HAD SHORTNESS OF BREATH: ONCE A DAY
ACT_TOTALSCORE: 20
ACT_TOTALSCORE: 20

## 2024-01-31 ASSESSMENT — PATIENT HEALTH QUESTIONNAIRE - PHQ9
10. IF YOU CHECKED OFF ANY PROBLEMS, HOW DIFFICULT HAVE THESE PROBLEMS MADE IT FOR YOU TO DO YOUR WORK, TAKE CARE OF THINGS AT HOME, OR GET ALONG WITH OTHER PEOPLE: SOMEWHAT DIFFICULT
SUM OF ALL RESPONSES TO PHQ QUESTIONS 1-9: 21
SUM OF ALL RESPONSES TO PHQ QUESTIONS 1-9: 21

## 2024-01-31 NOTE — PATIENT INSTRUCTIONS
I recommend starting losartan (cozaar) at 1/2 tab (or even 1/4 tab if you are more comfortable). You can take each dose for 2 weeks then increase with the goal of taking 1 tab daily.     For metformin, I would like you to take 2 tabs (1000 mg) of XR TWICE every day. You can use the regular metformin if needed.

## 2024-01-31 NOTE — PROGRESS NOTES
Assessment & Plan     (E11.65) Type 2 diabetes mellitus with hyperglycemia, without long-term current use of insulin (H)  (primary encounter diagnosis)  Comment: poorly controlled, recent A1c was 9.6. She takes medications based upon blood sugar checks rather than as scheduled. Discussed switching to SB5 and involving MTM for co-visits and she agrees.       (E21.3) Hyperparathyroidism (H24)  Comment: with hypercalcemia, needs further lab workup.      (E83.52) Hypercalcemia  Comment: asymptomatic, needs further lab   Plan: PTH Related Peptide Test, Vitamin D Deficiency,        1,25 Dihydroxyvitamin D, Parathyroid Hormone         Intact, Basic metabolic panel  (Ca, Cl, CO2,         Creat, Gluc, K, Na, BUN), Primary Care - Care         Coordination Referral, PTH Related Peptide         Test, Ionized Calcium, 1,25 Dihydroxyvitamin D,        CBC with platelets and differential            (I10) Essential hypertension  Comment: Near goal. She has not been taking losartan as she was worried about side effects. Again discussed in depth that this is a safe medication and she can start slowly if needed. Can start with 1/2 tab, she prefers to start with 1/4 tab at a time.   Plan: losartan (COZAAR) 25 MG tablet            (I25.10) ASCVD (arteriosclerotic cardiovascular disease)  Comment: stable. Has never needed nitroglycerin  Plan: nitroGLYcerin (NITROSTAT) 0.4 MG sublingual         tablet            (E11.9) Type 2 diabetes mellitus without complication, without long-term current use of insulin (H)  Comment: as above, not at goal.   Plan: metFORMIN (GLUCOPHAGE) 500 MG tablet, metFORMIN        (GLUCOPHAGE XR) 500 MG 24 hr tablet,         ketoconazole (NIZORAL) 2 % external cream            (E03.9) Hypothyroidism, unspecified type  Comment: due for labs, refill.   Plan: levothyroxine (SYNTHROID/LEVOTHROID) 112 MCG         tablet, TSH with free T4 reflex            (L30.4) Intertrigo  Comment: refill provided  Plan: ketoconazole  "(NIZORAL) 2 % external cream                    BMI  Estimated body mass index is 30.14 kg/m  as calculated from the following:    Height as of this encounter: 1.549 m (5' 1\").    Weight as of this encounter: 72.3 kg (159 lb 8 oz).   Weight management plan: Discussed healthy diet and exercise guidelines    Depression Screening Follow Up        1/31/2024     4:49 PM   PHQ   PHQ-9 Total Score 21   Q9: Thoughts of better off dead/self-harm past 2 weeks Not at all         Follow Up Actions Taken  Patient counseled, no additional follow up at this time.           Marcela Robles is a 78 year old, presenting for the following health issues:  Recheck Medication        1/31/2024     4:51 PM   Additional Questions   Roomed by Emmy   Accompanied by none         1/31/2024     4:51 PM   Patient Reported Additional Medications   Patient reports taking the following new medications none     HPI       Diabetes Follow-up    How often are you checking your blood sugar? Not at all  What concerns do you have today about your diabetes? None   Do you have any of these symptoms? (Select all that apply)  Blurry vision  Have you had a diabetic eye exam in the last 12 months? No    Night incontinence - daytime urge incontinence.     Stopped the statins two years ago due to muscle soreness in the arms. Tried atorvastatin, pravastatin. Not interested in     Osteoporosis - she is fearful of alendronate, does not want to take it.       BP Readings from Last 2 Encounters:   01/31/24 (!) 140/70   12/09/22 (!) 148/68     Hemoglobin A1C (%)   Date Value   01/10/2024 9.6 (H)   12/09/2022 9.0 (H)   01/15/2021 8.7 (H)   03/10/2020 8.1 (H)     LDL Cholesterol Calculated (mg/dL)   Date Value   01/10/2024 184 (H)   06/17/2022 200 (H)   01/15/2021 209 (H)   07/23/2019 114 (H)             Asthma Follow-Up    Was ACT completed today?  Yes        1/31/2024     4:55 PM   ACT Total Scores   ACT TOTAL SCORE (Goal Greater than or Equal to 20) 20   In the " "past 12 months, how many times did you visit the emergency room for your asthma without being admitted to the hospital? 0   In the past 12 months, how many times were you hospitalized overnight because of your asthma? 0        How many days per week do you miss taking your asthma controller medication?  I do not have an asthma controller medication  Please describe any recent triggers for your asthma: Patient is unaware of triggers  Have you had any Emergency Room Visits, Urgent Care Visits, or Hospital Admissions since your last office visit?  No  Hypothyroidism Follow-up    Since last visit, patient describes the following symptoms: constipation, depression, and fatigue            Objective    BP (!) 140/70 (BP Location: Right arm, Patient Position: Sitting, Cuff Size: Adult Regular)   Pulse 85   Temp 97.5  F (36.4  C) (Tympanic)   Resp 20   Ht 1.549 m (5' 1\")   Wt 72.3 kg (159 lb 8 oz)   LMP  (LMP Unknown)   SpO2 99%   BMI 30.14 kg/m    Body mass index is 30.14 kg/m .  Physical Exam   GENERAL: alert and no distress  EYES: Eyes grossly normal to inspection, PERRL and conjunctivae and sclerae normal  MS: no gross musculoskeletal defects noted, no edema  SKIN: no suspicious lesions or rashes  NEURO: Normal strength and tone, mentation intact and speech normal  PSYCH: mentation appears normal, tangential, and judgement and insight impaired            Signed Electronically by: Zaina Ramirez MD    "

## 2024-01-31 NOTE — LETTER
February 2, 2024      Margaret Ramos  [address] [place] [address]           Dear Margaret,     It was nice to see you yesterday! Here are your lab results. A couple of things:     1. Your parathyroid number is normal, but the calcium level continues to go up.  We should check a couple of other labs to see why this is, so I'd like you to come in for those labs.     2. Your blood sugar was 206, this is higher than it should be.     3. Your other kidney and electrolyte tests were normal.        Please feel free to contact us with any questions or concerns!  Sincerely,  Zaina Ramirez MD  Internal Medicine - Pediatrics    Resulted Orders   TSH with free T4 reflex   Result Value Ref Range    TSH 3.30 0.30 - 4.20 uIU/mL   Vitamin D Deficiency   Result Value Ref Range    Vitamin D, Total (25-Hydroxy) 56 (H) 20 - 50 ng/mL      Comment:      indicates supplementation, with increased risk of hypercalciuria    Narrative    Season, race, dietary intake, and treatment affect the concentration of 25-hydroxy-Vitamin D. Values may decrease during winter months and increase during summer months.    Vitamin D determination is routinely performed by an immunoassay specific for 25 hydroxyvitamin D3.  If an individual is on vitamin D2(ergocalciferol) supplementation, please specify 25 OH vitamin D2 and D3 level determination by LCMSMS test VITD23.     Parathyroid Hormone Intact   Result Value Ref Range    Parathyroid Hormone Intact 45 15 - 65 pg/mL    Narrative    This result was obtained with the Roche Elecsys PTH STAT assay.   This reference range differs from PTH assays used in other [facility] laboratories.   Basic metabolic panel  (Ca, Cl, CO2, Creat, Gluc, K, Na, BUN)   Result Value Ref Range    Sodium 136 135 - 145 mmol/L      Comment:      Reference intervals for this test were updated on 09/26/2023 to more accurately reflect our healthy population. There may be differences in the flagging of prior results with  similar values performed with this method. Interpretation of those prior results can be made in the context of the updated reference intervals.     Potassium 4.6 3.4 - 5.3 mmol/L    Chloride 99 98 - 107 mmol/L    Carbon Dioxide (CO2) 27 22 - 29 mmol/L    Anion Gap 10 7 - 15 mmol/L    Urea Nitrogen 16.4 8.0 - 23.0 mg/dL    Creatinine 0.72 0.51 - 0.95 mg/dL    GFR Estimate 85 >60 mL/min/1.73m2    Calcium 11.8 (H) 8.8 - 10.2 mg/dL    Glucose 206 (H) 70 - 99 mg/dL

## 2024-02-01 ENCOUNTER — PATIENT OUTREACH (OUTPATIENT)
Dept: CARE COORDINATION | Facility: CLINIC | Age: 79
End: 2024-02-01
Payer: COMMERCIAL

## 2024-02-01 LAB
ANION GAP SERPL CALCULATED.3IONS-SCNC: 10 MMOL/L (ref 7–15)
BUN SERPL-MCNC: 16.4 MG/DL (ref 8–23)
CALCIUM SERPL-MCNC: 11.8 MG/DL (ref 8.8–10.2)
CHLORIDE SERPL-SCNC: 99 MMOL/L (ref 98–107)
CREAT SERPL-MCNC: 0.72 MG/DL (ref 0.51–0.95)
DEPRECATED HCO3 PLAS-SCNC: 27 MMOL/L (ref 22–29)
EGFRCR SERPLBLD CKD-EPI 2021: 85 ML/MIN/1.73M2
GLUCOSE SERPL-MCNC: 206 MG/DL (ref 70–99)
POTASSIUM SERPL-SCNC: 4.6 MMOL/L (ref 3.4–5.3)
PTH-INTACT SERPL-MCNC: 45 PG/ML (ref 15–65)
SODIUM SERPL-SCNC: 136 MMOL/L (ref 135–145)
TSH SERPL DL<=0.005 MIU/L-ACNC: 3.3 UIU/ML (ref 0.3–4.2)
VIT D+METAB SERPL-MCNC: 56 NG/ML (ref 20–50)

## 2024-02-01 NOTE — LETTER
[facility] CARE COORDINATION  [address]  [place] [address]    February 2, 2024    Margaret Ramos  [address] [place] [address]      Dear Margaret,    I am a clinic community health worker who works with Zaina Ramirez MD with the [facility]. I have been trying to reach you recently to introduce Clinic Care Coordination. Below is a description of clinic care coordination and how I can further assist you.       The clinic care coordination team is made up of a registered nurse, , financial resource worker and community health worker who understand the health care system. The goal of clinic care coordination is to help you manage your health and improve access to the health care system. Our team works alongside your provider to assist you in determining your health and social needs. We can help you obtain health care and community resources, providing you with necessary information and education. We can work with you through any barriers and develop a care plan that helps coordinate and strengthen the communication between you and your care team.  Our services are voluntary and are offered without charge to you personally.    Please feel free to contact me with any questions or concerns regarding care coordination and what we can offer.      We are focused on providing you with the highest-quality healthcare experience possible.    Sincerely,     Esperanza Khan, MARGOT, B.S. Public Health  Clinic Care Coordination  [facility]:  [place], [place] and [place]  [phone]  [email or web address]

## 2024-02-01 NOTE — PROGRESS NOTES
Clinic Care Coordination Contact  [facility]    Clinical Data: Care Coordinator Outreach    Outreach Documentation Number of Outreach Attempt   2/1/2024  10:10 AM 1     Phone number not available.     Plan: Care Coordinator will try to reach patient again in 1-2 business days.    Esperanza Khan, MARGOT, B.S. [facility] Care Coordination  [facility]:  [place], [place] and [place]  [phone]  [email or web address]

## 2024-02-02 NOTE — PROGRESS NOTES
Clinic Care Coordination Contact  [facility]/Voicemail    Clinical Data: Care Coordinator Outreach    Outreach Documentation Number of Outreach Attempt   2/1/2024  10:10 AM 1   2/2/2024   2:05 PM 2       Left message on patient's voicemail with call back information and requested return call.    Plan: Care Coordinator will send care coordination introduction letter with care coordinator contact information and explanation of care coordination services via mail. Care Coordinator will do no further outreaches at this time.    Esperanza Khan, MARGOT, B.S. [facility] Care Coordination  [facility]:  [place], [place] and [place]  [phone]  [email or web address]

## 2024-02-06 DIAGNOSIS — E11.9 TYPE 2 DIABETES MELLITUS WITHOUT COMPLICATION, WITHOUT LONG-TERM CURRENT USE OF INSULIN (H): ICD-10-CM

## 2024-02-06 RX ORDER — BLOOD SUGAR DIAGNOSTIC
STRIP MISCELLANEOUS
Qty: 400 STRIP | Refills: 4 | Status: SHIPPED | OUTPATIENT
Start: 2024-02-06

## 2024-02-07 LAB
1,25(OH)2D SERPL-MCNC: 74.4 PG/ML (ref 19.9–79.3)
1,25(OH)2D SERPL-MCNC: 76.6 PG/ML (ref 19.9–79.3)
PTH RELATED PROT SERPL-SCNC: 3.4 PMOL/L

## 2024-02-09 DIAGNOSIS — E83.52 HYPERCALCEMIA: Primary | ICD-10-CM

## 2024-04-25 ENCOUNTER — TRANSFERRED RECORDS (OUTPATIENT)
Dept: HEALTH INFORMATION MANAGEMENT | Facility: CLINIC | Age: 79
End: 2024-04-25
Payer: COMMERCIAL

## 2025-02-28 DIAGNOSIS — E03.9 HYPOTHYROIDISM, UNSPECIFIED TYPE: ICD-10-CM

## 2025-03-03 DIAGNOSIS — E03.9 HYPOTHYROIDISM, UNSPECIFIED TYPE: ICD-10-CM

## 2025-03-03 RX ORDER — LEVOTHYROXINE SODIUM 112 UG/1
TABLET ORAL
Qty: 30 TABLET | Refills: 0 | OUTPATIENT
Start: 2025-03-03

## 2025-03-03 RX ORDER — LEVOTHYROXINE SODIUM 112 UG/1
TABLET ORAL
Qty: 30 TABLET | Refills: 0 | Status: SHIPPED | OUTPATIENT
Start: 2025-03-03

## 2025-05-27 ENCOUNTER — OFFICE VISIT (OUTPATIENT)
Dept: PEDIATRICS | Facility: CLINIC | Age: 80
End: 2025-05-27
Payer: COMMERCIAL

## 2025-05-27 VITALS
BODY MASS INDEX: 29.09 KG/M2 | SYSTOLIC BLOOD PRESSURE: 189 MMHG | RESPIRATION RATE: 20 BRPM | HEART RATE: 79 BPM | HEIGHT: 61 IN | DIASTOLIC BLOOD PRESSURE: 84 MMHG | WEIGHT: 154.1 LBS | OXYGEN SATURATION: 100 % | TEMPERATURE: 98.6 F

## 2025-05-27 DIAGNOSIS — R03.0 ELEVATED BLOOD-PRESSURE READING, WITHOUT DIAGNOSIS OF HYPERTENSION: ICD-10-CM

## 2025-05-27 DIAGNOSIS — N39.46 MIXED INCONTINENCE: ICD-10-CM

## 2025-05-27 DIAGNOSIS — E83.52 HYPERCALCEMIA: ICD-10-CM

## 2025-05-27 DIAGNOSIS — E78.2 MIXED HYPERLIPIDEMIA: ICD-10-CM

## 2025-05-27 DIAGNOSIS — I10 ESSENTIAL HYPERTENSION: ICD-10-CM

## 2025-05-27 DIAGNOSIS — E03.8 OTHER SPECIFIED HYPOTHYROIDISM: ICD-10-CM

## 2025-05-27 DIAGNOSIS — E21.0 PRIMARY HYPERPARATHYROIDISM: ICD-10-CM

## 2025-05-27 DIAGNOSIS — Z13.9 ENCOUNTER FOR SCREENING INVOLVING SOCIAL DETERMINANTS OF HEALTH (SDOH): ICD-10-CM

## 2025-05-27 DIAGNOSIS — J45.20 MILD INTERMITTENT ASTHMA, UNSPECIFIED WHETHER COMPLICATED: ICD-10-CM

## 2025-05-27 DIAGNOSIS — I25.10 ASCVD (ARTERIOSCLEROTIC CARDIOVASCULAR DISEASE): ICD-10-CM

## 2025-05-27 DIAGNOSIS — Z00.00 ENCOUNTER FOR MEDICARE ANNUAL WELLNESS EXAM: Primary | ICD-10-CM

## 2025-05-27 DIAGNOSIS — E11.9 TYPE 2 DIABETES MELLITUS WITHOUT COMPLICATION, WITHOUT LONG-TERM CURRENT USE OF INSULIN (H): ICD-10-CM

## 2025-05-27 LAB
CA-I BLD-MCNC: 5.5 MG/DL (ref 4.4–5.2)
EST. AVERAGE GLUCOSE BLD GHB EST-MCNC: 197 MG/DL
HBA1C MFR BLD: 8.5 % (ref 0–5.6)

## 2025-05-27 PROCEDURE — 36415 COLL VENOUS BLD VENIPUNCTURE: CPT | Performed by: INTERNAL MEDICINE

## 2025-05-27 PROCEDURE — 83036 HEMOGLOBIN GLYCOSYLATED A1C: CPT | Performed by: INTERNAL MEDICINE

## 2025-05-27 PROCEDURE — 82330 ASSAY OF CALCIUM: CPT | Performed by: INTERNAL MEDICINE

## 2025-05-27 RX ORDER — ALBUTEROL SULFATE 0.83 MG/ML
2.5 SOLUTION RESPIRATORY (INHALATION) EVERY 6 HOURS PRN
Qty: 360 ML | Refills: 3 | Status: SHIPPED | OUTPATIENT
Start: 2025-05-27

## 2025-05-27 RX ORDER — METFORMIN HYDROCHLORIDE 500 MG/1
1000 TABLET, EXTENDED RELEASE ORAL 2 TIMES DAILY WITH MEALS
Qty: 360 TABLET | Refills: 3 | Status: SHIPPED | OUTPATIENT
Start: 2025-05-27

## 2025-05-27 RX ORDER — LANCETS
EACH MISCELLANEOUS
Qty: 300 EACH | Refills: 11 | Status: SHIPPED | OUTPATIENT
Start: 2025-05-27

## 2025-05-27 RX ORDER — BLOOD SUGAR DIAGNOSTIC
STRIP MISCELLANEOUS
Qty: 400 STRIP | Refills: 4 | Status: SHIPPED | OUTPATIENT
Start: 2025-05-27

## 2025-05-27 RX ORDER — LEVOTHYROXINE SODIUM 112 UG/1
TABLET ORAL
Qty: 30 TABLET | Refills: 0 | Status: SHIPPED | OUTPATIENT
Start: 2025-05-27 | End: 2025-05-27

## 2025-05-27 RX ORDER — LOSARTAN POTASSIUM 25 MG/1
25 TABLET ORAL DAILY
Qty: 90 TABLET | Refills: 3 | Status: SHIPPED | OUTPATIENT
Start: 2025-05-27

## 2025-05-27 RX ORDER — NITROGLYCERIN 0.4 MG/1
0.4 TABLET SUBLINGUAL EVERY 5 MIN PRN
Qty: 25 TABLET | Refills: 1 | Status: SHIPPED | OUTPATIENT
Start: 2025-05-27

## 2025-05-27 RX ORDER — LEVOTHYROXINE SODIUM 112 UG/1
TABLET ORAL
Qty: 90 TABLET | Refills: 3 | Status: SHIPPED | OUTPATIENT
Start: 2025-05-27

## 2025-05-27 SDOH — HEALTH STABILITY: PHYSICAL HEALTH: ON AVERAGE, HOW MANY DAYS PER WEEK DO YOU ENGAGE IN MODERATE TO STRENUOUS EXERCISE (LIKE A BRISK WALK)?: 0 DAYS

## 2025-05-27 ASSESSMENT — ASTHMA QUESTIONNAIRES
QUESTION_4 LAST FOUR WEEKS HOW OFTEN HAVE YOU USED YOUR RESCUE INHALER OR NEBULIZER MEDICATION (SUCH AS ALBUTEROL): NOT AT ALL
ACT_TOTALSCORE: 22
QUESTION_3 LAST FOUR WEEKS HOW OFTEN DID YOUR ASTHMA SYMPTOMS (WHEEZING, COUGHING, SHORTNESS OF BREATH, CHEST TIGHTNESS OR PAIN) WAKE YOU UP AT NIGHT OR EARLIER THAN USUAL IN THE MORNING: NOT AT ALL
QUESTION_2 LAST FOUR WEEKS HOW OFTEN HAVE YOU HAD SHORTNESS OF BREATH: ONCE OR TWICE A WEEK
QUESTION_1 LAST FOUR WEEKS HOW MUCH OF THE TIME DID YOUR ASTHMA KEEP YOU FROM GETTING AS MUCH DONE AT WORK, SCHOOL OR AT HOME: NONE OF THE TIME
QUESTION_5 LAST FOUR WEEKS HOW WOULD YOU RATE YOUR ASTHMA CONTROL: SOMEWHAT CONTROLLED

## 2025-05-27 ASSESSMENT — SOCIAL DETERMINANTS OF HEALTH (SDOH): HOW OFTEN DO YOU GET TOGETHER WITH FRIENDS OR RELATIVES?: NEVER

## 2025-05-27 ASSESSMENT — PAIN SCALES - GENERAL: PAINLEVEL_OUTOF10: NO PAIN (0)

## 2025-05-27 NOTE — PATIENT INSTRUCTIONS
Please do two things for me:   Pelvic PT   24 hour blood pressure monitor.     Follow up with me in 3 months.     Care coordination will call you to talk about transportation help.     I think you should get a tetanus vaccine in a pharmacy.       Patient Education   Preventive Care Advice   This is general advice given by our system to help you stay healthy. However, your care team may have specific advice just for you. Please talk to your care team about your preventive care needs.  Nutrition  Eat 5 or more servings of fruits and vegetables each day.  Try wheat bread, brown rice and whole grain pasta (instead of white bread, rice, and pasta).  Get enough calcium and vitamin D. Check the label on foods and aim for 100% of the RDA (recommended daily allowance).  Lifestyle  Exercise at least 150 minutes each week  (30 minutes a day, 5 days a week).  Do muscle strengthening activities 2 days a week. These help control your weight and prevent disease.  No smoking.  Wear sunscreen to prevent skin cancer.  Have a dental exam and cleaning every 6 months.  Yearly exams  See your health care team every year to talk about:  Any changes in your health.  Any medicines your care team has prescribed.  Preventive care, family planning, and ways to prevent chronic diseases.  Shots (vaccines)   HPV shots (up to age 26), if you've never had them before.  Hepatitis B shots (up to age 59), if you've never had them before.  COVID-19 shot: Get this shot when it's due.  Flu shot: Get a flu shot every year.  Tetanus shot: Get a tetanus shot every 10 years.  Pneumococcal, hepatitis A, and RSV shots: Ask your care team if you need these based on your risk.  Shingles shot (for age 50 and up)  General health tests  Diabetes screening:  Starting at age 35, Get screened for diabetes at least every 3 years.  If you are younger than age 35, ask your care team if you should be screened for diabetes.  Cholesterol test: At age 39, start having a  cholesterol test every 5 years, or more often if advised.  Bone density scan (DEXA): At age 50, ask your care team if you should have this scan for osteoporosis (brittle bones).  Hepatitis C: Get tested at least once in your life.  STIs (sexually transmitted infections)  Before age 24: Ask your care team if you should be screened for STIs.  After age 24: Get screened for STIs if you're at risk. You are at risk for STIs (including HIV) if:  You are sexually active with more than one person.  You don't use condoms every time.  You or a partner was diagnosed with a sexually transmitted infection.  If you are at risk for HIV, ask about PrEP medicine to prevent HIV.  Get tested for HIV at least once in your life, whether you are at risk for HIV or not.  Cancer screening tests  Cervical cancer screening: If you have a cervix, begin getting regular cervical cancer screening tests starting at age 21.  Breast cancer scan (mammogram): If you've ever had breasts, begin having regular mammograms starting at age 40. This is a scan to check for breast cancer.  Colon cancer screening: It is important to start screening for colon cancer at age 45.  Have a colonoscopy test every 10 years (or more often if you're at risk) Or, ask your provider about stool tests like a FIT test every year or Cologuard test every 3 years.  To learn more about your testing options, visit:   .  For help making a decision, visit:   https://bit.ly/il98012.  Prostate cancer screening test: If you have a prostate, ask your care team if a prostate cancer screening test (PSA) at age 55 is right for you.  Lung cancer screening: If you are a current or former smoker ages 50 to 80, ask your care team if ongoing lung cancer screenings are right for you.  For informational purposes only. Not to replace the advice of your health care provider. Copyright   2023 [place] [employer]. All rights reserved. Clinically reviewed by the [facility] Transitions  Program. [employer] 238734 - REV 01/24.  Learning About Activities of Daily Living  What are activities of daily living?     Activities of daily living (ADLs) are the basic self-care tasks you do every day. These include eating, bathing, dressing, and moving around.  As you age, and if you have health problems, you may find that it's harder to do some of these tasks. If so, your doctor can suggest ideas that may help.  To measure what kind of help you may need, your doctor will ask how well you are able to do ADLs. Let your doctor know if there are any tasks that you are having trouble doing. This is an important first step to getting help. And when you have the help you need, you can stay as independent as possible.  How will a doctor assess your ADLs?  Asking about ADLs is part of a routine health checkup your doctor will likely do as you age. Your health check might be done in a doctor's office, in your home, or at a hospital. The goal is to find out if you are having any problems that could make it hard to care for yourself or that make it unsafe for you to be on your own.  To measure your ADLs, your doctor will ask how hard it is for you to do routine tasks. Your doctor may also want to know if you have changed the way you do a task because of a health problem. Your doctor may watch how you:  Walk back and forth.  Keep your balance while you stand or walk.  Move from sitting to standing or from a bed to a chair.  Button or unbutton a shirt or sweater.  Remove and put on your shoes.  It's common to feel a little worried or anxious if you find you can't do all the things you used to be able to do. Talking with your doctor about ADLs is a way to make sure you're as safe as possible and able to care for yourself as well as you can. You may want to bring a caregiver, friend, or family member to your checkup. They can help you talk to your doctor.  Follow-up care is a key part of your treatment and safety. Be sure to  make and go to all appointments, and call your doctor if you are having problems. It's also a good idea to know your test results and keep a list of the medicines you take.  Current as of: October 24, 2024  Content Version: 14.4    7090-0939 [employer].   Care instructions adapted under license by your healthcare professional. If you have questions about a medical condition or this instruction, always ask your healthcare professional. [employer] disclaims any warranty or liability for your use of this information.    Preventing Falls: Care Instructions  Injuries and health problems such as trouble walking or poor eyesight can increase your risk of falling. So can some medicines. But there are things you can do to help prevent falls. You can exercise to get stronger. You can also arrange your home to make it safer.    Talk to your doctor about the medicines you take. Ask if any of them increase the risk of falls and whether they can be changed or stopped.   Try to exercise regularly. It can help improve your strength and balance. This can help lower your risk of falling.         Practice fall safety and prevention.   Wear low-heeled shoes that fit well and give your feet good support. Talk to your doctor if you have foot problems that make this hard.  Carry a cellphone or wear a medical alert device that you can use to call for help.  Use stepladders instead of chairs to reach high objects. Don't climb if you're at risk for falls. Ask for help, if needed.  Wear the correct eyeglasses, if you need them.        Make your home safer.   Remove rugs, cords, clutter, and furniture from walkways.  Keep your house well lit. Use night-lights in hallways and bathrooms.  Install and use sturdy handrails on stairways.  Wear nonskid footwear, even inside. Don't walk barefoot or in socks without shoes.        Be safe outside.   Use handrails, curb cuts, and ramps whenever possible.  Keep your hands free by  "using a shoulder bag or backpack.  Try to walk in well-lit areas. Watch out for uneven ground, changes in pavement, and debris.  Be careful in the winter. Walk on the grass or gravel when sidewalks are slippery. Use de-icer on steps and walkways. Add non-slip devices to shoes.    Put grab bars and nonskid mats in your shower or tub and near the toilet. Try to use a shower chair or bath bench when bathing.   Get into a tub or shower by putting in your weaker leg first. Get out with your strong side first. Have a phone or medical alert device in the bathroom with you.   Where can you learn more?  Go to https://www.[employer]/patiented  Enter G117 in the search box to learn more about \"Preventing Falls: Care Instructions.\"  Current as of: July 31, 2024  Content Version: 14.4    1791-3587 [employer].   Care instructions adapted under license by your healthcare professional. If you have questions about a medical condition or this instruction, always ask your healthcare professional. [employer] disclaims any warranty or liability for your use of this information.    Hearing Loss: Care Instructions  Overview     Hearing loss is a sudden or slow decrease in how well you hear. It can range from slight to profound. Permanent hearing loss can occur with aging. It also can happen when you are exposed long-term to loud noise. Examples include listening to loud music, riding motorcycles, or being around other loud machines.  Hearing loss can affect your work and home life. It can make you feel lonely or depressed. You may feel that you have lost your independence. But hearing aids and other devices can help you hear better and feel connected to others.  Follow-up care is a key part of your treatment and safety. Be sure to make and go to all appointments, and call your doctor if you are having problems. It's also a good idea to know your test results and keep a list of the medicines you take.  How can " you care for yourself at home?  Avoid loud noises whenever possible. This helps keep your hearing from getting worse.  Always wear hearing protection around loud noises.  Wear a hearing aid as directed.  A professional can help you pick a hearing aid that will work best for you.  You can also get hearing aids over the counter for mild to moderate hearing loss.  Have hearing tests as your doctor suggests. They can show whether your hearing has changed. Your hearing aid may need to be adjusted.  Use other devices as needed. These may include:  Telephone amplifiers and hearing aids that can connect to a television, stereo, radio, or microphone.  Devices that use lights or vibrations. These alert you to the doorbell, a ringing telephone, or a baby monitor.  Television closed-captioning. This shows the words at the bottom of the screen. Most new TVs can do this.  TTY (text telephone). This lets you type messages back and forth on the telephone instead of talking or listening. These devices are also called TDD. When messages are typed on the keyboard, they are sent over the phone line to a receiving TTY. The message is shown on a monitor.  Use text messaging, social media, and email if it is hard for you to communicate by telephone.  Try to learn a listening technique called speechreading. It is not lipreading. You pay attention to people's gestures, expressions, posture, and tone of voice. These clues can help you understand what a person is saying. Face the person you are talking to, and have them face you. Make sure the lighting is good. You need to see the other person's face clearly.  Think about counseling if you need help to adjust to your hearing loss.  When should you call for help?  Watch closely for changes in your health, and be sure to contact your doctor if:    You think your hearing is getting worse.     You have new symptoms, such as dizziness or nausea.   Where can you learn more?  Go to  "https://www.[employer].net/patiented  Enter R798 in the search box to learn more about \"Hearing Loss: Care Instructions.\"  Current as of: October 27, 2024  Content Version: 14.4 2024-2025 [employer].   Care instructions adapted under license by your healthcare professional. If you have questions about a medical condition or this instruction, always ask your healthcare professional. [employer] disclaims any warranty or liability for your use of this information.    Relationships for Good Health  Relationships are important for our health and happiness. Social isolation, loneliness and lack of support are bad for your health. Studies show that loneliness can harm health and limit your life span as much as high blood pressure and smoking.   Take some time to reflect on your relationships. Then answer these questions:  Are there people in your life that cause you stress or drain your energy? What can you do to set limits?  ________________________________________________________________________________________________________________________________________________________________________________________________________________________________________________________________________________________________________________________________________________  Who do you enjoy spending time with? Who can you go to for support?  ________________________________________________________________________________________________________________________________________________________________________________________________________________________________________________________________________________________________________________________________________________  What can you do to improve your relationships with " others?  __________________________________________________________________________________________________________________________________________________________________________________________________________________  ______________________________________________________________________________________________________________________________  What do you like most about your relationships with others?  ________________________________________________________________________________________________________________________________________________________________________________________________________________________________________________________________________________________________________________________________________________  My goal: ______________________________________________________________________  I will: ______________________________________________________________________________________________________________________________________________________________________________________________    For informational purposes only. Not to replace the advice of your health care provider. Copyright   2018 [facility]. All rights reserved. Clinically reviewed by [facility] Health  Team. [employer] 332515 - Rev 06/24.  Learning About Sleeping Well  What does sleeping well mean?     Sleeping well means getting enough sleep to feel good and stay healthy. How much sleep is enough varies among people.  The number of hours you sleep and how you feel when you wake up are both important. If you do not feel refreshed, you probably need more sleep. Another sign of not getting enough sleep is feeling tired during the day.  Experts recommend that adults get at least 7 or more hours of sleep per day. Children and older adults need more sleep.  Why is getting enough sleep important?  Getting enough quality sleep is a basic part of good health. When your sleep suffers, your physical health, mood, and  "your thoughts can suffer too. You may find yourself feeling more grumpy or stressed. Not getting enough sleep also can lead to serious problems, including injury, accidents, anxiety, and depression.  What might cause poor sleeping?  Many things can cause sleep problems, including:  Changes to your sleep schedule.  Stress. Stress can be caused by fear about a single event, such as giving a speech. Or you may have ongoing stress, such as worry about work or school.  Depression, anxiety, and other mental or emotional conditions.  Changes in your sleep habits or surroundings. This includes changes that happen where you sleep, such as noise, light, or sleeping in a different bed. It also includes changes in your sleep pattern, such as having jet lag or working a late shift.  Health problems, such as pain, breathing problems, and restless legs syndrome.  Lack of regular exercise.  Using alcohol, nicotine, or caffeine before bed.  How can you help yourself?  Here are some tips that may help you sleep more soundly and wake up feeling more refreshed.  Your sleeping area   Use your bedroom only for sleeping and sex. A bit of light reading may help you fall asleep. But if it doesn't, do your reading elsewhere in the house. Try not to use your TV, computer, smartphone, or tablet while you are in bed.  Be sure your bed is big enough to stretch out comfortably, especially if you have a sleep partner.  Keep your bedroom quiet, dark, and cool. Use curtains, blinds, or a sleep mask to block out light. To block out noise, use earplugs, soothing music, or a \"white noise\" machine.  Your evening and bedtime routine   Create a relaxing bedtime routine. You might want to take a warm shower or bath, or listen to soothing music.  Go to bed at the same time every night. And get up at the same time every morning, even if you feel tired.  What to avoid   Limit caffeine (coffee, tea, caffeinated sodas) during the day, and don't have any for at " "least 6 hours before bedtime.  Avoid drinking alcohol before bedtime. Alcohol can cause you to wake up more often during the night.  Try not to smoke or use tobacco, especially in the evening. Nicotine can keep you awake.  Limit naps during the day, especially close to bedtime.  Avoid lying in bed awake for too long. If you can't fall asleep or if you wake up in the middle of the night and can't get back to sleep within about 20 minutes, get out of bed and go to another room until you feel sleepy.  Avoid taking medicine right before bed that may keep you awake or make you feel hyper or energized. Your doctor can tell you if your medicine may do this and if you can take it earlier in the day.  If you can't sleep   Imagine yourself in a peaceful, pleasant scene. Focus on the details and feelings of being in a place that is relaxing.  Get up and do a quiet or boring activity until you feel sleepy.  Avoid drinking any liquids before going to bed to help prevent waking up often to use the bathroom.  Where can you learn more?  Go to https://www.[employer].net/patiented  Enter J942 in the search box to learn more about \"Learning About Sleeping Well.\"  Current as of: July 31, 2024  Content Version: 14.4 2024-2025 [employer].   Care instructions adapted under license by your healthcare professional. If you have questions about a medical condition or this instruction, always ask your healthcare professional. [employer] disclaims any warranty or liability for your use of this information.    Bladder Training: Care Instructions  Your Care Instructions     Bladder training is used to treat urge incontinence and stress incontinence. Urge incontinence means that the need to urinate comes on so fast that you can't get to a toilet in time. Stress incontinence means that you leak urine because of pressure on your bladder. For example, it may happen when you laugh, cough, or lift something heavy.  Bladder " training can increase how long you can wait before you have to urinate. It can also help your bladder hold more urine. And it can give you better control over the urge to urinate.  It is important to remember that bladder training takes a few weeks to a few months to make a difference. You may not see results right away, but don't give up.  Follow-up care is a key part of your treatment and safety. Be sure to make and go to all appointments, and call your doctor if you are having problems. It's also a good idea to know your test results and keep a list of the medicines you take.  How can you care for yourself at home?  Work with your doctor to come up with a bladder training program that is right for you. You may use one or more of the following methods.  Delayed urination  In the beginning, try to keep from urinating for 5 minutes after you first feel the need to go.  While you wait, take deep, slow breaths to relax. Kegel exercises can also help you delay the need to go to the bathroom.  After some practice, when you can easily wait 5 minutes to urinate, try to wait 10 minutes before you urinate.  Slowly increase the waiting period until you are able to control when you have to urinate.  Scheduled urination  Empty your bladder when you first wake up in the morning.  Schedule times throughout the day when you will urinate.  Start by going to the bathroom every hour, even if you don't need to go.  Slowly increase the time between trips to the bathroom.  When you have found a schedule that works well for you, keep doing it.  If you wake up during the night and have to urinate, do it. Apply your schedule to waking hours only.  Kegel exercises  These tighten and strengthen pelvic muscles, which can help you control the flow of urine. (If doing these exercises causes pain, stop doing them and talk with your doctor.) To do Kegel exercises:  Squeeze your muscles as if you were trying not to pass gas. Or squeeze your  "muscles as if you were stopping the flow of urine. Your belly, legs, and buttocks shouldn't move.  Hold the squeeze for 3 seconds, then relax for 5 to 10 seconds.  Start with 3 seconds, then add 1 second each week until you are able to squeeze for 10 seconds.  Repeat the exercise 10 times a session. Do 3 to 8 sessions a day.  When should you call for help?  Watch closely for changes in your health, and be sure to contact your doctor if:    Your incontinence is getting worse.     You do not get better as expected.   Where can you learn more?  Go to https://www.[employer].net/patiented  Enter V684 in the search box to learn more about \"Bladder Training: Care Instructions.\"  Current as of: April 30, 2024  Content Version: 14.4    1349-3153 [employer].   Care instructions adapted under license by your healthcare professional. If you have questions about a medical condition or this instruction, always ask your healthcare professional. [employer] disclaims any warranty or liability for your use of this information.       "

## 2025-05-27 NOTE — PROGRESS NOTES
Preventive Care Visit  [facility]  May 27, 2025      Assessment & Plan     (Z00.00) Encounter for Medicare annual wellness exam  (primary encounter diagnosis)  Comment: up to date on screening.  She is declining vaccines.       (E78.2) Mixed hyperlipidemia (DYSLIPIDEMIA)  Comment: has been taking plant sterols. Per report, does not tolerate statin medication.     (E03.8) Other specified hypothyroidism  Comment: has been out of levothyroxine for some time. Needs refill and labs.   Plan: TSH with free T4 reflex, levothyroxine         (SYNTHROID/LEVOTHROID) 112 MCG tablet, blood          (E83.52) Hypercalcemia  Comment: has had persistent hypercalcemia for multiple years, with normal to elevated PTH. Likely   Plan: Ionized Calcium, Comprehensive metabolic panel         (BMP + Alb, Alk Phos, ALT, AST, Total. Bili,         TP), Parathyroid Hormone Intact, Vitamin D         Deficiency            (I10) Essential hypertension  Comment: she believes her blood pressure at home is low, yet readings in clinic are quite high. She reports that she had an ambulatory BP at [place] in 1995 that was over 300 systolic and that a 7 hour monitor was normal.  Needs 24 hour monitor.   Plan: 24 Hour Blood Pressure Monitor - Adult, Albumin        Random Urine Quantitative with Creat Ratio,         losartan (COZAAR) 25 MG tablet            (I25.10) ASCVD (arteriosclerotic cardiovascular disease)  Comment: has not needed nitroglycerin for many years  Plan: nitroGLYcerin (NITROSTAT) 0.4 MG sublingual         tablet            (J45.20) Mild intermittent asthma, unspecified whether complicated  Comment: has not needed albuterol for a long time.   Plan: albuterol (PROVENTIL) (2.5 MG/3ML) 0.083% neb         solution            (E11.9) Type 2 diabetes mellitus without complication, without long-term current use of insulin (H)  Comment: she manages her own mediations with goal blood sugar of  "150.  She has been out of medications, but we will get labs today.   Plan: Hemoglobin A1c, metFORMIN (GLUCOPHAGE XR) 500         MG 24 hr tablet, blood glucose monitoring         (SOFTCLIX) lancets, blood glucose (ACCU-CHEK         JINA PLUS) test strip            (Z13.9) Encounter for screening involving social determinants of health (SDoH)  Comment: she has multiple needs.  She is driving herself but likely is not safe to be driving longer distances. She needs help navigating arranging transportation for medical tests.  She also needs help with her home (cleaning up garbage, possibly arranging paperwork to sell the home).   Plan: Primary Care - Care Coordination Referral            (R03.0) Elevated blood-pressure reading, without diagnosis of hypertension  See comment above  Plan: 24 Hour Blood Pressure Monitor - Adult            (N39.46) Mixed incontinence  Comment: with skin breakdown. Strongly recommend pelvic PT, also may need help with home nursing for dressing changes if keeping up with zinc oxide is not possible for her.   Plan: Physical Therapy  Referral                The longitudinal plan of care for the diagnosis(es)/condition(s) as documented were addressed during this visit. Due to the added complexity in care, I will continue to support Margaret in the subsequent management and with ongoing continuity of care.       60 minutes spent by me on problems outside the scope of the annual wellness exam on the date of the encounter doing chart review, history and exam, documentation and further activities per the note    BMI  Estimated body mass index is 29.12 kg/m  as calculated from the following:    Height as of this encounter: 1.549 m (5' 1\").    Weight as of this encounter: 69.9 kg (154 lb 1.6 oz).       Counseling  Appropriate preventive services were addressed with this patient via screening, questionnaire, or discussion as appropriate for fall prevention, nutrition, physical activity, " "Tobacco-use cessation, social engagement, weight loss and cognition.  Checklist reviewing preventive services available has been given to the patient.  Reviewed patient's diet, addressing concerns and/or questions.   Patient is at risk for social isolation and has been provided with information about the benefit of social connection.   Discussed possible causes of fatigue. Updated plan of care.  Patient reported difficulty with activities of daily living were addressed today.The patient was provided with written information regarding signs of hearing loss.   Information on urinary incontinence and treatment options given to patient.           Marcela Robles is a 80 year old, presenting for the following:  Annual Visit        5/27/2025     1:53 PM   Additional Questions   Roomed by S   Accompanied by self          HPI  Margaret is here for a preventive health visit.  Overall, she is doing well with the following concerns:    Hasn't taken any medications yet today, hasn't eaten yet today. Feels shaky. Wants to go home and take a nap.  Blood pressure - she didn't start taking her losartan medication last year because she read \"17 pages\" of information about it and did not want to start it due to risk of falling.   She says she has white coat syndrome.  She maintains that she has run low at home and is afraid of having dangerously low blood pressure  She used to check BP at home but stopped. Likely cannot find her bp cuff at home.   She reports that she did a 7 hour BP monitor through [facility] years ago (1995)? [facility] records do not appear in Care Everywhere  Has strong family history of hypertension.  Speaking problem today - she was feeling shaky and a bit slow in thinking and processing when arriving to the visit - says she was cold and worried about the appointment, and she had not eaten anything yet today.   She had been taking care of her sister, who is now being taken care of by her son in law (for the past 4 " months).   She sleeps off-cycle. Goes to bed at 3am, sleeps during the day.   DM II   Metformin takes XR, takes 2 tabs per day  Wakes up usually with BG below 200. If it's 200 or higher, takes 1 metformin right away.   She aims for goal blood sugar of 150.   Takes medications as she feels she needs them.   Hypothyroidism - has been out of this medication for a while (months)  Hyperlipidemia - has had reactions to statin medication. Taking plant sterols.   Declines covid vaccine, pneumococcal, shingles, tetanus.  Social struggles:   Doesn't have water in her kitchen currently. Toilet is leaky  House is a mess  Fears she doesn't have the deed to her house  No room for people to walk in her house except herself  She feels she doesn't want other people in her house - she is on the defensive.   Doesn't have any family or friends locally. Has niece and nephew in [place].  She feels she needs a junk truck for the garage.   Hasn't paid her taxes since 2020  Skin sore - has mixed incontinence (stool and urine), has a complex combination of depends pants and multiple pads and pantiliners. One of the pantiliners irritates the skin in the gluteal fold where there is breakdown.              Advance Care Planning    Discussed advance care planning with patient; informed AVS has link to [facility].        5/27/2025   General Health   How would you rate your overall physical health? Good   Feel stress (tense, anxious, or unable to sleep) Only a little   (!) STRESS CONCERN      5/27/2025   Nutrition   Diet: Low fat/cholesterol    Diabetic    Carbohydrate counting    Breakfast skipped       Multiple values from one day are sorted in reverse-chronological order         5/27/2025   Exercise   Days per week of moderate/strenous exercise 0 days   (!) EXERCISE CONCERN      5/27/2025   Social Factors   Frequency of gathering with friends or relatives Never   Worry food won't last until get money to buy more No   Food not last or not  have enough money for food? No   Do you have housing? (Housing is defined as stable permanent housing and does not include staying outside in a car, in a tent, in an abandoned building, in an overnight shelter, or couch-surfing.) No   Are you worried about losing your housing? No   Lack of transportation? No   Unable to get utilities (heat,electricity)? No   Want help with housing or utility concern? No   (!) HOUSING CONCERN PRESENT(!) SOCIAL CONNECTIONS CONCERN      5/27/2025   Fall Risk   Fallen 2 or more times in the past year? Yes   Trouble with walking or balance? Yes   Reason Gait Speed Test Not Completed Unable to complete test, patient reported symptoms          5/27/2025   Activities of Daily Living- Home Safety   Needs help with the following daily activites Shopping    Housework   Safety concerns in the home None of the above       Multiple values from one day are sorted in reverse-chronological order         5/27/2025   Dental   Dentist two times every year? Yes         5/27/2025   Hearing Screening   Hearing concerns? (!) I FEEL THAT PEOPLE ARE MUMBLING OR NOT SPEAKING CLEARLY.    (!) I NEED TO ASK PEOPLE TO SPEAK UP OR REPEAT THEMSELVES.    (!) IT'S HARD TO FOLLOW A CONVERSATION IN A NOISY RESTAURANT OR CROWDED ROOM.       Multiple values from one day are sorted in reverse-chronological order         5/27/2025   Driving Risk Screening   Patient/family members have concerns about driving No         5/27/2025   General Alertness/Fatigue Screening   Have you been more tired than usual lately? (!) YES         5/27/2025   Urinary Incontinence Screening   Bothered by leaking urine in past 6 months Yes         Today's PHQ-2 Score:       5/27/2025     2:03 PM   PHQ-2 ( 1999 [employer])   Q1: Little interest or pleasure in doing things 0   Q2: Feeling down, depressed or hopeless 1   PHQ-2 Score 1           5/27/2025   Substance Use   Alcohol more than 3/day or more than 7/wk Not Applicable   Do you have a current  opioid prescription? No   How severe/bad is pain from 1 to 10? 0/10 (No Pain)   Do you use any other substances recreationally? No     Social History     Tobacco Use    Smoking status: Never     Passive exposure: Never    Smokeless tobacco: Never   Vaping Use    Vaping status: Never Used   Substance Use Topics    Alcohol use: No     Alcohol/week: 0.0 standard drinks of alcohol    Drug use: No                        Reviewed and updated as needed this visit by Provider   Tobacco  Allergies  Meds  Problems  Med Hx  Surg Hx  Fam Hx              Current providers sharing in care for this patient include:  Patient Care Team:  Zaina Ramirez MD as PCP - General (Internal Medicine)  Rosario Shannon MD as Hospitalist (Endocrinology, Diabetes, and Metabolism)  Zaina Ramirez MD as Assigned PCP    The following health maintenance items are reviewed in Epic and correct as of today:  Health Maintenance   Topic Date Due    ZOSTER VACCINE (1 of 2) Never done    PNEUMOCOCCAL VACCINE 50+ YEARS (2 of 2 - PCV) 12/27/2011    DIABETIC FOOT EXAM  09/13/2019    RSV VACCINE (1 - 1-dose 75+ series) Never done    DTAP/TDAP/TD VACCINE (2 - Td or Tdap) 12/18/2022    ANNUAL REVIEW OF HM ORDERS  06/30/2023    MICROALBUMIN  12/09/2023    A1C  07/10/2024    ASTHMA ACTION PLAN  07/19/2024    COVID-19 VACCINE (1 - 2024-25 season) Never done    LIPID  01/10/2025    BMP  01/31/2025    TSH W/FREE T4 REFLEX  01/31/2025    INFLUENZA VACCINE (Season Ended) 09/01/2025    ASTHMA CONTROL TEST  11/27/2025    EYE EXAM  04/30/2026    MEDICARE ANNUAL WELLNESS VISIT  05/27/2026    FALL RISK ASSESSMENT  05/27/2026    ADVANCE CARE PLANNING  06/30/2027    DEXA  09/18/2032    PHQ-2 (once per calendar year)  Completed    HPV VACCINE  Aged Out    MENINGITIS VACCINE  Aged Out    COLORECTAL CANCER SCREENING  Discontinued            Objective    Exam  BP (!) 189/84 (BP Location: Right arm, Patient Position: Sitting, Cuff Size: Adult Regular)   Pulse 79    "Temp 98.6  F (37  C) (Temporal)   Resp 20   Ht 1.549 m (5' 1\")   Wt 69.9 kg (154 lb 1.6 oz)   LMP  (LMP Unknown)   SpO2 100%   BMI 29.12 kg/m     Estimated body mass index is 29.12 kg/m  as calculated from the following:    Height as of this encounter: 1.549 m (5' 1\").    Weight as of this encounter: 69.9 kg (154 lb 1.6 oz).    Physical Exam  GENERAL: alert and no distress  EYES: Eyes grossly normal to inspection, PERRL and conjunctivae and sclerae normal  RESP: lungs clear to auscultation - no rales, rhonchi or wheezes  CV: regular rate and rhythm, normal S1 S2, no S3 or S4, no murmur  MS: no gross musculoskeletal defects noted, no edema  SKIN: no suspicious lesions or rashes and 1 cm area of skin breakdown on the left cheek in the gluteal fold  NEURO: Normal strength and tone, mentation intact and speech normal  PSYCH: mentation appears normal, slightly anxious  Gait and balance assessed per Gait Speed Test.  Result as above.       Signed Electronically by: Zaina Ramirez MD    "

## 2025-05-28 LAB
ALBUMIN SERPL BCG-MCNC: 4.5 G/DL (ref 3.5–5.2)
ALP SERPL-CCNC: 101 U/L (ref 40–150)
ALT SERPL W P-5'-P-CCNC: 32 U/L (ref 0–50)
ANION GAP SERPL CALCULATED.3IONS-SCNC: 13 MMOL/L (ref 7–15)
AST SERPL W P-5'-P-CCNC: 28 U/L (ref 0–45)
BILIRUB SERPL-MCNC: 0.4 MG/DL
BUN SERPL-MCNC: 28 MG/DL (ref 8–23)
CALCIUM SERPL-MCNC: 11.6 MG/DL (ref 8.8–10.4)
CHLORIDE SERPL-SCNC: 96 MMOL/L (ref 98–107)
CREAT SERPL-MCNC: 1.25 MG/DL (ref 0.51–0.95)
CREAT UR-MCNC: 25.3 MG/DL
EGFRCR SERPLBLD CKD-EPI 2021: 43 ML/MIN/1.73M2
GLUCOSE SERPL-MCNC: 225 MG/DL (ref 70–99)
HCO3 SERPL-SCNC: 25 MMOL/L (ref 22–29)
MICROALBUMIN UR-MCNC: 17.2 MG/L
MICROALBUMIN/CREAT UR: 67.98 MG/G CR (ref 0–25)
POTASSIUM SERPL-SCNC: 4.2 MMOL/L (ref 3.4–5.3)
PROT SERPL-MCNC: 7.6 G/DL (ref 6.4–8.3)
PTH-INTACT SERPL-MCNC: 66 PG/ML (ref 15–65)
SODIUM SERPL-SCNC: 134 MMOL/L (ref 135–145)
T4 FREE SERPL-MCNC: 0.79 NG/DL (ref 0.9–1.7)
TSH SERPL DL<=0.005 MIU/L-ACNC: 56.3 UIU/ML (ref 0.3–4.2)
VIT D+METAB SERPL-MCNC: 61 NG/ML (ref 20–50)

## 2025-05-29 ENCOUNTER — RESULTS FOLLOW-UP (OUTPATIENT)
Dept: PEDIATRICS | Facility: CLINIC | Age: 80
End: 2025-05-29

## 2025-05-29 ENCOUNTER — TELEPHONE (OUTPATIENT)
Dept: PEDIATRICS | Facility: CLINIC | Age: 80
End: 2025-05-29
Payer: COMMERCIAL

## 2025-05-29 DIAGNOSIS — E83.52 HYPERCALCEMIA: ICD-10-CM

## 2025-05-29 DIAGNOSIS — Z78.0 POST-MENOPAUSE: Primary | ICD-10-CM

## 2025-05-29 DIAGNOSIS — E21.0 PRIMARY HYPERPARATHYROIDISM: ICD-10-CM

## 2025-05-29 NOTE — TELEPHONE ENCOUNTER
RN called and spoke with patient, relayed provider message.     -Patient states she was out of her thyroid medication for the last two months, she will go to pharmacy to  medication tomorrow to restart.     -RN educated patient on hydration intake, ways to increase fluids.     - Patient is agreeable to rechecking DEXA scan.    - She will take her diabetes medications as prescribed.     - She takes 2,000 units of Vitmain D3- she will stop taking this supplement. Of note- she also takes multivitamin and will continue this.    - Patient would like to think about endocrinology referral. . She would like to know if there are providers besides Dr Méndez, as she has already seen her before. RN advised endocrinology would be able to provide this information, she would like to think about it.      Patient did not want to schedule at this time for lab work, dexa. Requests we call her back next week after 3pm to help schedule.   Postponing until mid-week.     When we call patient again  - schedule lab only visit for 8 weeks out for thyroid and kidney function test  - transfer to /help schedule DXA  - talk with patient about endocrinology referral and if she would like to pursue this    Grecia BADILLO RN on 5/29/2025 at 3:11 PM

## 2025-05-29 NOTE — Clinical Note
May 29, 2025      Margaret Ramos  [address] [place] [address]        Dear ,    We are writing to inform you of your test results.    {results letter list:[id number]}    No results found from the In Basket message.    If you have any questions or concerns, please call the clinic at the number listed above.       Sincerely,          Electronically signed

## 2025-05-29 NOTE — TELEPHONE ENCOUNTER
Patient would like her recent lab results form 5/27 mailed to her address.     Please print and mail, thanks!  Grecia Mares RN

## 2025-06-04 LAB — 1,25(OH)2D SERPL-MCNC: 42.4 PG/ML (ref 19.9–79.3)

## 2025-06-04 NOTE — TELEPHONE ENCOUNTER
Called patient at [phone] to do the following:  - schedule lab only visit for 8 weeks out for thyroid and kidney function test  - transfer to /help schedule DXA  - talk with patient about endocrinology referral and if she would like to pursue this    Scheduled for lab only 7/22/25 for thyroid lab draw. NEEDS LAB ORDERED    States regarding endocrinology, she has been holding off on doing anything as the air quality has been bad. Informed patient this process takes a long time she we should make a referral now and she may get in with endo in 4-6 weeks.   States she wants to call her insurance to inquire about who is covered. Requested patient call insurance soon and then call clinic to inform of decision.     Patient agreeable to schedule DEXA now. Warm transferred to  to help with DEXA scheduling.     Instructed patient to call [phone] for new or worsening symptoms or further questions. Patient verbalized understanding and agrees with the plan.     Ally Chen RN

## 2025-06-19 ENCOUNTER — TELEPHONE (OUTPATIENT)
Dept: PEDIATRICS | Facility: CLINIC | Age: 80
End: 2025-06-19
Payer: COMMERCIAL

## 2025-06-19 NOTE — TELEPHONE ENCOUNTER
Patient Quality Outreach    Patient is due for the following:   Diabetes -  A1C, LDL (Fasting), Eye Exam, Microalbumin, Diabetic Follow-Up Visit, and Foot Exam  Asthma  -  ACT needed and Asthma follow-up visit  Dexa Scan     Action(s) Taken:   Patient has upcoming appointment, these items will be addressed at that time.    Type of outreach:    Chart review performed, no outreach needed.    Questions for provider review:    None         Dotty Lancaster CMA  Chart routed to None.

## 2025-07-02 ENCOUNTER — DOCUMENTATION ONLY (OUTPATIENT)
Dept: LAB | Facility: CLINIC | Age: 80
End: 2025-07-02
Payer: COMMERCIAL

## 2025-07-02 DIAGNOSIS — E03.9 HYPOTHYROIDISM, UNSPECIFIED TYPE: Primary | ICD-10-CM

## 2025-07-02 NOTE — PROGRESS NOTES
Margaret Ramos has an upcoming lab appointment:    Future Appointments   Date Time Provider Department Center   7/22/2025  4:00 PM EA LAB EALABR [facility]   7/22/2025  4:30 PM EADX1 EADEXA EA   9/4/2025  2:30 PM Zaina Ramirez MD [facility]     Patient is requesting lab orders for TSH to be placed    There is no order available. Please review and place either future orders or HMPO (Review of Health Maintenance Protocol Orders), as appropriate.    Health Maintenance Due   Topic    ANNUAL REVIEW OF HM ORDERS     LIPID      Ally BRAMBILA

## 2025-07-22 ENCOUNTER — LAB (OUTPATIENT)
Dept: LAB | Facility: CLINIC | Age: 80
End: 2025-07-22
Payer: COMMERCIAL

## 2025-07-22 ENCOUNTER — ANCILLARY PROCEDURE (OUTPATIENT)
Dept: BONE DENSITY | Facility: CLINIC | Age: 80
End: 2025-07-22
Attending: INTERNAL MEDICINE
Payer: COMMERCIAL

## 2025-07-22 DIAGNOSIS — Z78.0 POST-MENOPAUSE: ICD-10-CM

## 2025-07-22 DIAGNOSIS — E03.9 HYPOTHYROIDISM, UNSPECIFIED TYPE: ICD-10-CM

## 2025-07-22 PROCEDURE — 77080 DXA BONE DENSITY AXIAL: CPT | Mod: TC | Performed by: PHYSICIAN ASSISTANT

## 2025-07-22 PROCEDURE — 84439 ASSAY OF FREE THYROXINE: CPT

## 2025-07-22 PROCEDURE — 36415 COLL VENOUS BLD VENIPUNCTURE: CPT

## 2025-07-22 PROCEDURE — 84443 ASSAY THYROID STIM HORMONE: CPT

## 2025-07-23 LAB
T4 FREE SERPL-MCNC: 1.76 NG/DL (ref 0.9–1.7)
TSH SERPL DL<=0.005 MIU/L-ACNC: 0.29 UIU/ML (ref 0.3–4.2)

## 2025-07-28 ENCOUNTER — DOCUMENTATION ONLY (OUTPATIENT)
Dept: PEDIATRICS | Facility: CLINIC | Age: 80
End: 2025-07-28
Payer: COMMERCIAL

## 2025-07-28 NOTE — PROGRESS NOTES
Call patient.they were not able to add on labs.   Will obtain in Sept at next appointment.   Ashu Cabrera PA-C

## 2025-07-28 NOTE — PROGRESS NOTES
The add-on test BASIC METABOLIC PANEL  that was requested on 7/28/25 is unable to be completed due to sample being discarded. Future order is available for collection. If labs are needed before next appointment, please arrange for collection.

## 2025-07-28 NOTE — PROGRESS NOTES
Duplicate - see the other encounter.    Junie BARBOUR  Clinic RN  [facility]

## 2025-07-29 ENCOUNTER — TELEPHONE (OUTPATIENT)
Dept: PEDIATRICS | Facility: CLINIC | Age: 80
End: 2025-07-29
Payer: COMMERCIAL

## 2025-07-29 NOTE — TELEPHONE ENCOUNTER
DXA scan and report mailed to patient.  LOVELY sent to abstraction.    Kimberly Schwartz RT (R)(M)  [facility]  [phone]

## 2025-09-04 ENCOUNTER — OFFICE VISIT (OUTPATIENT)
Dept: PEDIATRICS | Facility: CLINIC | Age: 80
End: 2025-09-04
Payer: COMMERCIAL

## 2025-09-04 VITALS
OXYGEN SATURATION: 100 % | TEMPERATURE: 97.8 F | DIASTOLIC BLOOD PRESSURE: 102 MMHG | SYSTOLIC BLOOD PRESSURE: 203 MMHG | RESPIRATION RATE: 20 BRPM | HEART RATE: 80 BPM | HEIGHT: 61 IN | BODY MASS INDEX: 29.3 KG/M2 | WEIGHT: 155.2 LBS

## 2025-09-04 DIAGNOSIS — E78.2 MIXED HYPERLIPIDEMIA: ICD-10-CM

## 2025-09-04 DIAGNOSIS — L30.9 DERMATITIS: ICD-10-CM

## 2025-09-04 DIAGNOSIS — I10 ESSENTIAL HYPERTENSION: Primary | ICD-10-CM

## 2025-09-04 DIAGNOSIS — E11.9 TYPE 2 DIABETES MELLITUS WITHOUT COMPLICATION, WITHOUT LONG-TERM CURRENT USE OF INSULIN (H): ICD-10-CM

## 2025-09-04 DIAGNOSIS — E03.8 OTHER SPECIFIED HYPOTHYROIDISM: ICD-10-CM

## 2025-09-04 RX ORDER — TRIAMCINOLONE ACETONIDE 0.25 MG/G
CREAM TOPICAL 2 TIMES DAILY
Qty: 30 G | Refills: 1 | Status: SHIPPED | OUTPATIENT
Start: 2025-09-04

## 2025-09-04 RX ORDER — LEVOTHYROXINE SODIUM 100 UG/1
100 TABLET ORAL
Qty: 30 TABLET | Refills: 1 | Status: SHIPPED | OUTPATIENT
Start: 2025-09-04